# Patient Record
Sex: FEMALE | Race: WHITE | NOT HISPANIC OR LATINO | Employment: FULL TIME | URBAN - METROPOLITAN AREA
[De-identification: names, ages, dates, MRNs, and addresses within clinical notes are randomized per-mention and may not be internally consistent; named-entity substitution may affect disease eponyms.]

---

## 2023-03-29 ENCOUNTER — HOSPITAL ENCOUNTER (OUTPATIENT)
Dept: DATA CONVERSION | Facility: HOSPITAL | Age: 58
End: 2023-03-29
Attending: INTERNAL MEDICINE | Admitting: INTERNAL MEDICINE
Payer: COMMERCIAL

## 2023-03-29 DIAGNOSIS — Z86.010 PERSONAL HISTORY OF COLONIC POLYPS: ICD-10-CM

## 2023-03-29 DIAGNOSIS — F41.9 ANXIETY DISORDER, UNSPECIFIED: ICD-10-CM

## 2023-03-29 DIAGNOSIS — Z87.891 PERSONAL HISTORY OF NICOTINE DEPENDENCE: ICD-10-CM

## 2023-03-29 DIAGNOSIS — F32.A DEPRESSION, UNSPECIFIED: ICD-10-CM

## 2023-03-29 DIAGNOSIS — K63.5 POLYP OF COLON: ICD-10-CM

## 2023-03-29 DIAGNOSIS — I25.10 ATHEROSCLEROTIC HEART DISEASE OF NATIVE CORONARY ARTERY WITHOUT ANGINA PECTORIS: ICD-10-CM

## 2023-03-29 DIAGNOSIS — K21.9 GASTRO-ESOPHAGEAL REFLUX DISEASE WITHOUT ESOPHAGITIS: ICD-10-CM

## 2023-03-29 DIAGNOSIS — Z80.0 FAMILY HISTORY OF MALIGNANT NEOPLASM OF DIGESTIVE ORGANS: ICD-10-CM

## 2023-03-29 DIAGNOSIS — Z12.11 ENCOUNTER FOR SCREENING FOR MALIGNANT NEOPLASM OF COLON: ICD-10-CM

## 2023-03-29 DIAGNOSIS — I10 ESSENTIAL (PRIMARY) HYPERTENSION: ICD-10-CM

## 2023-03-29 DIAGNOSIS — K64.0 FIRST DEGREE HEMORRHOIDS: ICD-10-CM

## 2023-03-29 DIAGNOSIS — J43.9 EMPHYSEMA, UNSPECIFIED (MULTI): ICD-10-CM

## 2023-04-05 LAB
COMPLETE PATHOLOGY REPORT: NORMAL
CONVERTED CLINICAL DIAGNOSIS-HISTORY: NORMAL
CONVERTED FINAL DIAGNOSIS: NORMAL
CONVERTED FINAL REPORT PDF LINK TO COPY AND PASTE: NORMAL
CONVERTED GROSS DESCRIPTION: NORMAL

## 2023-05-24 ENCOUNTER — HOSPITAL ENCOUNTER (OUTPATIENT)
Dept: DATA CONVERSION | Facility: HOSPITAL | Age: 58
End: 2023-05-24
Attending: HOSPITALIST | Admitting: HOSPITALIST
Payer: COMMERCIAL

## 2023-05-24 DIAGNOSIS — I71.20 THORACIC AORTIC ANEURYSM, WITHOUT RUPTURE, UNSPECIFIED (CMS-HCC): ICD-10-CM

## 2023-05-24 DIAGNOSIS — Z87.891 PERSONAL HISTORY OF NICOTINE DEPENDENCE: ICD-10-CM

## 2023-05-24 LAB
ANION GAP IN SER/PLAS: 9 MMOL/L (ref 10–20)
CALCIUM (MG/DL) IN SER/PLAS: 8.8 MG/DL (ref 8.6–10.3)
CARBON DIOXIDE, TOTAL (MMOL/L) IN SER/PLAS: 31 MMOL/L (ref 21–32)
CHLORIDE (MMOL/L) IN SER/PLAS: 104 MMOL/L (ref 98–107)
CREATININE (MG/DL) IN SER/PLAS: 0.54 MG/DL (ref 0.5–1.05)
ERYTHROCYTE DISTRIBUTION WIDTH (RATIO) BY AUTOMATED COUNT: 13.2 % (ref 11.5–14.5)
ERYTHROCYTE MEAN CORPUSCULAR HEMOGLOBIN CONCENTRATION (G/DL) BY AUTOMATED: 32.1 G/DL (ref 32–36)
ERYTHROCYTE MEAN CORPUSCULAR VOLUME (FL) BY AUTOMATED COUNT: 92 FL (ref 80–100)
ERYTHROCYTES (10*6/UL) IN BLOOD BY AUTOMATED COUNT: 4.5 X10E12/L (ref 4–5.2)
GFR FEMALE: >90 ML/MIN/1.73M2
GLUCOSE (MG/DL) IN SER/PLAS: 88 MG/DL (ref 74–99)
HEMATOCRIT (%) IN BLOOD BY AUTOMATED COUNT: 41.4 % (ref 36–46)
HEMOGLOBIN (G/DL) IN BLOOD: 13.3 G/DL (ref 12–16)
LEUKOCYTES (10*3/UL) IN BLOOD BY AUTOMATED COUNT: 6.3 X10E9/L (ref 4.4–11.3)
PLATELETS (10*3/UL) IN BLOOD AUTOMATED COUNT: 247 X10E9/L (ref 150–450)
POTASSIUM (MMOL/L) IN SER/PLAS: 3.9 MMOL/L (ref 3.5–5.3)
SODIUM (MMOL/L) IN SER/PLAS: 140 MMOL/L (ref 136–145)
UREA NITROGEN (MG/DL) IN SER/PLAS: 14 MG/DL (ref 6–23)

## 2023-07-20 LAB
ALANINE AMINOTRANSFERASE (SGPT) (U/L) IN SER/PLAS: 23 U/L (ref 7–45)
ALBUMIN (G/DL) IN SER/PLAS: 3.5 G/DL (ref 3.4–5)
ALKALINE PHOSPHATASE (U/L) IN SER/PLAS: 102 U/L (ref 33–110)
ANION GAP IN SER/PLAS: 13 MMOL/L (ref 10–20)
ASPARTATE AMINOTRANSFERASE (SGOT) (U/L) IN SER/PLAS: 13 U/L (ref 9–39)
BASOPHILS (10*3/UL) IN BLOOD BY AUTOMATED COUNT: 0.05 X10E9/L (ref 0–0.1)
BASOPHILS/100 LEUKOCYTES IN BLOOD BY AUTOMATED COUNT: 0.3 % (ref 0–2)
BILIRUBIN TOTAL (MG/DL) IN SER/PLAS: 0.5 MG/DL (ref 0–1.2)
CALCIUM (MG/DL) IN SER/PLAS: 8.7 MG/DL (ref 8.6–10.3)
CARBON DIOXIDE, TOTAL (MMOL/L) IN SER/PLAS: 27 MMOL/L (ref 21–32)
CHLORIDE (MMOL/L) IN SER/PLAS: 105 MMOL/L (ref 98–107)
CREATININE (MG/DL) IN SER/PLAS: 0.44 MG/DL (ref 0.5–1.05)
EOSINOPHILS (10*3/UL) IN BLOOD BY AUTOMATED COUNT: 0.19 X10E9/L (ref 0–0.7)
EOSINOPHILS/100 LEUKOCYTES IN BLOOD BY AUTOMATED COUNT: 1.3 % (ref 0–6)
ERYTHROCYTE DISTRIBUTION WIDTH (RATIO) BY AUTOMATED COUNT: 13.7 % (ref 11.5–14.5)
ERYTHROCYTE DISTRIBUTION WIDTH (RATIO) BY AUTOMATED COUNT: 13.7 % (ref 11.5–14.5)
ERYTHROCYTE MEAN CORPUSCULAR HEMOGLOBIN CONCENTRATION (G/DL) BY AUTOMATED: 31.2 G/DL (ref 32–36)
ERYTHROCYTE MEAN CORPUSCULAR HEMOGLOBIN CONCENTRATION (G/DL) BY AUTOMATED: 31.2 G/DL (ref 32–36)
ERYTHROCYTE MEAN CORPUSCULAR VOLUME (FL) BY AUTOMATED COUNT: 92 FL (ref 80–100)
ERYTHROCYTE MEAN CORPUSCULAR VOLUME (FL) BY AUTOMATED COUNT: 92 FL (ref 80–100)
ERYTHROCYTES (10*6/UL) IN BLOOD BY AUTOMATED COUNT: 3.85 X10E12/L (ref 4–5.2)
ERYTHROCYTES (10*6/UL) IN BLOOD BY AUTOMATED COUNT: 3.85 X10E12/L (ref 4–5.2)
GFR FEMALE: >90 ML/MIN/1.73M2
GLUCOSE (MG/DL) IN SER/PLAS: 132 MG/DL (ref 74–99)
HEMATOCRIT (%) IN BLOOD BY AUTOMATED COUNT: 35.3 % (ref 36–46)
HEMATOCRIT (%) IN BLOOD BY AUTOMATED COUNT: 35.3 % (ref 36–46)
HEMOGLOBIN (G/DL) IN BLOOD: 11 G/DL (ref 12–16)
HEMOGLOBIN (G/DL) IN BLOOD: 11 G/DL (ref 12–16)
IMMATURE GRANULOCYTES/100 LEUKOCYTES IN BLOOD BY AUTOMATED COUNT: 0.7 % (ref 0–0.9)
LEUKOCYTES (10*3/UL) IN BLOOD BY AUTOMATED COUNT: 14.7 X10E9/L (ref 4.4–11.3)
LEUKOCYTES (10*3/UL) IN BLOOD BY AUTOMATED COUNT: 14.7 X10E9/L (ref 4.4–11.3)
LYMPHOCYTES (10*3/UL) IN BLOOD BY AUTOMATED COUNT: 1.28 X10E9/L (ref 1.2–4.8)
LYMPHOCYTES/100 LEUKOCYTES IN BLOOD BY AUTOMATED COUNT: 8.8 % (ref 13–44)
MAGNESIUM (MG/DL) IN SER/PLAS: 1.95 MG/DL (ref 1.6–2.4)
MONOCYTES (10*3/UL) IN BLOOD BY AUTOMATED COUNT: 1.03 X10E9/L (ref 0.1–1)
MONOCYTES/100 LEUKOCYTES IN BLOOD BY AUTOMATED COUNT: 7 % (ref 2–10)
NEUTROPHILS (10*3/UL) IN BLOOD BY AUTOMATED COUNT: 11.97 X10E9/L (ref 1.2–7.7)
NEUTROPHILS/100 LEUKOCYTES IN BLOOD BY AUTOMATED COUNT: 81.9 % (ref 40–80)
PLATELETS (10*3/UL) IN BLOOD AUTOMATED COUNT: 556 X10E9/L (ref 150–450)
PLATELETS (10*3/UL) IN BLOOD AUTOMATED COUNT: 556 X10E9/L (ref 150–450)
POTASSIUM (MMOL/L) IN SER/PLAS: 4.1 MMOL/L (ref 3.5–5.3)
PROTEIN TOTAL: 6.1 G/DL (ref 6.4–8.2)
SODIUM (MMOL/L) IN SER/PLAS: 141 MMOL/L (ref 136–145)
UREA NITROGEN (MG/DL) IN SER/PLAS: 19 MG/DL (ref 6–23)

## 2023-09-07 VITALS — WEIGHT: 170.86 LBS | HEIGHT: 65 IN | BODY MASS INDEX: 28.47 KG/M2

## 2023-09-07 VITALS — BODY MASS INDEX: 30.01 KG/M2 | WEIGHT: 180.34 LBS

## 2023-09-30 NOTE — H&P
History & Physical Reviewed:   Pregnant/Lactating:  ·  Are You Pregnant no   ·  Are You Currently Breastfeeding no     I have reviewed the History and Physical dated:  03-May-2023   History and Physical reviewed and relevant findings noted. Patient examined to review pertinent physical  findings.: No significant changes   Home Medications Reviewed: no changes noted   Allergies Reviewed: no changes noted       Airway/Sedation Assessment:  ·  Emotional Status calm   ·  Neurologic alert & oriented x 3   ·  Respiratory clear to auscultation   ·  Cardiovascular rhythm & rate regular   ·  GI/ soft, nontender     · Pulses present: Pedal Left, Pedal Right, Radial Left, Radial Right     ·  Mouth Opening OK yes   ·  Neck Flexibility OK yes   ·  Loose Teeth no   ·  Oropharyngeal Classification Class I   ·  Sedation Plan light sedation       ERAS (Enhanced Recovery After Surgery):  ·  ERAS Patient: no     Consent:   COVID-19 Consent:  ·  COVID-19 Risk Consent Surgeon has reviewed key risks related to the risk of micha COVID-19 and if they contract COVID-19 what the risks are.       Electronic Signatures:  Primo Delgado (APRN-CNP)  (Signed 24-May-2023 09:35)   Authored: History & Physical Reviewed, Airway/Sedation,  ERAS, Consent, Note Completion      Last Updated: 24-May-2023 09:35 by Primo Delgado (APRN-CNP)

## 2023-10-24 PROBLEM — E66.811 CLASS 1 OBESITY DUE TO EXCESS CALORIES WITHOUT SERIOUS COMORBIDITY WITH BODY MASS INDEX (BMI) OF 30.0 TO 30.9 IN ADULT: Status: ACTIVE | Noted: 2023-10-24

## 2023-10-24 PROBLEM — E78.5 DYSLIPIDEMIA: Status: ACTIVE | Noted: 2023-10-24

## 2023-10-24 PROBLEM — Z98.890 STATUS POST THORACIC AORTIC ANEURYSM REPAIR: Status: ACTIVE | Noted: 2023-10-24

## 2023-10-24 PROBLEM — I11.9 BENIGN HYPERTENSIVE HEART DISEASE WITHOUT CONGESTIVE HEART FAILURE: Status: ACTIVE | Noted: 2023-10-24

## 2023-10-24 PROBLEM — Z87.891 HISTORY OF TOBACCO USE: Status: ACTIVE | Noted: 2023-10-24

## 2023-10-24 PROBLEM — Z86.79 STATUS POST THORACIC AORTIC ANEURYSM REPAIR: Status: ACTIVE | Noted: 2023-10-24

## 2023-10-24 PROBLEM — E66.09 CLASS 1 OBESITY DUE TO EXCESS CALORIES WITHOUT SERIOUS COMORBIDITY WITH BODY MASS INDEX (BMI) OF 30.0 TO 30.9 IN ADULT: Status: ACTIVE | Noted: 2023-10-24

## 2023-10-30 ENCOUNTER — APPOINTMENT (OUTPATIENT)
Dept: CARDIOLOGY | Facility: CLINIC | Age: 58
End: 2023-10-30
Payer: COMMERCIAL

## 2023-11-20 PROBLEM — R05.3 CHRONIC COUGH: Status: ACTIVE | Noted: 2023-11-20

## 2023-11-20 PROBLEM — R07.9 CHEST PAIN, UNSPECIFIED: Status: ACTIVE | Noted: 2023-11-20

## 2023-11-20 NOTE — PROGRESS NOTES
[facility]    Patient Name: Cassidy Arnold  Patient : 1965      Scribe Attestation  By signing my name below, I EvaJanet Castillo   attest that this documentation has been prepared under the direction and in the presence of Marty Redd DO.       Reason for visit:  This is a 58-year-old female here for follow-up regarding thoracic ascending aortic aneurysm, complaint of chest pain, chronic cough, dyslipidemia, and tobacco use.      HPI:  This is a 58-year-old female here for follow-up regarding thoracic ascending aortic aneurysm, complaint of chest pain, chronic cough, dyslipidemia, and tobacco use.  The patient was last evaluated by me in 2023.  At that visit I had referred her to the aortic clinic, ordered a lipid panel, recommended stress testing which she declined.  Patient subsequently underwent replacement of the ascending aorta with hemiarch using a 30 cm Gelweave vascular graft in 2023.  Patient was seen by the cardiology NP several weeks later at which time lisinopril was reduced to 2.5 mg daily blood work was ordered patient was referred to cardiac rehab, and she was asked to follow-up again in 3 months.  Patient was last seen by CT surgery in 2023 where patient was reported to be doing well and she was asked to be seen again in 1 year with a new CT scan.  CMP done in 2023 showed normal serum sodium and potassium with a serum creatinine of 0.44, normal ALT/AST, serum magnesium was 1.99, BNP was 255, troponin was 3, CBC showed a hemoglobin of 11.0.  Cardiac catheterization done in May 2023 showed angiographically unremarkable coronary arteries.  Intraoperative TANIA done 2023 showed normal left ventricular systolic function, normal right ventricular systolic function, no significant valve abnormalities. ECG done today showed sinus rhythm with a heart rate of 70 bpm. The patient reports occasional dizziness when quickly  turning positions and lasts for a few seconds. She states that she notices dizziness also from when turning her head from side to side. She denies any new chest pain, shortness of breath, and palpitations. She states that she is currently taking metoprolol 25 mg twice daily. She states that she takes all of her medications as prescribed. During my exam, she was resting comfortably on the exam table.        Assessment/Plan:   1. Thoracic ascending aortic aneurysm  The patient has a history of thoracic ascending aortic aneurysm and underwent replacement of the ascending aorta with hemiarch using a 30 cm Gelweave vascular graft in July 2023.   Intraoperative TANIA done 7/7/2023 showed normal left ventricular systolic function, normal right ventricular systolic function, no significant valve abnormalities.   She should continue to follow with CT surgery.    2. Chest pain  The previously reported chest pain had improved since the last visit.  ECG done today showed sinus rhythm with a heart rate of 70 bpm.    Blood pressure appears controlled on exam today.  Cardiac catheterization done in May 2023 showed angiographically unremarkable coronary arteries.    Lipid panel done 2/4/2022 showed an LDL cholesterol of 83 and triglycerides of 58 while not on any lipid-lowering medications.  She is currently on pravastatin 20 mg daily.   Lab works as noted below will be done in 6 months prior to his next visit.  Please see lifestyle recommendations below.   Follow up in 6 months and sooner if necessary.      3. Dyslipidemia  Lipid panel done 2/4/2022 showed an LDL cholesterol of 83 and triglycerides of 58 while not on any lipid-lowering medications.  She is currently on pravastatin 20 mg daily.   I will update lipid panel in 6 months.  Please see lifestyle recommendations below.      4. Tobacco use  Patient reports that she has quit smoking.  I discussed with her the importance of continued smoking cessation.     5. Obesity  Please  see lifestyle recommendations below.     6. Dizziness  The patient reports dizziness when quickly changing positions.  Blood pressure appears controlled on  exam today.  Echocardiogram as noted above.  Recent lab works as noted in the HPI.  Lab works as noted below will be done in 6 months prior to his next visit.  Patient should follow general recommendations including staying well-hydrated, changing the positions slowly, wearing compression stockings as necessary, and routine exercise.     Orders:   CMP/lipid/magnesium/CBC in 6 months,   Follow-up in 6 months.         Lifestyle Recommendations  I recommend a whole-food plant-based diet, an eating pattern that encourages the consumption of unrefined plant foods (such as fruits, vegetables, tubers, whole grains, legumes, nuts and seeds) and discourages meats, dairy products, eggs and processed foods.     The AHA/ACC recommends that the patient consume a dietary pattern that emphasizes intake of vegetables, fruits, and whole grains; includes low-fat dairy products, poultry, fish, legumes, non-tropical vegetable oils, and nuts; and limits intake of sodium, sweets, sugar-sweetened beverages, and red meats.  Adapt this dietary pattern to appropriate calorie requirements (a 500-750 kcal/day deficit to loose weight), personal and cultural food preferences, and nutrition therapy for other medical conditions (including diabetes).  Achieve this pattern by following plans such as the [place] Mediterranean, DASH dietary pattern, or AHA diet.     Engage in 2 hours and 30 minutes per week of moderate-intensity physical activity, or 1 hour and 15 minutes (75 minutes) per week of vigorous-intensity aerobic physical activity, or an equivalent combination of moderate and vigorous-intensity aerobic physical activity. Aerobic activity should be performed in episodes of at least 10 minutes preferably spread throughout the week.     Adhering to a heart healthy diet, regular exercise  habits, avoidance of tobacco products, and maintenance of a healthy weight are crucial components of their heart disease risk reduction.     Any positive review of systems not specifically addressed in the office visit today should be evaluated and treated by the patients primary care physician or in an emergency department if necessary     Patient was notified that results from ordered tests will be called to the patient if it changes current management; it will otherwise be discussed at a future appointment and available on [facility].     Thank you for allowing me to participate in the care of this patient.        This document was generated using the assistance of voice recognition software. If there are any errors of spelling, grammar, syntax, or meaning; please feel free to contact me directly for clarification.    Past Medical History:  She has no past medical history on file.    Past Surgical History:  She has no past surgical history on file.      Social History:  She has no history on file for tobacco use, alcohol use, and drug use.    Family History:  No family history on file.     Allergies:  Patient has no allergy information on record.    Outpatient Medications:  Current Outpatient Medications   Medication Instructions    lidocaine 4 % patch APPLY 1 PATCH TO THE AFFECTED AREA AND LEAVE IN PLACE FOR 12 HOURS, THEN REMOVE AND LEAVE OFF FOR 12 HOURS.    metoprolol tartrate (Lopressor) 50 mg tablet TAKE 1 TABLET BY MOUTH TWO TIMES A DAY    traMADol (Ultram) 50 mg tablet TAKE 1 TABLET BY MOUTH EVERY 8 HOURS FOR 3 DAYS. AS NEEDED FOR SEVERE POST-OPERATIVE PAIN        ROS:  A 14 point review of systems was done and is negative other than as stated in HPI    Vitals:      6/2/2022    10:45 AM 12/6/2022     2:31 PM 3/29/2023     9:35 AM 4/19/2023     2:11 PM 5/3/2023     2:30 PM 5/24/2023     9:30 AM 7/20/2023     9:10 AM   Vitals   Systolic 123 124  120 137  110   Diastolic 77 77  86 75  70   Heart Rate 80 85   "81 83  111   Temp 36.2 °C (97.2 °F) 37.7 °C (99.8 °F)        Resp 16 16        Height (in)   1.651 m (5' 5\") 1.651 m (5' 5\") 1.651 m (5' 5\")  1.651 m (5' 5\")   Weight (lb) 173.1 177.7 170.86 173 180 180.34 183   BMI 28.81 kg/m2 29.57 kg/m2 28.43 kg/m2 28.79 kg/m2 29.95 kg/m2 30.01 kg/m2 30.45 kg/m2   BSA (m2) 1.9 m2 1.92 m2 1.89 m2 1.9 m2 1.93 m2 1.94 m2 1.95 m2        Physical Exam:   Constitutional: Cooperative, in no acute distress, alert, appears stated age.  Skin: Skin color, texture, turgor normal. No rashes or lesions.  Head: Normocephalic. No masses, lesions, tenderness or abnormalities  Eyes: Extraocular movements are grossly intact.  Mouth and throat: Mucous membranes moist  Neck: Neck supple, no carotid bruits, no JVD  Respiratory: Lungs clear to auscultation, no wheezing or rhonchi, no use of accessory muscles  Chest wall: Surgical scar, normal excursion with respiration  Cardiovascular: Regular rhythm without murmur, gallop, or rubs  Gastrointestinal: Abdomen soft, nontender. Bowel sounds normal. Obese.  Musculoskeletal: Strength equal in upper extremities  Extremities: No pitting edema  Neurologic: Sensation grossly intact, alert and oriented x3    Intake/Output:   No intake/output data recorded.    Outpatient Medications  Current Outpatient Medications on File Prior to Visit   Medication Sig Dispense Refill    lidocaine 4 % patch APPLY 1 PATCH TO THE AFFECTED AREA AND LEAVE IN PLACE FOR 12 HOURS, THEN REMOVE AND LEAVE OFF FOR 12 HOURS. 7 patch 0    metoprolol tartrate (Lopressor) 50 mg tablet TAKE 1 TABLET BY MOUTH TWO TIMES A DAY 60 tablet 0    traMADol (Ultram) 50 mg tablet TAKE 1 TABLET BY MOUTH EVERY 8 HOURS FOR 3 DAYS. AS NEEDED FOR SEVERE POST-OPERATIVE PAIN 9 tablet 0     No current facility-administered medications on file prior to visit.       Labs: (past 26 weeks)  Recent Results (from the past 4368 hour(s))   Basic Metabolic Panel    Collection Time: 05/24/23  9:10 AM   Result Value Ref " Range    Glucose 88 74 - 99 mg/dL    Sodium 140 136 - 145 mmol/L    Potassium 3.9 3.5 - 5.3 mmol/L    Chloride 104 98 - 107 mmol/L    Bicarbonate 31 21 - 32 mmol/L    Anion Gap 9 (L) 10 - 20 mmol/L    Urea Nitrogen 14 6 - 23 mg/dL    Creatinine 0.54 0.50 - 1.05 mg/dL    GFR Female >90 >90 mL/min/1.73m2    Calcium 8.8 8.6 - 10.3 mg/dL   CBC    Collection Time: 05/24/23  9:10 AM   Result Value Ref Range    WBC 6.3 4.4 - 11.3 x10E9/L    RBC 4.50 4.00 - 5.20 x10E12/L    Hemoglobin 13.3 12.0 - 16.0 g/dL    Hematocrit 41.4 36.0 - 46.0 %    MCV 92 80 - 100 fL    MCHC 32.1 32.0 - 36.0 g/dL    Platelets 247 150 - 450 x10E9/L    RDW 13.2 11.5 - 14.5 %   Staphylococcus/MRSA Colonization, Culture    Collection Time: 07/03/23  2:40 PM    Specimen: Respiratory    Nasal   Result Value Ref Range    Staph/MRSA Screen Culture Staphylococcus aureus (A)    Urinalysis with Reflex Microscopic    Collection Time: 07/03/23  2:40 PM   Result Value Ref Range    Color, Urine YELLOW STRAW,YELLOW    Appearance, Urine CLEAR CLEAR    Specific Gravity, Urine 1.025 1.005 - 1.035    pH, Urine 6.0 5.0 - 8.0    Protein, Urine NEGATIVE NEGATIVE mg/dL    Glucose, Urine NEGATIVE NEGATIVE mg/dL    Blood, Urine MODERATE (2+) (A) NEGATIVE    Ketones, Urine NEGATIVE NEGATIVE mg/dL    Bilirubin, Urine NEGATIVE NEGATIVE    Urobilinogen, Urine <2.0 0.0 - 1.9 mg/dL    Nitrite, Urine NEGATIVE NEGATIVE    Leukocyte Esterase, Urine NEGATIVE NEGATIVE   Urine Culture    Collection Time: 07/03/23  2:40 PM    Specimen: Urine   Result Value Ref Range    Urine Culture NO SIGNIFICANT GROWTH.    Type And Screen    Collection Time: 07/03/23  2:40 PM   Result Value Ref Range    ABO GROUP (TYPE) IN BLOOD O     Rh POS     ANTIBODY SCREEN NEG    Coagulation Screen    Collection Time: 07/03/23  2:40 PM   Result Value Ref Range    Protime 10.4 9.8 - 12.8 sec    INR 0.9 0.9 - 1.1    aPTT 29 27 - 38 sec   Basic Metabolic Panel    Collection Time: 07/03/23  2:40 PM   Result Value  Ref Range    Glucose 76 74 - 99 mg/dL    Sodium 142 136 - 145 mmol/L    Potassium 4.0 3.5 - 5.3 mmol/L    Chloride 105 98 - 107 mmol/L    Bicarbonate 27 21 - 32 mmol/L    Anion Gap 14 10 - 20 mmol/L    Urea Nitrogen 17 6 - 23 mg/dL    Creatinine 0.53 0.50 - 1.05 mg/dL    GFR Female >90 >90 mL/min/1.73m2    Calcium 9.0 8.6 - 10.6 mg/dL   CBC    Collection Time: 07/03/23  2:40 PM   Result Value Ref Range    WBC 7.4 4.4 - 11.3 x10E9/L    nRBC 0.0 0.0 - 0.0 /100 WBC    RBC 4.59 4.00 - 5.20 x10E12/L    Hemoglobin 13.5 12.0 - 16.0 g/dL    Hematocrit 42.2 36.0 - 46.0 %    MCV 92 80 - 100 fL    MCHC 32.0 32.0 - 36.0 g/dL    Platelets 259 150 - 450 x10E9/L    RDW 13.2 11.5 - 14.5 %   Urinalysis Microscopic Only    Collection Time: 07/03/23  2:40 PM   Result Value Ref Range    WBC, Urine 2 0 - 5 /HPF    RBC, Urine 44 (A) 0 - 5 /HPF    Squamous Epithelial Cells, Urine <1 /HPF   Abo/Rh Group Test    Collection Time: 07/07/23  7:00 AM   Result Value Ref Range    ABO GROUP (TYPE) IN BLOOD O     Rh POS    Blood Gas Arterial Full Panel    Collection Time: 07/07/23  8:09 AM   Result Value Ref Range    pH, Arterial 7.44 (H) 7.38 - 7.42    pCO2, Arterial 40 38 - 42 mmHg    pO2, Arterial 77 (L) 85 - 95 mmHg    Patient Temperature 37.0 degrees C    FiO2 100 %    SO2, Arterial 97 94 - 100 %    OXY Hemoglobin, Arterial 95.0 94.0 - 98.0 %    Base Excess, Arterial 2.8 -2.0 - 3.0 mmol/L    HCO3, Arterial 27.2 (H) 22.0 - 26.0 mmol/L    Hematocrit, Arterial 38.0 36.0 - 46.0 %    Sodium, Arterial 136 136 - 145 mmol/L    Potassium, Arterial 3.9 3.5 - 5.3 mmol/L    Chloride, Arterial 106 98 - 107 mmol/L    Ionized Calcium, Arterial 1.09 (L) 1.10 - 1.33 mmol/L    Glucose, Arterial 97 74 - 99 mg/dL    Lactate, Arterial 1.1 0.4 - 2.0 mmol/L    Hemoglobin, Arterial 12.6 12.0 - 16.0 g/dL    Anion Gap, Arterial 7 (L) 10 - 25 mmol/L   Coox Panel, Arterial    Collection Time: 07/07/23  8:09 AM   Result Value Ref Range    Hemoglobin, Arterial 12.6 12.0 -  16.0 g/dL    OXY Hemoglobin, Arterial 95.0 94.0 - 98.0 %    POCT Carboxyhemoglobin, Arterial 1.8 (A) %    Methemoglobin, Arterial 0.2 0.0 - 1.5 %    POCT Deoxy Hemoglobin, Arterial 2.9 0.0 - 5.0 %   ACTIVATED CLOTTING TIME HIGH Data Conversion    Collection Time: 07/07/23  8:19 AM   Result Value Ref Range    Activated Clotting Time POC High Range 93 (L) 96 - 152 SECONDS   Coox Panel, Arterial    Collection Time: 07/07/23  9:31 AM   Result Value Ref Range    Hemoglobin, Arterial 11.4 (L) 12.0 - 16.0 g/dL    OXY Hemoglobin, Arterial 97.9 94.0 - 98.0 %    POCT Carboxyhemoglobin, Arterial 1.5 %    Methemoglobin, Arterial 0.3 0.0 - 1.5 %    POCT Deoxy Hemoglobin, Arterial 0.3 0.0 - 5.0 %   Blood Gas Arterial Full Panel    Collection Time: 07/07/23  9:31 AM   Result Value Ref Range    pH, Arterial 7.33 (L) 7.38 - 7.42    pCO2, Arterial 51 (H) 38 - 42 mmHg    pO2, Arterial 410 (H) 85 - 95 mmHg    Patient Temperature 37.0 degrees C    FiO2 100 %    SO2, Arterial 100 94 - 100 %    OXY Hemoglobin, Arterial 97.9 94.0 - 98.0 %    Base Excess, Arterial 0.4 -2.0 - 3.0 mmol/L    HCO3, Arterial 26.9 (H) 22.0 - 26.0 mmol/L    Hematocrit, Arterial 34.0 (L) 36.0 - 46.0 %    Sodium, Arterial 135 (L) 136 - 145 mmol/L    Potassium, Arterial 3.8 3.5 - 5.3 mmol/L    Chloride, Arterial 104 98 - 107 mmol/L    Ionized Calcium, Arterial 1.11 1.10 - 1.33 mmol/L    Glucose, Arterial 138 (H) 74 - 99 mg/dL    Lactate, Arterial 1.0 0.4 - 2.0 mmol/L    Hemoglobin, Arterial 11.4 (L) 12.0 - 16.0 g/dL    Anion Gap, Arterial 8 (L) 10 - 25 mmol/L   ACTIVATED CLOTTING TIME HIGH Data Conversion    Collection Time: 07/07/23  9:39 AM   Result Value Ref Range    Activated Clotting Time POC High Range 631 (H) 96 - 152 SECONDS   Blood Gas Arterial Full Panel    Collection Time: 07/07/23 10:04 AM   Result Value Ref Range    pH, Arterial 7.48 (H) 7.38 - 7.42    pCO2, Arterial 35 (L) 38 - 42 mmHg    pO2, Arterial 476 (H) 85 - 95 mmHg    Patient Temperature 37.0  degrees C    SO2, Arterial 99 94 - 100 %    OXY Hemoglobin, Arterial 97.9 94.0 - 98.0 %    Base Excess, Arterial 2.5 -2.0 - 3.0 mmol/L    HCO3, Arterial 26.1 (H) 22.0 - 26.0 mmol/L    Hematocrit, Arterial 24.0 (L) 36.0 - 46.0 %    Sodium, Arterial 135 (L) 136 - 145 mmol/L    Potassium, Arterial 4.6 3.5 - 5.3 mmol/L    Chloride, Arterial 103 98 - 107 mmol/L    Ionized Calcium, Arterial 0.94 (L) 1.10 - 1.33 mmol/L    Glucose, Arterial 144 (H) 74 - 99 mg/dL    Lactate, Arterial 1.5 0.4 - 2.0 mmol/L    Hemoglobin, Arterial 7.9 (L) 12.0 - 16.0 g/dL    Anion Gap, Arterial 11 10 - 25 mmol/L   Coox Panel, Arterial    Collection Time: 07/07/23 10:04 AM   Result Value Ref Range    Hemoglobin, Arterial 7.9 (L) 12.0 - 16.0 g/dL    OXY Hemoglobin, Arterial 97.9 94.0 - 98.0 %    POCT Carboxyhemoglobin, Arterial 1.3 %    Methemoglobin, Arterial 0.1 0.0 - 1.5 %    POCT Deoxy Hemoglobin, Arterial 0.7 0.0 - 5.0 %   ACTIVATED CLOTTING TIME HIGH Data Conversion    Collection Time: 07/07/23 10:13 AM   Result Value Ref Range    Activated Clotting Time POC High Range 606 (H) 96 - 152 SECONDS   Blood Gas Arterial Full Panel    Collection Time: 07/07/23 10:29 AM   Result Value Ref Range    pH, Arterial 7.40 7.38 - 7.42    pCO2, Arterial 43 (H) 38 - 42 mmHg    pO2, Arterial 452 (H) 85 - 95 mmHg    Patient Temperature 37.0 degrees C    FiO2 100 %    SO2, Arterial 100 94 - 100 %    OXY Hemoglobin, Arterial 98.2 (H) 94.0 - 98.0 %    Base Excess, Arterial 1.6 -2.0 - 3.0 mmol/L    HCO3, Arterial 26.6 (H) 22.0 - 26.0 mmol/L    Hematocrit, Arterial 25.0 (L) 36.0 - 46.0 %    Sodium, Arterial 135 (L) 136 - 145 mmol/L    Potassium, Arterial 4.1 3.5 - 5.3 mmol/L    Chloride, Arterial 104 98 - 107 mmol/L    Ionized Calcium, Arterial 1.04 (L) 1.10 - 1.33 mmol/L    Glucose, Arterial 146 (H) 74 - 99 mg/dL    Lactate, Arterial 1.6 0.4 - 2.0 mmol/L    Hemoglobin, Arterial 8.4 (L) 12.0 - 16.0 g/dL    Anion Gap, Arterial 9 (L) 10 - 25 mmol/L   Coox Panel,  Arterial    Collection Time: 07/07/23 10:29 AM   Result Value Ref Range    Hemoglobin, Arterial 8.4 (L) 12.0 - 16.0 g/dL    OXY Hemoglobin, Arterial 98.2 (H) 94.0 - 98.0 %    POCT Carboxyhemoglobin, Arterial 1.3 %    Methemoglobin, Arterial 0.0 0.0 - 1.5 %    POCT Deoxy Hemoglobin, Arterial 0.5 0.0 - 5.0 %   SURGICAL PATHOLOGY RESULTS    Collection Time: 07/07/23 10:32 AM   Result Value Ref Range    Pathology Report       Name BLANCHE MADRIGAL                                                                                                   Accession #: [id number]            Pathologist:                   STANTON SPAULDING MD  Date of Procedure:    7/7/2023  Date Received:          7/7/2023  Date Reported           8/2/2023  Submitting Physician:   EDWARD BAILEY MD  Location:                    [facility]  Other External #                                                                    FINAL DIAGNOSIS  A.  AORTA:    --FRAGMENTS OF AORTA WITH DEGENERATIVE CHANGES.                                                                                                                                                                                                                                                                                                                                                                                                                                                                                      Electronically Signed Out By STANTON SPAULDING MD/MRG1  By the signature on this report, the individual or group listed as making the  Final Interpretation/Diagnosis certifies that they have reviewed this case.  Diagnostic interpretation performed at [facility] [address] [place] [address]           Clinical History:  Physician Contact Number: [phone]  Fixative (A): Saline  Clinical Diagnosis History Blanche Madrigal is a 59 y/o with with a known 5 cm  ascending aortic aneurysm who  "presents to [facility] for surgical repair.  PMH/PSH: Per HPI  Meds In Chart, reviewed  Allergies In Chart, reviewed  SH Denies etoh, smoking, recreational drugs  FH Non-contributory  12 Point review of systems was performed all pertinent positives are in HPI and  all others are negative    Specimens Submitted As:  A: AORTA     Gross Description:  Received in formalin, labeled with the patient's name and hospital number and  \"Aorta\", are multiple fragments of aorta aggregating t o 7.5 x 5.3 x 2.2 cm The  wall measures 0.5 cm in thickness. A dissection plane is not identified. Medial  hemorrhage is not noted. The intimal surface demonstrates slight calcific  atherosclerosis. The adventitial surface tan-red, remarkable for vascular  adhesions.  Representative sections are submitted in one cassette.  SELIN      mjshan/7/27/2023               [facility]  Department of Pathology   [address] [place] [address]       ACTIVATED CLOTTING TIME HIGH Data Conversion    Collection Time: 07/07/23 10:38 AM   Result Value Ref Range    Activated Clotting Time POC High Range 112 96 - 152 SECONDS   Blood Gas Arterial Full Panel    Collection Time: 07/07/23 12:17 PM   Result Value Ref Range    pH, Arterial 7.38 7.38 - 7.42    pCO2, Arterial 46 (H) 38 - 42 mmHg    pO2, Arterial 118 (H) 85 - 95 mmHg    Patient Temperature 37.0 degrees C    SO2, Arterial 99 94 - 100 %    OXY Hemoglobin, Arterial 96.6 94.0 - 98.0 %    Base Excess, Arterial 1.7 -2.0 - 3.0 mmol/L    HCO3, Arterial 27.2 (H) 22.0 - 26.0 mmol/L    Hematocrit, Arterial 32.0 (L) 36.0 - 46.0 %    Sodium, Arterial 137 136 - 145 mmol/L    Potassium, Arterial 3.8 3.5 - 5.3 mmol/L    Chloride, Arterial 108 (H) 98 - 107 mmol/L    Ionized Calcium, Arterial 1.09 (L) 1.10 - 1.33 mmol/L    Glucose, Arterial 123 (H) 74 - 99 mg/dL    Lactate, Arterial 1.2 0.4 - 2.0 mmol/L    Hemoglobin, Arterial 10.6 (L) 12.0 - 16.0 g/dL    Anion Gap, Arterial 6 (L) 10 - " 25 mmol/L   Renal Function Panel    Collection Time: 07/07/23 12:24 PM   Result Value Ref Range    Glucose 120 (H) 74 - 99 mg/dL    Sodium 141 136 - 145 mmol/L    Potassium 3.8 3.5 - 5.3 mmol/L    Chloride 106 98 - 107 mmol/L    Bicarbonate 28 21 - 32 mmol/L    Anion Gap 11 10 - 20 mmol/L    Urea Nitrogen 9 6 - 23 mg/dL    Creatinine 0.46 (L) 0.50 - 1.05 mg/dL    GFR Female >90 >90 mL/min/1.73m2    Calcium 7.9 (L) 8.6 - 10.6 mg/dL    Phosphorus 3.1 2.5 - 4.9 mg/dL    Albumin 3.2 (L) 3.4 - 5.0 g/dL   CBC    Collection Time: 07/07/23 12:24 PM   Result Value Ref Range    WBC 13.9 (H) 4.4 - 11.3 x10E9/L    nRBC 0.0 0.0 - 0.0 /100 WBC    RBC 3.44 (L) 4.00 - 5.20 x10E12/L    Hemoglobin 10.2 (L) 12.0 - 16.0 g/dL    Hematocrit 31.3 (L) 36.0 - 46.0 %    MCV 91 80 - 100 fL    MCHC 32.6 32.0 - 36.0 g/dL    Platelets 156 150 - 450 x10E9/L    RDW 13.1 11.5 - 14.5 %   Coagulation Screen    Collection Time: 07/07/23 12:24 PM   Result Value Ref Range    Protime 12.7 9.8 - 12.8 sec    INR 1.1 0.9 - 1.1    aPTT 26 (L) 27 - 38 sec   Magnesium    Collection Time: 07/07/23 12:24 PM   Result Value Ref Range    Magnesium 2.40 1.60 - 2.40 mg/dL   POCT GLUCOSE    Collection Time: 07/07/23  3:07 PM   Result Value Ref Range    POCT Glucose 104 (H) 74 - 99 mg/dL   Blood Gas Arterial Full Panel    Collection Time: 07/07/23  3:53 PM   Result Value Ref Range    pH, Arterial 7.35 (L) 7.38 - 7.42    pCO2, Arterial 44 (H) 38 - 42 mmHg    pO2, Arterial 83 (L) 85 - 95 mmHg    Patient Temperature 37.0 degrees C    FiO2 30 %    SO2, Arterial 98 94 - 100 %    OXY Hemoglobin, Arterial 95.3 94.0 - 98.0 %    Base Excess, Arterial -1.4 -2.0 - 3.0 mmol/L    HCO3, Arterial 24.3 22.0 - 26.0 mmol/L    Spontaneous Tidal Volume 350 mL    Pressure Support 5 cm H2O    Cpap 5.0 cm H2O    Hematocrit, Arterial 29.0 (L) 36.0 - 46.0 %    Sodium, Arterial 138 136 - 145 mmol/L    Potassium, Arterial 3.8 3.5 - 5.3 mmol/L    Chloride, Arterial 111 (H) 98 - 107 mmol/L     Ionized Calcium, Arterial 1.03 (L) 1.10 - 1.33 mmol/L    Glucose, Arterial 157 (H) 74 - 99 mg/dL    Lactate, Arterial 0.8 0.4 - 2.0 mmol/L    Hemoglobin, Arterial 9.7 (L) 12.0 - 16.0 g/dL    Anion Gap, Arterial 7 (L) 10 - 25 mmol/L   Blood Gas Arterial Full Panel    Collection Time: 07/07/23  4:41 PM   Result Value Ref Range    pH, Arterial 7.31 (L) 7.38 - 7.42    pCO2, Arterial 44 (H) 38 - 42 mmHg    pO2, Arterial 121 (H) 85 - 95 mmHg    Patient Temperature 37.0 degrees C    SO2, Arterial 99 94 - 100 %    OXY Hemoglobin, Arterial 96.8 94.0 - 98.0 %    Base Excess, Arterial -3.9 (L) -2.0 - 3.0 mmol/L    HCO3, Arterial 22.2 22.0 - 26.0 mmol/L    Hematocrit, Arterial 30.0 (L) 36.0 - 46.0 %    Sodium, Arterial 138 136 - 145 mmol/L    Potassium, Arterial 3.5 3.5 - 5.3 mmol/L    Chloride, Arterial 112 (H) 98 - 107 mmol/L    Ionized Calcium, Arterial 1.01 (L) 1.10 - 1.33 mmol/L    Glucose, Arterial 148 (H) 74 - 99 mg/dL    Lactate, Arterial 0.7 0.4 - 2.0 mmol/L    Hemoglobin, Arterial 10.1 (L) 12.0 - 16.0 g/dL    Anion Gap, Arterial 7 (L) 10 - 25 mmol/L   Blood Gas Arterial Full Panel    Collection Time: 07/07/23  5:51 PM   Result Value Ref Range    pH, Arterial 7.30 (L) 7.38 - 7.42    pCO2, Arterial 42 38 - 42 mmHg    pO2, Arterial 110 (H) 85 - 95 mmHg    Patient Temperature 37.0 degrees C    SO2, Arterial 98 94 - 100 %    OXY Hemoglobin, Arterial 96.8 94.0 - 98.0 %    Base Excess, Arterial -5.4 (L) -2.0 - 3.0 mmol/L    HCO3, Arterial 20.7 (L) 22.0 - 26.0 mmol/L    Hematocrit, Arterial 29.0 (L) 36.0 - 46.0 %    Sodium, Arterial 139 136 - 145 mmol/L    Potassium, Arterial 3.1 (L) 3.5 - 5.3 mmol/L    Chloride, Arterial 113 (H) 98 - 107 mmol/L    Ionized Calcium, Arterial 0.96 (L) 1.10 - 1.33 mmol/L    Glucose, Arterial 131 (H) 74 - 99 mg/dL    Lactate, Arterial 0.8 0.4 - 2.0 mmol/L    Hemoglobin, Arterial 9.5 (L) 12.0 - 16.0 g/dL    Anion Gap, Arterial 8 (L) 10 - 25 mmol/L   Coagulation Screen    Collection Time: 07/07/23   6:06 PM   Result Value Ref Range    Protime 11.9 9.8 - 12.8 sec    INR 1.1 0.9 - 1.1    aPTT 26 (L) 27 - 38 sec   Fibrinogen    Collection Time: 07/07/23  6:06 PM   Result Value Ref Range    Fibrinogen 232 200 - 400 mg/dL   POCT GLUCOSE    Collection Time: 07/07/23  9:06 PM   Result Value Ref Range    POCT Glucose 165 (H) 74 - 99 mg/dL   Magnesium    Collection Time: 07/08/23 12:13 AM   Result Value Ref Range    Magnesium 2.11 1.60 - 2.40 mg/dL   CBC    Collection Time: 07/08/23 12:13 AM   Result Value Ref Range    WBC 14.1 (H) 4.4 - 11.3 x10E9/L    nRBC 0.0 0.0 - 0.0 /100 WBC    RBC 3.41 (L) 4.00 - 5.20 x10E12/L    Hemoglobin 9.9 (L) 12.0 - 16.0 g/dL    Hematocrit 31.5 (L) 36.0 - 46.0 %    MCV 92 80 - 100 fL    MCHC 31.4 (L) 32.0 - 36.0 g/dL    Platelets 168 150 - 450 x10E9/L    RDW 13.2 11.5 - 14.5 %   Renal Function Panel    Collection Time: 07/08/23 12:13 AM   Result Value Ref Range    Glucose 142 (H) 74 - 99 mg/dL    Sodium 139 136 - 145 mmol/L    Potassium 4.0 3.5 - 5.3 mmol/L    Chloride 104 98 - 107 mmol/L    Bicarbonate 28 21 - 32 mmol/L    Anion Gap 11 10 - 20 mmol/L    Urea Nitrogen 12 6 - 23 mg/dL    Creatinine 0.51 0.50 - 1.05 mg/dL    GFR Female >90 >90 mL/min/1.73m2    Calcium 8.0 (L) 8.6 - 10.6 mg/dL    Phosphorus 3.9 2.5 - 4.9 mg/dL    Albumin 3.7 3.4 - 5.0 g/dL   Calcium, Ionized    Collection Time: 07/08/23 12:13 AM   Result Value Ref Range    Calcium, Ion 1.11 1.10 - 1.33 mmol/L   POCT GLUCOSE    Collection Time: 07/08/23 12:24 AM   Result Value Ref Range    POCT Glucose 138 (H) 74 - 99 mg/dL   Blood Gas Arterial Full Panel    Collection Time: 07/08/23 12:33 AM   Result Value Ref Range    pH, Arterial 7.33 (L) 7.38 - 7.42    pCO2, Arterial 51 (H) 38 - 42 mmHg    pO2, Arterial 66 (L) 85 - 95 mmHg    Patient Temperature 37.0 degrees C    SO2, Arterial 94 94 - 100 %    OXY Hemoglobin, Arterial 92.2 (L) 94.0 - 98.0 %    Base Excess, Arterial 0.4 -2.0 - 3.0 mmol/L    HCO3, Arterial 26.9 (H) 22.0 -  26.0 mmol/L    Hematocrit, Arterial 31.0 (L) 36.0 - 46.0 %    Sodium, Arterial 135 (L) 136 - 145 mmol/L    Potassium, Arterial 4.1 3.5 - 5.3 mmol/L    Chloride, Arterial 104 98 - 107 mmol/L    Ionized Calcium, Arterial 1.11 1.10 - 1.33 mmol/L    Glucose, Arterial 141 (H) 74 - 99 mg/dL    Lactate, Arterial 2.0 0.4 - 2.0 mmol/L    Hemoglobin, Arterial 10.4 (L) 12.0 - 16.0 g/dL    Anion Gap, Arterial 8 (L) 10 - 25 mmol/L   POCT GLUCOSE    Collection Time: 07/08/23 10:40 AM   Result Value Ref Range    POCT Glucose 103 (H) 74 - 99 mg/dL   Renal Function Panel    Collection Time: 07/08/23 12:21 PM   Result Value Ref Range    Glucose 115 (H) 74 - 99 mg/dL    Sodium 138 136 - 145 mmol/L    Potassium 3.9 3.5 - 5.3 mmol/L    Chloride 102 98 - 107 mmol/L    Bicarbonate 29 21 - 32 mmol/L    Anion Gap 11 10 - 20 mmol/L    Urea Nitrogen 19 6 - 23 mg/dL    Creatinine 0.61 0.50 - 1.05 mg/dL    GFR Female >90 >90 mL/min/1.73m2    Calcium 8.3 (L) 8.6 - 10.6 mg/dL    Phosphorus 3.6 2.5 - 4.9 mg/dL    Albumin 3.8 3.4 - 5.0 g/dL   Magnesium    Collection Time: 07/08/23 12:21 PM   Result Value Ref Range    Magnesium 2.09 1.60 - 2.40 mg/dL   CBC    Collection Time: 07/08/23 12:21 PM   Result Value Ref Range    WBC 15.9 (H) 4.4 - 11.3 x10E9/L    nRBC 0.0 0.0 - 0.0 /100 WBC    RBC 3.30 (L) 4.00 - 5.20 x10E12/L    Hemoglobin 9.8 (L) 12.0 - 16.0 g/dL    Hematocrit 30.9 (L) 36.0 - 46.0 %    MCV 94 80 - 100 fL    MCHC 31.7 (L) 32.0 - 36.0 g/dL    Platelets 151 150 - 450 x10E9/L    RDW 13.4 11.5 - 14.5 %   Calcium, Ionized    Collection Time: 07/08/23 12:21 PM   Result Value Ref Range    Calcium, Ion 1.13 1.10 - 1.33 mmol/L   POCT GLUCOSE    Collection Time: 07/08/23  3:23 PM   Result Value Ref Range    POCT Glucose 112 (H) 74 - 99 mg/dL   POCT GLUCOSE    Collection Time: 07/08/23  8:29 PM   Result Value Ref Range    POCT Glucose 123 (H) 74 - 99 mg/dL   Renal Function Panel    Collection Time: 07/09/23  7:46 AM   Result Value Ref Range     Glucose 110 (H) 74 - 99 mg/dL    Sodium 139 136 - 145 mmol/L    Potassium 3.7 3.5 - 5.3 mmol/L    Chloride 101 98 - 107 mmol/L    Bicarbonate 30 21 - 32 mmol/L    Anion Gap 12 10 - 20 mmol/L    Urea Nitrogen 18 6 - 23 mg/dL    Creatinine 0.39 (L) 0.50 - 1.05 mg/dL    GFR Female >90 >90 mL/min/1.73m2    Calcium 8.7 8.6 - 10.6 mg/dL    Phosphorus 2.3 (L) 2.5 - 4.9 mg/dL    Albumin 3.7 3.4 - 5.0 g/dL   Magnesium    Collection Time: 07/09/23  7:46 AM   Result Value Ref Range    Magnesium 2.21 1.60 - 2.40 mg/dL   CBC    Collection Time: 07/09/23  7:46 AM   Result Value Ref Range    WBC 15.2 (H) 4.4 - 11.3 x10E9/L    nRBC 0.0 0.0 - 0.0 /100 WBC    RBC 3.37 (L) 4.00 - 5.20 x10E12/L    Hemoglobin 9.9 (L) 12.0 - 16.0 g/dL    Hematocrit 31.6 (L) 36.0 - 46.0 %    MCV 94 80 - 100 fL    MCHC 31.3 (L) 32.0 - 36.0 g/dL    Platelets 152 150 - 450 x10E9/L    RDW 13.4 11.5 - 14.5 %   POCT GLUCOSE    Collection Time: 07/09/23 12:26 PM   Result Value Ref Range    POCT Glucose 104 (H) 74 - 99 mg/dL   Renal Function Panel    Collection Time: 07/10/23  6:53 AM   Result Value Ref Range    Glucose 98 74 - 99 mg/dL    Sodium 140 136 - 145 mmol/L    Potassium 3.7 3.5 - 5.3 mmol/L    Chloride 104 98 - 107 mmol/L    Bicarbonate 29 21 - 32 mmol/L    Anion Gap 11 10 - 20 mmol/L    Urea Nitrogen 12 6 - 23 mg/dL    Creatinine 0.29 (L) 0.50 - 1.05 mg/dL    GFR Female >90 >90 mL/min/1.73m2    Calcium 8.8 8.6 - 10.6 mg/dL    Phosphorus 1.8 (L) 2.5 - 4.9 mg/dL    Albumin 3.5 3.4 - 5.0 g/dL   CBC    Collection Time: 07/10/23  6:53 AM   Result Value Ref Range    WBC 12.0 (H) 4.4 - 11.3 x10E9/L    nRBC 0.0 0.0 - 0.0 /100 WBC    RBC 3.18 (L) 4.00 - 5.20 x10E12/L    Hemoglobin 9.5 (L) 12.0 - 16.0 g/dL    Hematocrit 30.2 (L) 36.0 - 46.0 %    MCV 95 80 - 100 fL    MCHC 31.5 (L) 32.0 - 36.0 g/dL    Platelets 180 150 - 450 x10E9/L    RDW 13.1 11.5 - 14.5 %   Magnesium    Collection Time: 07/10/23  6:53 AM   Result Value Ref Range    Magnesium 2.09 1.60 - 2.40  mg/dL   Magnesium    Collection Time: 07/11/23  5:54 AM   Result Value Ref Range    Magnesium 1.93 1.60 - 2.40 mg/dL   CBC    Collection Time: 07/11/23  5:54 AM   Result Value Ref Range    WBC 10.4 4.4 - 11.3 x10E9/L    nRBC 0.0 0.0 - 0.0 /100 WBC    RBC 3.42 (L) 4.00 - 5.20 x10E12/L    Hemoglobin 10.0 (L) 12.0 - 16.0 g/dL    Hematocrit 31.7 (L) 36.0 - 46.0 %    MCV 93 80 - 100 fL    MCHC 31.5 (L) 32.0 - 36.0 g/dL    Platelets 259 150 - 450 x10E9/L    RDW 13.2 11.5 - 14.5 %   Renal Function Panel    Collection Time: 07/11/23  5:54 AM   Result Value Ref Range    Glucose 102 (H) 74 - 99 mg/dL    Sodium 144 136 - 145 mmol/L    Potassium 3.5 3.5 - 5.3 mmol/L    Chloride 104 98 - 107 mmol/L    Bicarbonate 30 21 - 32 mmol/L    Anion Gap 14 10 - 20 mmol/L    Urea Nitrogen 9 6 - 23 mg/dL    Creatinine 0.33 (L) 0.50 - 1.05 mg/dL    GFR Female >90 >90 mL/min/1.73m2    Calcium 8.4 (L) 8.6 - 10.6 mg/dL    Phosphorus 2.7 2.5 - 4.9 mg/dL    Albumin 3.5 3.4 - 5.0 g/dL   CBC    Collection Time: 07/12/23  6:20 AM   Result Value Ref Range    WBC 10.6 4.4 - 11.3 x10E9/L    nRBC 0.5 0.0 - 0.0 /100 WBC    RBC 3.63 (L) 4.00 - 5.20 x10E12/L    Hemoglobin 10.6 (L) 12.0 - 16.0 g/dL    Hematocrit 33.6 (L) 36.0 - 46.0 %    MCV 93 80 - 100 fL    MCHC 31.5 (L) 32.0 - 36.0 g/dL    Platelets 309 150 - 450 x10E9/L    RDW 13.3 11.5 - 14.5 %   Renal Function Panel    Collection Time: 07/12/23  6:20 AM   Result Value Ref Range    Glucose 103 (H) 74 - 99 mg/dL    Sodium 142 136 - 145 mmol/L    Potassium 4.0 3.5 - 5.3 mmol/L    Chloride 104 98 - 107 mmol/L    Bicarbonate 28 21 - 32 mmol/L    Anion Gap 14 10 - 20 mmol/L    Urea Nitrogen 12 6 - 23 mg/dL    Creatinine 0.35 (L) 0.50 - 1.05 mg/dL    GFR Female >90 >90 mL/min/1.73m2    Calcium 8.5 (L) 8.6 - 10.6 mg/dL    Phosphorus 3.2 2.5 - 4.9 mg/dL    Albumin 3.5 3.4 - 5.0 g/dL   Magnesium    Collection Time: 07/12/23  6:20 AM   Result Value Ref Range    Magnesium 1.94 1.60 - 2.40 mg/dL   CBC     Collection Time: 07/13/23  6:40 AM   Result Value Ref Range    WBC 15.0 (H) 4.4 - 11.3 x10E9/L    nRBC 0.4 0.0 - 0.0 /100 WBC    RBC 3.83 (L) 4.00 - 5.20 x10E12/L    Hemoglobin 11.0 (L) 12.0 - 16.0 g/dL    Hematocrit 34.4 (L) 36.0 - 46.0 %    MCV 90 80 - 100 fL    MCHC 32.0 32.0 - 36.0 g/dL    Platelets 356 150 - 450 x10E9/L    RDW 13.3 11.5 - 14.5 %   Magnesium    Collection Time: 07/13/23  6:40 AM   Result Value Ref Range    Magnesium 1.77 1.60 - 2.40 mg/dL   Renal Function Panel    Collection Time: 07/13/23  6:40 AM   Result Value Ref Range    Glucose 105 (H) 74 - 99 mg/dL    Sodium 137 136 - 145 mmol/L    Potassium 3.7 3.5 - 5.3 mmol/L    Chloride 97 (L) 98 - 107 mmol/L    Bicarbonate 29 21 - 32 mmol/L    Anion Gap 15 10 - 20 mmol/L    Urea Nitrogen 11 6 - 23 mg/dL    Creatinine 0.40 (L) 0.50 - 1.05 mg/dL    GFR Female >90 >90 mL/min/1.73m2    Calcium 8.8 8.6 - 10.6 mg/dL    Phosphorus 4.4 2.5 - 4.9 mg/dL    Albumin 3.7 3.4 - 5.0 g/dL   Magnesium    Collection Time: 07/14/23  6:39 AM   Result Value Ref Range    Magnesium 2.07 1.60 - 2.40 mg/dL   Renal Function Panel    Collection Time: 07/14/23  6:39 AM   Result Value Ref Range    Glucose 96 74 - 99 mg/dL    Sodium 140 136 - 145 mmol/L    Potassium 4.0 3.5 - 5.3 mmol/L    Chloride 100 98 - 107 mmol/L    Bicarbonate 28 21 - 32 mmol/L    Anion Gap 16 10 - 20 mmol/L    Urea Nitrogen 10 6 - 23 mg/dL    Creatinine 0.39 (L) 0.50 - 1.05 mg/dL    GFR Female >90 >90 mL/min/1.73m2    Calcium 8.5 (L) 8.6 - 10.6 mg/dL    Phosphorus 4.1 2.5 - 4.9 mg/dL    Albumin 3.4 3.4 - 5.0 g/dL   CBC    Collection Time: 07/14/23  6:39 AM   Result Value Ref Range    WBC 13.6 (H) 4.4 - 11.3 x10E9/L    nRBC 0.3 0.0 - 0.0 /100 WBC    RBC 3.55 (L) 4.00 - 5.20 x10E12/L    Hemoglobin 10.4 (L) 12.0 - 16.0 g/dL    Hematocrit 32.5 (L) 36.0 - 46.0 %    MCV 92 80 - 100 fL    MCHC 32.0 32.0 - 36.0 g/dL    Platelets 374 150 - 450 x10E9/L    RDW 13.4 11.5 - 14.5 %   Magnesium    Collection Time:  07/15/23  2:36 AM   Result Value Ref Range    Magnesium CANCELED    CBC    Collection Time: 07/15/23  2:36 AM   Result Value Ref Range    WBC CANCELED     nRBC CANCELED     RBC CANCELED     Hemoglobin CANCELED     Hematocrit CANCELED     MCV CANCELED     MCHC CANCELED     Platelets CANCELED     RDW CANCELED    Renal Function Panel    Collection Time: 07/15/23  2:36 AM   Result Value Ref Range    Glucose CANCELED     Sodium CANCELED     Potassium CANCELED     Chloride CANCELED     Bicarbonate CANCELED     Anion Gap CANCELED     Urea Nitrogen CANCELED     Creatinine CANCELED     GFR Female CANCELED     GFR MALE CANCELED     Calcium CANCELED     Phosphorus CANCELED     Albumin CANCELED    CBC    Collection Time: 07/20/23 10:10 AM   Result Value Ref Range    WBC 14.7 (H) 4.4 - 11.3 x10E9/L    RBC 3.85 (L) 4.00 - 5.20 x10E12/L    Hemoglobin 11.0 (L) 12.0 - 16.0 g/dL    Hematocrit 35.3 (L) 36.0 - 46.0 %    MCV 92 80 - 100 fL    MCHC 31.2 (L) 32.0 - 36.0 g/dL    Platelets 556 (H) 150 - 450 x10E9/L    RDW 13.7 11.5 - 14.5 %   Magnesium    Collection Time: 07/20/23 10:10 AM   Result Value Ref Range    Magnesium 1.95 1.60 - 2.40 mg/dL   Comprehensive Metabolic Panel    Collection Time: 07/20/23 10:10 AM   Result Value Ref Range    Glucose 132 (H) 74 - 99 mg/dL    Sodium 141 136 - 145 mmol/L    Potassium 4.1 3.5 - 5.3 mmol/L    Chloride 105 98 - 107 mmol/L    Bicarbonate 27 21 - 32 mmol/L    Anion Gap 13 10 - 20 mmol/L    Urea Nitrogen 19 6 - 23 mg/dL    Creatinine 0.44 (L) 0.50 - 1.05 mg/dL    GFR Female >90 >90 mL/min/1.73m2    Calcium 8.7 8.6 - 10.3 mg/dL    Albumin 3.5 3.4 - 5.0 g/dL    Alkaline Phosphatase 102 33 - 110 U/L    Total Protein 6.1 (L) 6.4 - 8.2 g/dL    AST 13 9 - 39 U/L    Total Bilirubin 0.5 0.0 - 1.2 mg/dL    ALT (SGPT) 23 7 - 45 U/L   CBC and Auto Differential    Collection Time: 07/20/23 10:10 AM   Result Value Ref Range    WBC 14.7 (H) 4.4 - 11.3 x10E9/L    RBC 3.85 (L) 4.00 - 5.20 x10E12/L    Hemoglobin  11.0 (L) 12.0 - 16.0 g/dL    Hematocrit 35.3 (L) 36.0 - 46.0 %    MCV 92 80 - 100 fL    MCHC 31.2 (L) 32.0 - 36.0 g/dL    Platelets 556 (H) 150 - 450 x10E9/L    RDW 13.7 11.5 - 14.5 %    Neutrophils % 81.9 40.0 - 80.0 %    Immature Granulocytes %, Automated 0.7 0.0 - 0.9 %    Lymphocytes % 8.8 13.0 - 44.0 %    Monocytes % 7.0 2.0 - 10.0 %    Eosinophils % 1.3 0.0 - 6.0 %    Basophils % 0.3 0.0 - 2.0 %    Neutrophils Absolute 11.97 (H) 1.20 - 7.70 x10E9/L    Lymphocytes Absolute 1.28 1.20 - 4.80 x10E9/L    Monocytes Absolute 1.03 (H) 0.10 - 1.00 x10E9/L    Eosinophils Absolute 0.19 0.00 - 0.70 x10E9/L    Basophils Absolute 0.05 0.00 - 0.10 x10E9/L   B-Type Natriuretic Peptide    Collection Time: 07/20/23  7:42 PM   Result Value Ref Range     (H) 0 - 99 pg/mL   Troponin I, High Sensitivity    Collection Time: 07/20/23  7:42 PM   Result Value Ref Range    Troponin I 3 0 - 13 ng/L   Protime-INR    Collection Time: 07/20/23  7:42 PM   Result Value Ref Range    Protime 13.0 (H) 9.8 - 12.8 sec    INR 1.2 (H) 0.9 - 1.1   Magnesium    Collection Time: 07/20/23  7:42 PM   Result Value Ref Range    Magnesium 1.99 1.60 - 2.40 mg/dL   Urinalysis with Reflex Microscopic    Collection Time: 07/20/23  7:45 PM   Result Value Ref Range    Color, Urine CANCELED     Appearance, Urine CANCELED     Specific Gravity, Urine CANCELED     pH, Urine CANCELED     Protein, Urine CANCELED     Glucose, Urine CANCELED     Blood, Urine CANCELED     Ketones, Urine CANCELED     Bilirubin, Urine CANCELED     Urobilinogen, Urine CANCELED     Nitrite, Urine CANCELED     Leukocyte Esterase, Urine CANCELED     Ascorbic Acid CANCELED mg/dL   Urinalysis with Reflex Microscopic and Culture    Collection Time: 07/20/23  9:22 PM   Result Value Ref Range    Color, Urine Yellow STRAW,YELLOW    Appearance, Urine Clear CLEAR    Specific Gravity, Urine 1.055 (H) 1.005 - 1.035    pH, Urine 6.0 5.0 - 8.0    Protein, Urine Negative NEGATIVE mg/dL    Glucose,  Urine Negative NEGATIVE mg/dL    Blood, Urine SMALL(1+) (A) NEGATIVE    Ketones, Urine Negative NEGATIVE mg/dL    Bilirubin, Urine Negative NEGATIVE    Urobilinogen, Urine <2.0 0.0 - 1.9 mg/dL    Nitrite, Urine Negative NEGATIVE    Leukocyte Esterase, Urine Negative NEGATIVE   Urinalysis Microscopic Only    Collection Time: 07/20/23  9:22 PM   Result Value Ref Range    WBC, Urine 2 0 - 5 /HPF    RBC, Urine 24 (A) 0 - 5 /HPF    Mucus, Urine 1+ /LPF       ECG  No results found for this or any previous visit (from the past 4464 hour(s)).    Echocardiogram  No results found for this or any previous visit from the past 1095 days.      CV Studies:  EKG:No results found for this or any previous visit (from the past 4464 hour(s)).  Echocardiogram:   [place] [address]  Phone [phone] Fax [phone]    TRANSTHORACIC ECHOCARDIOGRAM REPORT      Patient Name:     BLANCHE MADRIGAL Reading Physician:   [id number] Karel Marshall MD  Study Date:       2/4/2022       Referring Physician: [id number] QUINTANILLA  MRN/PID:          [id number]       PCP:  Accession/Order#: [id number]   Department Location: [facility] Echo Lab  YOB: 1965      Fellow:  Gender:           F              Nurse:  Admit Date:                      Sonographer:         Mahi Mcintosh [facility]  Admission Status: Outpatient     Additional Staff:  Height:           165.10 cm      CC Report to:  Weight:           74.84 kg       Study Type:          Echocardiogram  BSA:              1.82 m2  Blood Pressure: 122 /88 mmHg    Diagnosis/ICD: R07.9-Chest pain, unspecified; I71.2-Thoracic aortic aneurysm,  without rupture  Indication:    Chest Pain  Procedure/CPT: Echo Complete w Full [id number]    Patient History:  Pertinent History: THORACIC ANEURYSM WITHOUT RUPTURE.    Study Detail: The following Echo studies were performed: M-Mode, 2D, Doppler and  color flow.      PHYSICIAN  INTERPRETATION:  Left Ventricle: The left ventricular systolic function is normal, with an estimated ejection fraction of 65-70%. There are no regional wall motion abnormalities. The left ventricular cavity size is normal. Spectral Doppler shows a normal pattern of left ventricular diastolic filling.  Left Atrium: The left atrium is normal in size.  Right Ventricle: The right ventricle is normal in size. There is normal right ventricular global systolic function.  Right Atrium: The right atrium is normal in size.  Aortic Valve: The aortic valve appears structurally normal. There is no evidence of aortic valve regurgitation. The peak instantaneous gradient of the aortic valve is 7.1 mmHg. The mean gradient of the aortic valve is 4.0 mmHg.  Mitral Valve: The mitral valve is normal in structure. There is no evidence of mitral valve regurgitation.  Tricuspid Valve: The tricuspid valve is structurally normal. There is trace tricuspid regurgitation.  Pulmonic Valve: The pulmonic valve is structurally normal. There is no indication of pulmonic valve regurgitation.  Pericardium: There is no pericardial effusion noted.  Aorta: The aortic root is normal.      CONCLUSIONS:  1. The left ventricular systolic function is normal with a 65-70% estimated ejection fraction.    QUANTITATIVE DATA SUMMARY:  2D MEASUREMENTS:  Normal Ranges:  Ao Root d:     2.70 cm   (2.0-3.7cm)  LAs:           2.60 cm   (2.7-4.0cm)  IVSd:          1.00 cm   (0.6-1.1cm)  LVPWd:         0.80 cm   (0.6-1.1cm)  LVIDd:         4.50 cm   (3.9-5.9cm)  LVIDs:         2.65 cm  LV Mass Index: 72.9 g/m2  LV % FS        41.1 %    LA VOLUME:  Normal Ranges:  LA Vol A4C:        25.7 ml    (22+/-6mL/m2)  LA Vol A2C:        25.4 ml  LA Vol BP:         25.9 ml  LA Vol Index A4C:  14.1ml/m2  LA Vol Index A2C:  13.9 ml/m2  LA Vol Index BP:   14.2 ml/m2  LA Area A4C:       11.0 cm2  LA Area A2C:       10.8 cm2  LA Major Axis A4C: 4.0 cm  LA Major Axis A2C: 3.9 cm  LA Volume  Index:   13.2 ml/m2  LA Vol A4C:        23.0 ml  LA Vol A2C:        24.0 ml    RA VOLUME BY A/L METHOD:  Normal Ranges:  RA Area A4C: 10.0 cm2    AORTA MEASUREMENTS:  Normal Ranges:  Ao Sinus, d: 2.70 cm (2.1-3.5cm)  Ao STJ, d:   2.60 cm (1.7-3.4cm)  Asc Ao, d:   4.80 cm (2.1-3.4cm)    LV SYSTOLIC FUNCTION BY 2D PLANIMETRY (MOD):  Normal Ranges:  EF-A4C View: 68.8 % (>55%)  EF-A2C View: 62.3 %  EF-Biplane:  65.8 %    LV DIASTOLIC FUNCTION:  Normal Ranges:  MV Peak E:        0.64 m/s    (0.7-1.2 m/s)  MV Peak A:        0.68 m/s    (0.42-0.7 m/s)  E/A Ratio:        0.94        (1.0-2.2)  MV e'             0.11 m/s    (>8.0)  MV lateral e'     0.13 m/s  MV medial e'      0.08 m/s  MV A Dur:         148.00 msec  E/e' Ratio:       6.07        (<8.0)  PulmV A Revs Dur: 169.00 msec    MITRAL VALVE:  Normal Ranges:  MV DT: 232 msec (150-240msec)    AORTIC VALVE:  Normal Ranges:  AoV Vmax:                1.33 m/s (<1.7m/s)  AoV Peak P.1 mmHg (<20mmHg)  AoV Mean P.0 mmHg (1.7-11.5mmHg)  LVOT Max Ruben:            0.92 m/s (<1.1m/s)  AoV VTI:                 28.40 cm (18-25cm)  LVOT VTI:                18.40 cm  LVOT Diameter:           1.70 cm  (1.8-2.4cm)  AoV Area, VTI:           1.47 cm2 (2.5-5.5cm2)  AoV Area,Vmax:           1.56 cm2 (2.5-4.5cm2)  AoV Dimensionless Index: 0.65    RIGHT VENTRICLE:  RV 1   2.8 cm  RV 2   2.0 cm  RV 3   6.0 cm  TAPSE: 23.0 mm  RV s'  0.13 m/s    TRICUSPID VALVE/RVSP:  Normal Ranges:  Peak TR Velocity: 2.31 m/s  RV Syst Pressure: 26.3 mmHg (< 30mmHg)  TV E Vmax:        0.53 m/s  (0.3-0.7m/s)  TV A Vmax:        0.42 m/s  IVC Diam:         1.20 cm    Pulmonary Veins:  PulmV A Revs Dur: 169.00 msec      [id number] Karel Marshall MD  Electronically signed on 2022 at 11:14:19 AM         Final     Stress Testing IMGRESULT([id number]):   NM CARDIAC STRESS REST (MYOCARDIAL PERFUSION MIBI) 2022    Narrative  MRN: [id number]  Patient Name: KAITLIN  BLANCHE    STUDY:  CARDIAC STRESS/REST INJECTION; PART 2 STRESS OR REST (NO CHARGE);  CARDIAC STRESS/REST (MYOCARDIAL PERFUSION/MIBI);  2/4/2022 9:21 am    INDICATION:  Chest Pain  R07.9: Chest pain I71.2: Thoracic aortic aneurysm without  rupture.    COMPARISON:  None.    ACCESSION NUMBER(S):  [id number]; [id number]; [id number]    ORDERING CLINICIAN:  JACKIE QUINTANILLA    TECHNIQUE:  DIVISION OF NUCLEAR MEDICINE  STRESS MYOCARDIAL PERFUSION SCAN, ONE DAY PROTOCOL    The patient received an intravenous dose of  11.6 mCi of Tc-99m  tetrofosmin and resting emission tomographic (SPECT) images of the  myocardium were acquired. The patient then exercised via treadmill  stress to  100 % of MPHR and achieved  6.0 METS. At peak stress  35.9  mCi of Tc-99m tetrofosmin were administered and stress phase SPECT  images of the myocardium were then acquired. These included ECG-gated  images to assess and quantify ventricular function.    FINDINGS:    There is a small fixed perfusion defect in the distal anterior wall  which resolves on prone imaging consistent with breast attenuation.  No reversible perfusion defects seen.    Calculated ejection fraction of 67% without segmental wall motion  abnormality seen.    Impression  1. There is a small fixed perfusion defect in the distal anterior  wall which resolves on prone imaging consistent with breast  attenuation. There is a low probability of ischemia.    2. Calculated ejection fraction of 67% without segmental wall motion  abnormality seen.    Cardiac Catheterization:   Adult Cath     Narrative  [facility], Cath Lab, [address] [place] [address]    Cardiovascular Catheterization Report    Patient Name:     BLANCHE MADRIGAL Performing Physician:  [id number] Delgado MD  Study Date:       5/24/2023      Verifying Physician:   [id number] Delgado MD  MRN/PID:          [id number]       Cardiologist/Co-scrub:  Accession/Order#: [id number]      Fellow:                Nery  Joyce HARDING  YOB: 1965      Fellow:  Gender:           F              Referring Physician:   KYLIE GORE  Admit Date:                      Referring Physician:   [id number] Conrad Dior MD  Surgeon:                         Referring Physician:   [id number] Conrad Dior MD      Study: Left Heart Catheterization      Indications:  BLANCHE MADRIGAL is a 58 year old female who presents with tobacco Use - former. Surgical risk clearance low, with an asymptomatic chest pain assessment. Study performed as an elective cath procedure.    Medical History:  Stress test performed: No. CTA performed: No. [place]at[place] accessed: No. LVEF Assessed: Yes. LVEF = 50%.    Procedure Description:  After infiltration with 2% Lidocaine, the right radial artery was cannulated with a modified Seldinger technique. Subsequently a 6 [place] sheath was placed in the right radial artery. Selective coronary catheterization was performed using a 5 Fr catheter(s) exchanged over a guide wire to cannulate the coronary arteries. A JL 5 tip catheter was used for left coronary injections. A JR 5 tip catheter was used for right coronary injections.  Multiple injections of contrast were made into the left and right coronary arteries with angiograms recorded in multiple projections. After completion of the procedure, the arterial sheath was pulled and a TR Band Radial Compression Device was utilized to obtain patent hemostasis.    Coronary Angiography:  The coronary circulation is co-dominant.    Left Main Coronary Artery:  The left main coronary artery is a normal caliber vessel. The left main arises normally from the left coronary sinus of Valsalva and bifurcates into the LAD and circumflex coronary arteries. The left main coronary artery showed no significant disease or stenosis greater than 30%.    Left Anterior Descending Coronary Artery Distribution:  The left anterior descending coronary artery is a normal caliber vessel. The LAD arises  normally from the left main coronary artery. The LAD demonstrated no significant disease or stenosis greater than 30%. The 1st diagonal branch showed no significant disease or stenosis greater than 30%. The 2nd diagonal branch demonstrated no significant disease or stenosis greater than 30%.    Circumflex Coronary Artery Distribution:  The circumflex coronary artery is a large caliber vessel. The circumflex arises normally from the left main coronary artery and terminates in the AV groove. The circumflex revealed no significant disease or stenosis greater than 30%. The 1st obtuse marginal branch showed no significant disease or stenosis greater than 30%. The left posterolateral branch showed no significant disease or stenosis greater than 30%. The left posterior descending artery showed no significant disease or stenosis greater than 30%.    Ramus Intermedius:  The ramus intermedius arises normally from the left main coronary artery. The ramus intermedius showed no significant disease or stenosis greater than 30%.    Right Coronary Artery Distribution:    The right coronary artery is a medium-sized caliber vessel. The RCA arises normally from the right sinus of Valsalva. The RCA showed no significant disease or stenosis greater than 30%. The acute marginal branch showed no significant disease or stenosis greater than 30%.  The right posterior descending artery showed no significant disease or stenosis greater than 30%.    Coronary Interventions:        Cardiac Cath Transition of Care Summary:  Post Procedure Diagnosis: No Significatn coronary artery disease.  Findings:                 Normal coronary arteries.  Blood Loss:               Estimated blood loss during the procedure was 3cc mls.  Specimens Removed:        Number of specimen(s) removed: none.    ____________________________________________________________________________________  CONCLUSIONS:  1. Angiographically unremarkable coronaries in a co-dominant  system.    ____________________________________________________________________________________  CPT Codes:  Left Heart Cath (visualization of coronaries) and LV-[id number]; Moderate Sedation Services initial 15 minutes patient >5 years-[id number]    ICD 10 Codes:  I71.21-Aneurysm of the ascending aorta, without rupture    [id number] Antonio Gore MD  Performing Physician  Electronically signed by [id number] Antonio Gore MD on 5/24/2023 at 11:46:03 AM      cc Report to: ANTONIO GORE    cc Report to: [id number] Conrad Dior MD    cc Report to: [id number] Conrad Dior MD           Final   No results found for this or any previous visit from the past 3650 days.     Cardiac Scoring: No results found for this or any previous visit from the past 1825 days.    AAA : No results found for this or any previous visit from the past 1825 days.    OTHER: No results found for this or any previous visit from the past 1825 days.    LAST IMAGING RESULTS  CT angio chest for pulmonary embolism  Narrative: Interpreted By:  PARVEEN LARSON MD  MRN: [id number]  Patient Name: BLANCHE MADRIGAL     STUDY:  CT ANGIO CHEST FOR PE;  7/20/2023 8:39 pm     INDICATION:  Weakness, shortness of breath, recent thoracic surgery .     COMPARISON:  07/11/2023 CT angiography chest     ACCESSION NUMBER(S):  [id number]     ORDERING CLINICIAN:  SARAH CONLEY     TECHNIQUE:  Contiguous axial images of the chest were obtained after the  intravenous administration of 75 mL Omnipaque 350 contrast using  angiographic PE protocol. Coronal and sagittal reformatted images  were reconstructed from the axial data. MIP images were created on an  independent workstation and reviewed.     FINDINGS:  MEDIASTINUM AND LYMPH NODES: There as been slight improvement in  edema and stranding in the mediastinum relating to residual  postoperative changes. There is no pneumomediastinum. There are no  loculated collections. Epicardial leads are again noted. Small hiatal  hernia. The esophagus appears within  normal limits. There are  multiple mildly enlarged mediastinal lymph nodes. For example, there  is a 1 cm right lower paratracheal lymph node, 1 cm aortopulmonary  window lymph node 1.2 cm subcarinal lymph node, overall similar to  prior study. Stable, partially calcified nodule in the left lower  thyroid gland resulting in enlargement of the left lobe.     VESSELS: Postsurgical changes of ascending aortic and lily arch  replacement are again noted. The hardware is intact. There is no  evidence for pseudoaneurysm, active hemorrhage, or new dissection.  Stable ectasia of the ascending thoracic aorta. Normal main pulmonary  artery diameter. No pulmonary embolism.     HEART: Mild cardiomegaly.  No coronary artery calcifications. Small  pericardial effusion, slightly decreased in size from 07/11/2023.     LUNG, AIRWAYS, AND PLEURA: Interval resolution of previous bilateral  pleural effusions. There is minimal bibasilar atelectasis. No  pulmonary edema, consolidation, or pneumothorax. There is mild  emphysema. Minimal mucous plugging in the right upper lobe.     OSSEOUS STRUCTURES: No acute osseous abnormality.     CHEST WALL SOFT TISSUES: No discernible abnormality.     UPPER ABDOMEN/OTHER: No acute abnormality.     Impression: 1. No acute pulmonary embolism or other acute cardiopulmonary  process. Interval resolution of bilateral pleural effusions and  marked improvement in now minimal bibasilar atelectasis.     2. Slightly improving postsurgical changes in the chest relating to  ascending aortic and lily arch repair. There is slight decrease in  pericardial effusion and stranding/fluid in the mediastinum. No  hardware complication, dissection, pseudoaneurysm, or active  extravasation.     3. Stable mildly reactive mediastinal lymphadenopathy.     XR chest 1 view  Narrative: Interpreted By:  ELMER RUIZ MD  MRN: [id number]  Patient Name: BLANCHE MADRIGAL     STUDY:  Chest dated  7/20/2023.     INDICATION:  Chest Pain      COMPARISON:  Chest dated 07/10/2020.     ACCESSION NUMBER(S):  [id number]     ORDERING CLINICIAN:  SARAH CONLEY     TECHNIQUE:  AP upright portable radiograph of the chest.     FINDINGS:  The lungs are clear.  No pneumothorax or effusion is evident. The  cardiomediastinal silhouette is  enlarged but similar to the prior  exam. This includes a degree of widening of the superior mediastinum  and at least a degree of tortuosity to the aorta. Surgical changes  are seen of the mediastinum.The soft tissues are grossly unremarkable.     Impression: No acute cardiopulmonary process is evident. If there is persistent  concern for thoracic process, given the ordering indication, CT may  be helpful.  ELECTROCARDIOGRAM RHYTHM STRIP  Ordered by an unspecified provider.    Problem List Items Addressed This Visit       Status post thoracic aortic aneurysm repair - Primary    Relevant Orders    ECG 12 Lead (Completed)    Dyslipidemia    Relevant Orders    ECG 12 Lead (Completed)    History of tobacco use    Relevant Orders    ECG 12 Lead (Completed)    Chest pain, unspecified    Relevant Orders    ECG 12 Lead (Completed)    Chronic cough    Relevant Orders    ECG 12 Lead (Completed)    Orthostasis     Other Visit Diagnoses       Dizziness                   Marty Redd DO, FACC, FACOI

## 2023-11-27 ENCOUNTER — OFFICE VISIT (OUTPATIENT)
Dept: CARDIOLOGY | Facility: CLINIC | Age: 58
End: 2023-11-27
Payer: COMMERCIAL

## 2023-11-27 VITALS
SYSTOLIC BLOOD PRESSURE: 144 MMHG | HEART RATE: 73 BPM | HEIGHT: 65 IN | DIASTOLIC BLOOD PRESSURE: 78 MMHG | BODY MASS INDEX: 32.49 KG/M2 | WEIGHT: 195 LBS

## 2023-11-27 DIAGNOSIS — Z98.890 STATUS POST THORACIC AORTIC ANEURYSM REPAIR: Primary | ICD-10-CM

## 2023-11-27 DIAGNOSIS — I95.1 ORTHOSTASIS: ICD-10-CM

## 2023-11-27 DIAGNOSIS — R07.9 CHEST PAIN, UNSPECIFIED TYPE: ICD-10-CM

## 2023-11-27 DIAGNOSIS — Z86.79 STATUS POST THORACIC AORTIC ANEURYSM REPAIR: Primary | ICD-10-CM

## 2023-11-27 DIAGNOSIS — R42 DIZZINESS: ICD-10-CM

## 2023-11-27 DIAGNOSIS — E78.5 DYSLIPIDEMIA: ICD-10-CM

## 2023-11-27 DIAGNOSIS — Z87.891 HISTORY OF TOBACCO USE: ICD-10-CM

## 2023-11-27 DIAGNOSIS — R05.3 CHRONIC COUGH: ICD-10-CM

## 2023-11-27 PROCEDURE — 93000 ELECTROCARDIOGRAM COMPLETE: CPT | Performed by: INTERNAL MEDICINE

## 2023-11-27 PROCEDURE — 99214 OFFICE O/P EST MOD 30 MIN: CPT | Performed by: INTERNAL MEDICINE

## 2023-11-27 PROCEDURE — 1036F TOBACCO NON-USER: CPT | Performed by: INTERNAL MEDICINE

## 2023-11-27 PROCEDURE — 3008F BODY MASS INDEX DOCD: CPT | Performed by: INTERNAL MEDICINE

## 2023-11-27 RX ORDER — LORATADINE 10 MG/1
1 TABLET ORAL DAILY
COMMUNITY
Start: 2022-03-08 | End: 2024-05-31 | Stop reason: WASHOUT

## 2023-11-27 RX ORDER — UMECLIDINIUM BROMIDE AND VILANTEROL TRIFENATATE 62.5; 25 UG/1; UG/1
1 POWDER RESPIRATORY (INHALATION) DAILY
COMMUNITY
End: 2023-12-28

## 2023-11-27 RX ORDER — FLUTICASONE PROPIONATE 50 MCG
SPRAY, SUSPENSION (ML) NASAL
COMMUNITY
Start: 2021-11-05

## 2023-11-27 RX ORDER — CITALOPRAM 40 MG/1
40 TABLET, FILM COATED ORAL DAILY
COMMUNITY

## 2023-11-27 RX ORDER — ALBUTEROL SULFATE 90 UG/1
AEROSOL, METERED RESPIRATORY (INHALATION) EVERY 4 HOURS
COMMUNITY
Start: 2022-03-08

## 2023-11-27 RX ORDER — FAMOTIDINE 20 MG/1
TABLET, FILM COATED ORAL EVERY 24 HOURS
COMMUNITY
Start: 2022-10-10

## 2023-11-27 RX ORDER — OMEPRAZOLE 40 MG/1
CAPSULE, DELAYED RELEASE ORAL
COMMUNITY
Start: 2021-10-04 | End: 2024-05-31 | Stop reason: WASHOUT

## 2023-11-27 RX ORDER — BUSPIRONE HYDROCHLORIDE 10 MG/1
10 TABLET ORAL 2 TIMES DAILY
COMMUNITY

## 2023-11-27 RX ORDER — LISINOPRIL 5 MG/1
5 TABLET ORAL DAILY
COMMUNITY

## 2023-11-27 RX ORDER — METOPROLOL TARTRATE 50 MG/1
25 TABLET ORAL 2 TIMES DAILY
Qty: 90 TABLET | Refills: 1 | Status: SHIPPED | OUTPATIENT
Start: 2023-11-27 | End: 2024-11-26

## 2023-11-27 RX ORDER — PRAVASTATIN SODIUM 20 MG/1
20 TABLET ORAL EVERY EVENING
COMMUNITY

## 2023-12-06 ENCOUNTER — HOSPITAL ENCOUNTER (EMERGENCY)
Facility: HOSPITAL | Age: 58
Discharge: HOME | End: 2023-12-06
Payer: COMMERCIAL

## 2023-12-06 ENCOUNTER — APPOINTMENT (OUTPATIENT)
Dept: RADIOLOGY | Facility: HOSPITAL | Age: 58
End: 2023-12-06
Payer: COMMERCIAL

## 2023-12-06 VITALS
BODY MASS INDEX: 29.99 KG/M2 | RESPIRATION RATE: 20 BRPM | DIASTOLIC BLOOD PRESSURE: 68 MMHG | SYSTOLIC BLOOD PRESSURE: 138 MMHG | HEIGHT: 65 IN | HEART RATE: 99 BPM | OXYGEN SATURATION: 95 % | WEIGHT: 180 LBS | TEMPERATURE: 96.5 F

## 2023-12-06 DIAGNOSIS — M94.0 COSTOCHONDRITIS, ACUTE: Primary | ICD-10-CM

## 2023-12-06 PROCEDURE — 99283 EMERGENCY DEPT VISIT LOW MDM: CPT

## 2023-12-06 PROCEDURE — 71046 X-RAY EXAM CHEST 2 VIEWS: CPT | Mod: FY

## 2023-12-06 PROCEDURE — 71046 X-RAY EXAM CHEST 2 VIEWS: CPT | Performed by: RADIOLOGY

## 2023-12-06 ASSESSMENT — PAIN DESCRIPTION - PAIN TYPE: TYPE: ACUTE PAIN

## 2023-12-06 ASSESSMENT — LIFESTYLE VARIABLES
EVER FELT BAD OR GUILTY ABOUT YOUR DRINKING: NO
HAVE PEOPLE ANNOYED YOU BY CRITICIZING YOUR DRINKING: NO
REASON UNABLE TO ASSESS: NO
HAVE YOU EVER FELT YOU SHOULD CUT DOWN ON YOUR DRINKING: NO
EVER HAD A DRINK FIRST THING IN THE MORNING TO STEADY YOUR NERVES TO GET RID OF A HANGOVER: NO

## 2023-12-06 ASSESSMENT — PAIN DESCRIPTION - ORIENTATION: ORIENTATION: RIGHT

## 2023-12-06 ASSESSMENT — PAIN DESCRIPTION - LOCATION: LOCATION: CHEST

## 2023-12-06 ASSESSMENT — PAIN SCALES - GENERAL: PAINLEVEL_OUTOF10: 2

## 2023-12-06 ASSESSMENT — PAIN DESCRIPTION - DESCRIPTORS: DESCRIPTORS: ACHING

## 2023-12-06 ASSESSMENT — COLUMBIA-SUICIDE SEVERITY RATING SCALE - C-SSRS
1. IN THE PAST MONTH, HAVE YOU WISHED YOU WERE DEAD OR WISHED YOU COULD GO TO SLEEP AND NOT WAKE UP?: NO
6. HAVE YOU EVER DONE ANYTHING, STARTED TO DO ANYTHING, OR PREPARED TO DO ANYTHING TO END YOUR LIFE?: NO
2. HAVE YOU ACTUALLY HAD ANY THOUGHTS OF KILLING YOURSELF?: NO

## 2023-12-06 ASSESSMENT — PAIN - FUNCTIONAL ASSESSMENT: PAIN_FUNCTIONAL_ASSESSMENT: 0-10

## 2023-12-06 NOTE — PROGRESS NOTES
Emergency Medicine Transition of Care Note.    I received Cassidy Arnold in signout from April CNP.  Please see the previous ED provider note for all HPI, PE and MDM up to the time of signout at 1600. This is in addition to the primary record.    In brief Cassidy Arnold is an 58 y.o. female presenting for   Chief Complaint   Patient presents with    Chest Pain     C/O RIGHT SIDE CHEST PAIN THAT COMES AND GOES, WORSE WITH MOVEMENT     At the time of signout we were awaiting: CXR    Diagnoses as of 12/06/23 1844   Costochondritis, acute       Medical Decision Making    This patient was seen in the emergency department with an attending physician available at all times throughout their ED course    The patient on reassessment has pain to the right of the xiphoid process to the medial and inferior process of her right breast, the pain is at a specific point, hurts worse with palpation, hurts worse with specific movements.  The patient states that at rest and at certain positions it is completely pain-free, that this came after a hard day at work the last couple of days where she states that she has had large amounts of torso rotation and heavy lifting.    The patient's EKG was done at 1544 on 12/6, it was interpreted by the attending physician as no STEMI, the EKG as interpreted by me shows diffuse artifact as well as significant baseline sway, however no new ST abnormality is appreciated.    The patient denies any specific chest pain, shortness of breath, and states that it is a pain just with touching in 1 specific spot.    The x-ray is negative for any acute abnormality, there is no foreign body or migrated wire appreciated.  Through shared decision-making, though a CT of the chest is considered, the patient is at low risk for ACS at this time, and no additional imaging or diagnostic studies will be done.  The patient is requesting discharge at this time and will follow-up with her cardiologist as needed.      Patient's ED course is most consistent with right-sided costochondritis    Patient is amenable to the plan of discharge as outlined above, all patient's questions pertaining to their ED course were answered in their entirety.  Strict return precautions were discussed with the patient and they verbalized understanding.  Further, it was made clear to the patient that from an emergent basis, all effort and testing was done to eliminate any imminent dangerous or potentially dangerous conditions of the patient however if their symptoms get much worse or feel life-threatening, they are to return to the emergency department or call 911 immediately.  Final diagnoses:   [M94.0] Costochondritis, acute           Procedure  Procedures    Jonathan Hall, APRN-CNP

## 2023-12-06 NOTE — ED PROVIDER NOTES
"HPI   Chief Complaint   Patient presents with    Chest Pain     C/O RIGHT SIDE CHEST PAIN THAT COMES AND GOES, WORSE WITH MOVEMENT       Patient is a 58-year-old female with past medical history of open heart surgery in July who presents emergency room today with a complaint of new onset, intermittent, right sided chest pain.  She states the pain started approximately 2 days ago.  She denies any known injury or trauma.  She reports \"it feels like there is a wire that is poking me\" and \" my  thought he felt a wire yesterday\".  She attests to certain movements and twisting making the pain worse.  Rest makes the pain better.  She states she has never had anything like this before.  She denies any other symptoms including chest pain, difficulty breathing, cough, flulike symptoms, nausea, vomiting fevers or chills.  She has no other complaints or concerns at this time.                          [place] Coma Scale Score: 15                  Patient History   No past medical history on file.  No past surgical history on file.  No family history on file.  Social History     Tobacco Use    Smoking status: Never    Smokeless tobacco: Never   Substance Use Topics    Alcohol use: Not on file    Drug use: Not on file       Physical Exam   ED Triage Vitals   Temp Heart Rate Resp BP   12/06/23 1415 12/06/23 1415 12/06/23 1415 12/06/23 1417   35.8 °C (96.5 °F) 99 20 138/68      SpO2 Temp Source Heart Rate Source Patient Position   12/06/23 1415 12/06/23 1415 -- 12/06/23 1415   95 % Tympanic  Sitting      BP Location FiO2 (%)     12/06/23 1415 --     Left arm        Physical Exam  Vitals and nursing note reviewed.   Constitutional:       General: She is not in acute distress.     Appearance: She is well-developed. She is not ill-appearing, toxic-appearing or diaphoretic.   HENT:      Head: Normocephalic and atraumatic.   Eyes:      Extraocular Movements: Extraocular movements intact.      Pupils: Pupils are equal, round, and " "reactive to light.   Cardiovascular:      Rate and Rhythm: Normal rate and regular rhythm.      Heart sounds: Normal heart sounds.   Pulmonary:      Effort: Pulmonary effort is normal. No tachypnea, accessory muscle usage or respiratory distress.      Breath sounds: Normal breath sounds. No stridor.   Chest:      Chest wall: Tenderness present. No mass, deformity, crepitus or edema.   Abdominal:      Palpations: Abdomen is soft.   Musculoskeletal:         General: Normal range of motion.      Cervical back: Normal range of motion.   Skin:     General: Skin is warm and dry.      Capillary Refill: Capillary refill takes less than 2 seconds.      Coloration: Skin is not cyanotic or pale.      Findings: No ecchymosis, erythema or rash.      Nails: There is no clubbing.   Neurological:      General: No focal deficit present.      Mental Status: She is alert and oriented to person, place, and time.   Psychiatric:         Mood and Affect: Mood normal.         Behavior: Behavior normal.         ED Course & MDM        Medical Decision Making  Patient is a 58-year-old female with past medical history of open heart surgery in July who presents emergency room today with a complaint of new onset, intermittent, right sided chest pain.  She states the pain started approximately 2 days ago.  She denies any known injury or trauma.  She reports \"it feels like there is a wire that is poking me\" and \" my  thought he felt a wire yesterday\".  She attests to certain movements and twisting making the pain worse.  Rest makes the pain better.  She states she has never had anything like this before.  She denies any other symptoms including chest pain, difficulty breathing, cough, flulike symptoms, nausea, vomiting fevers or chills.  Upon initial assessment patient is alert and oriented.  She is in no acute distress.  She is answering questions appropriately.  She is afebrile appears well-hydrated.  Vitals are within normal limits.  " Physical exam is remarkable for mild tenderness with palpation to the right chest under the right breast.  No obvious signs of injury or trauma.  Lung sounds are clear bilaterally.     Ddx: Foreign body, costochondritis, pleuritis, other    Chest x-ray ordered to rule out any foreign bodies or other acute process.  I did offer to provide the patient with ibuprofen however she states she has some her purse and she would rather take those.    Signed out to CLINT Christian at 16:00.         Procedure  Procedures     RODRIGUE Villegas  12/06/23 1622       April RODRIGUE Gilliland  12/06/23 1630

## 2023-12-27 DIAGNOSIS — J44.9 CHRONIC OBSTRUCTIVE PULMONARY DISEASE, UNSPECIFIED COPD TYPE (MULTI): Primary | ICD-10-CM

## 2023-12-28 RX ORDER — UMECLIDINIUM BROMIDE AND VILANTEROL TRIFENATATE 62.5; 25 UG/1; UG/1
1 POWDER RESPIRATORY (INHALATION) DAILY
Qty: 60 EACH | Refills: 0 | Status: SHIPPED | OUTPATIENT
Start: 2023-12-28 | End: 2024-01-25

## 2024-01-25 DIAGNOSIS — J44.9 CHRONIC OBSTRUCTIVE PULMONARY DISEASE, UNSPECIFIED COPD TYPE (MULTI): ICD-10-CM

## 2024-01-25 RX ORDER — UMECLIDINIUM BROMIDE AND VILANTEROL TRIFENATATE 62.5; 25 UG/1; UG/1
1 POWDER RESPIRATORY (INHALATION) DAILY
Qty: 60 EACH | Refills: 5 | Status: SHIPPED | OUTPATIENT
Start: 2024-01-25

## 2024-02-23 ENCOUNTER — HOSPITAL ENCOUNTER (OUTPATIENT)
Dept: RADIOLOGY | Facility: HOSPITAL | Age: 59
Discharge: HOME | End: 2024-02-23
Payer: COMMERCIAL

## 2024-02-23 ENCOUNTER — APPOINTMENT (OUTPATIENT)
Dept: RADIOLOGY | Facility: HOSPITAL | Age: 59
End: 2024-02-23
Payer: COMMERCIAL

## 2024-02-23 ENCOUNTER — HOSPITAL ENCOUNTER (EMERGENCY)
Facility: HOSPITAL | Age: 59
Discharge: HOME | End: 2024-02-23
Attending: EMERGENCY MEDICINE
Payer: COMMERCIAL

## 2024-02-23 VITALS
HEART RATE: 68 BPM | TEMPERATURE: 98.3 F | HEIGHT: 65 IN | SYSTOLIC BLOOD PRESSURE: 131 MMHG | RESPIRATION RATE: 16 BRPM | OXYGEN SATURATION: 99 % | DIASTOLIC BLOOD PRESSURE: 73 MMHG | WEIGHT: 196 LBS | BODY MASS INDEX: 32.65 KG/M2

## 2024-02-23 DIAGNOSIS — M25.562 ACUTE PAIN OF LEFT KNEE: Primary | ICD-10-CM

## 2024-02-23 DIAGNOSIS — I80.02 SAPHENOUS VEIN PHLEBITIS, LEFT: ICD-10-CM

## 2024-02-23 PROCEDURE — 93971 EXTREMITY STUDY: CPT

## 2024-02-23 PROCEDURE — 73560 X-RAY EXAM OF KNEE 1 OR 2: CPT | Mod: LEFT SIDE | Performed by: RADIOLOGY

## 2024-02-23 PROCEDURE — 99284 EMERGENCY DEPT VISIT MOD MDM: CPT | Mod: 25

## 2024-02-23 PROCEDURE — 76882 US LMTD JT/FCL EVL NVASC XTR: CPT | Mod: FOREIGN READ | Performed by: RADIOLOGY

## 2024-02-23 PROCEDURE — 73560 X-RAY EXAM OF KNEE 1 OR 2: CPT | Mod: LT

## 2024-02-23 ASSESSMENT — PAIN SCALES - GENERAL
PAINLEVEL_OUTOF10: 10 - WORST POSSIBLE PAIN
PAINLEVEL_OUTOF10: 4
PAINLEVEL_OUTOF10: 10 - WORST POSSIBLE PAIN

## 2024-02-23 ASSESSMENT — PAIN - FUNCTIONAL ASSESSMENT
PAIN_FUNCTIONAL_ASSESSMENT: 0-10

## 2024-02-23 ASSESSMENT — PAIN DESCRIPTION - DESCRIPTORS: DESCRIPTORS: SHARP

## 2024-02-23 ASSESSMENT — PAIN DESCRIPTION - PAIN TYPE: TYPE: ACUTE PAIN

## 2024-02-23 ASSESSMENT — PAIN DESCRIPTION - ONSET: ONSET: GRADUAL

## 2024-02-23 ASSESSMENT — PAIN DESCRIPTION - LOCATION
LOCATION: KNEE
LOCATION: KNEE

## 2024-02-23 ASSESSMENT — PAIN DESCRIPTION - ORIENTATION
ORIENTATION: LEFT
ORIENTATION: LEFT

## 2024-02-23 ASSESSMENT — PAIN DESCRIPTION - FREQUENCY: FREQUENCY: CONSTANT/CONTINUOUS

## 2024-02-23 NOTE — ED PROVIDER NOTES
HPI   Chief Complaint   Patient presents with    pain behind left knee       HPI: []  58-year-old white female history of hypertension, aortic surgery recently comes in with atraumatic left knee pain.  She said for last few days she developed pain behind the left knee atraumatic no trauma no falls no fever no chills no knee swelling hurts with movement.  Pain behind the knee.  Went to see her primary doctor scheduled for outpatient ultrasound but not scheduled till next couple weeks.  Pain getting worse.  She did not have leg swelling.  She denies calf swelling denies history of DVT or PE in the past.  Denies taking blood thinners.    Past history: Hypertension, aortic surgery  Social: Patient denies current tobacco alcohol drug abuse.  REVIEW OF SYSTEMS:    GENERAL.: No weight loss, fatigue, anorexia, insomnia, fever.    EYES: No vision loss, double vision, drainage, eye pain.    ENT: No pharyngitis, dry mouth.    CARDIOPULMONARY: No chest pain, palpitations, syncope, near syncope. No shortness of breath, cough, hemoptysis.    GI: No abdominal pain, change in bowel habits, melena, hematemesis, hematochezia, nausea, vomiting, diarrhea.    : No discharge, dysuria, frequency, urgency, hematuria.    MS: No limb pain, positive left knee pain, no joint swelling.    SKIN: No rashes.    PSYCH: No depression, anxiety, suicidality, homicidality.    Review of systems is otherwise negative unless stated above or in history of present illness.  Social history, family history, allergies reviewed.  PHYSICAL EXAM:    GENERAL: Vitals noted, no distress. Alert and oriented  x 3. Non-toxic.      EENT: TMs clear. Posterior oropharynx unremarkable. No meningismus. No LAD.     NECK: Supple. Nontender. No midline tenderness.     CARDIAC: Regular, rate, rhythm. No murmurs rubs or gallops. No JVD    PULMONARY: Lungs clear bilaterally with good aeration. No wheezes rales or rhonchi. No respiratory distress.     ABDOMEN: Soft, nonsurgical.  Nontender. No peritoneal signs. Normoactive bowel sounds. No pulsatile masses.     EXTREMITIES: No peripheral edema. Negative Homans bilaterally, no cords.  2+ bounding pulses well-perfused.  Left knee has no obvious erythema redness swelling ecchymosis bruising no joint effusion good range of motion.  He is tender palpation over her posterior knee over the hamstring insertion site, she has no pulsatile masses she has good range of motion and soft supple calf with bounding pulses.    SKIN: No rash. Intact.  Surgical scar sternal from aortic surgery well-healed  NEURO: No focal neurologic deficits, NIH score of 0. Cranial nerves normal as tested from II through XII.     MEDICAL DECISION MAKING:  X-ray the knee shows osteopenia DJD no fractures ultrasound of the lower extremity shows no DVT but did show thrombophlebitis in the lesser saphenous vein.    Treatment in ED: None    ED course: Patient remains asymptomatic patient made aware of the sound/X results made aware of the thrombophlebitis    Impression: #1 knee pain, #2 thrombophlebitis lesser saphenous vein.    Plan/MDM: 58-year-old female comes in with atraumatic left knee pain workup concerning for thrombophlebitis less saphenous vein.  No signs of septic joint or joint effusion low concern for DVT or septic joint or joint effusion, patient made aware of the abnormal findings will be discharged home supportive care advised to repeat ultrasound in a week's time to make sure there is no clot progression with strict return precaution.                          [place] Coma Scale Score: 15                     Patient History   No past medical history on file.  No past surgical history on file.  No family history on file.  Social History     Tobacco Use    Smoking status: Never    Smokeless tobacco: Never   Substance Use Topics    Alcohol use: Not on file    Drug use: Not on file       Physical Exam   ED Triage Vitals [02/23/24 0936]   Temperature Heart Rate  Respirations BP   36.8 °C (98.3 °F) 68 16 131/73      Pulse Ox Temp Source Heart Rate Source Patient Position   99 % Temporal Monitor Standing      BP Location FiO2 (%)     Left arm --       Physical Exam    ED Course & MDM   ED Course as of 02/23/24 1306   Fri Feb 23, 2024   1244 Patient's x-ray of the knee is negative for any traumatic injury has some osteopenia and some arthritis.  Ultrasound no DVT did show thrombophlebitis of the left lesser saphenous vein.  Patient be discharged home supportive care close outpatient follow-up repeat ultrasound in a week's time to rule out clot progression with strict return precaution. [MT]      ED Course User Index  [MT] Marianne Linares MD         Diagnoses as of 02/23/24 1306   Acute pain of left knee   Saphenous vein phlebitis, left       Medical Decision Making      Procedure  Procedures     Marianne Linares MD  02/23/24 1309

## 2024-03-06 ENCOUNTER — APPOINTMENT (OUTPATIENT)
Dept: VASCULAR MEDICINE | Facility: HOSPITAL | Age: 59
End: 2024-03-06
Payer: COMMERCIAL

## 2024-03-11 ENCOUNTER — HOSPITAL ENCOUNTER (EMERGENCY)
Facility: HOSPITAL | Age: 59
Discharge: HOME | End: 2024-03-11
Attending: EMERGENCY MEDICINE
Payer: COMMERCIAL

## 2024-03-11 VITALS
DIASTOLIC BLOOD PRESSURE: 80 MMHG | BODY MASS INDEX: 32.65 KG/M2 | TEMPERATURE: 98.6 F | WEIGHT: 196 LBS | RESPIRATION RATE: 20 BRPM | HEIGHT: 65 IN | OXYGEN SATURATION: 97 % | SYSTOLIC BLOOD PRESSURE: 134 MMHG | HEART RATE: 90 BPM

## 2024-03-11 DIAGNOSIS — M25.562 ACUTE PAIN OF LEFT KNEE: Primary | ICD-10-CM

## 2024-03-11 PROCEDURE — 99282 EMERGENCY DEPT VISIT SF MDM: CPT

## 2024-03-11 ASSESSMENT — PAIN - FUNCTIONAL ASSESSMENT: PAIN_FUNCTIONAL_ASSESSMENT: 0-10

## 2024-03-11 ASSESSMENT — PAIN DESCRIPTION - PAIN TYPE: TYPE: ACUTE PAIN

## 2024-03-11 ASSESSMENT — PAIN SCALES - GENERAL: PAINLEVEL_OUTOF10: 8

## 2024-03-11 ASSESSMENT — COLUMBIA-SUICIDE SEVERITY RATING SCALE - C-SSRS
1. IN THE PAST MONTH, HAVE YOU WISHED YOU WERE DEAD OR WISHED YOU COULD GO TO SLEEP AND NOT WAKE UP?: NO
2. HAVE YOU ACTUALLY HAD ANY THOUGHTS OF KILLING YOURSELF?: NO
6. HAVE YOU EVER DONE ANYTHING, STARTED TO DO ANYTHING, OR PREPARED TO DO ANYTHING TO END YOUR LIFE?: NO

## 2024-03-11 ASSESSMENT — PAIN DESCRIPTION - ORIENTATION: ORIENTATION: LEFT

## 2024-03-11 ASSESSMENT — PAIN DESCRIPTION - LOCATION: LOCATION: KNEE

## 2024-03-11 ASSESSMENT — PAIN DESCRIPTION - DESCRIPTORS: DESCRIPTORS: THROBBING

## 2024-03-11 ASSESSMENT — PAIN DESCRIPTION - FREQUENCY: FREQUENCY: CONSTANT/CONTINUOUS

## 2024-03-12 NOTE — ED PROVIDER NOTES
HPI   Chief Complaint   Patient presents with    Knee Pain     C/o lt knee pain x 3 weeks was seen here for pain back of knee now has pain front of lt knee x 4 days  Ibuprofen 0900        Patient presents with left knee pain has been going on for approximately a month.  She was seen by her primary care provider.  She had an x-ray of her knee.  She is taking ibuprofen which does seem to help.  Pain is worse with ambulation.  There is no trauma or injury.  X-rays were unremarkable she was told.  She denies any swelling.                          [place] Coma Scale Score: 15                     Patient History   No past medical history on file.  No past surgical history on file.  No family history on file.  Social History     Tobacco Use    Smoking status: Never    Smokeless tobacco: Never   Substance Use Topics    Alcohol use: Not on file    Drug use: Not on file       Physical Exam   ED Triage Vitals [03/11/24 2031]   Temperature Heart Rate Respirations BP   37 °C (98.6 °F) 90 20 134/80      Pulse Ox Temp Source Heart Rate Source Patient Position   97 % Tympanic Monitor Sitting      BP Location FiO2 (%)     Left arm --       Physical Exam  Vitals and nursing note reviewed.   Constitutional:       General: She is not in acute distress.     Appearance: She is well-developed.   HENT:      Head: Normocephalic and atraumatic.   Eyes:      Conjunctiva/sclera: Conjunctivae normal.   Cardiovascular:      Rate and Rhythm: Normal rate and regular rhythm.      Heart sounds: No murmur heard.  Pulmonary:      Effort: Pulmonary effort is normal. No respiratory distress.      Breath sounds: Normal breath sounds.   Abdominal:      Palpations: Abdomen is soft.      Tenderness: There is no abdominal tenderness.   Musculoskeletal:         General: No swelling or deformity. Normal range of motion.      Cervical back: Neck supple.      Comments: .  Full range of motion.  She has some medial joint line tenderness.  Negative Jessi's.  No  instability of the joint.  Normal skin.   Skin:     General: Skin is warm and dry.      Capillary Refill: Capillary refill takes less than 2 seconds.   Neurological:      Mental Status: She is alert.   Psychiatric:         Mood and Affect: Mood normal.         ED Course & MDM   Diagnoses as of 03/11/24 6231   Acute pain of left knee       Medical Decision Making  Differential diagnosis meniscus tear, osteoarthritis, sprain, etc.    Patient has medial joint line tenderness pain and may be osteoarthritis may be meniscal.  She has already had an x-ray.  She can follow-up with orthopedics.  Her pain is persisting.  She can use acetaminophen and patient and the ibuprofen medical records were reviewed.  Patient will be discharged.        Procedure  Procedures     Mich Arboleda MD  03/11/24 4654

## 2024-03-13 PROCEDURE — 99283 EMERGENCY DEPT VISIT LOW MDM: CPT

## 2024-03-14 ENCOUNTER — HOSPITAL ENCOUNTER (EMERGENCY)
Facility: HOSPITAL | Age: 59
Discharge: HOME | End: 2024-03-14
Attending: STUDENT IN AN ORGANIZED HEALTH CARE EDUCATION/TRAINING PROGRAM
Payer: COMMERCIAL

## 2024-03-14 VITALS
HEIGHT: 65 IN | DIASTOLIC BLOOD PRESSURE: 85 MMHG | BODY MASS INDEX: 32.65 KG/M2 | OXYGEN SATURATION: 98 % | TEMPERATURE: 96.9 F | WEIGHT: 196 LBS | HEART RATE: 87 BPM | RESPIRATION RATE: 16 BRPM | SYSTOLIC BLOOD PRESSURE: 149 MMHG

## 2024-03-14 DIAGNOSIS — N60.11 MASTITIS CHRONIC, RIGHT: Primary | ICD-10-CM

## 2024-03-14 PROCEDURE — 2500000001 HC RX 250 WO HCPCS SELF ADMINISTERED DRUGS (ALT 637 FOR MEDICARE OP): Performed by: STUDENT IN AN ORGANIZED HEALTH CARE EDUCATION/TRAINING PROGRAM

## 2024-03-14 RX ORDER — ACETAMINOPHEN 325 MG/1
650 TABLET ORAL ONCE
Status: COMPLETED | OUTPATIENT
Start: 2024-03-14 | End: 2024-03-14

## 2024-03-14 RX ORDER — CLINDAMYCIN HYDROCHLORIDE 150 MG/1
450 CAPSULE ORAL ONCE
Status: COMPLETED | OUTPATIENT
Start: 2024-03-14 | End: 2024-03-14

## 2024-03-14 RX ORDER — CLINDAMYCIN HYDROCHLORIDE 150 MG/1
450 CAPSULE ORAL 4 TIMES DAILY
Qty: 168 CAPSULE | Refills: 0 | Status: SHIPPED | OUTPATIENT
Start: 2024-03-14 | End: 2024-03-28

## 2024-03-14 RX ADMIN — CLINDAMYCIN HYDROCHLORIDE 450 MG: 150 CAPSULE ORAL at 01:09

## 2024-03-14 RX ADMIN — ACETAMINOPHEN 650 MG: 325 TABLET ORAL at 01:10

## 2024-03-14 RX ADMIN — IBUPROFEN 600 MG: 200 TABLET, FILM COATED ORAL at 01:10

## 2024-03-14 ASSESSMENT — PAIN SCALES - GENERAL: PAINLEVEL_OUTOF10: 8

## 2024-03-14 NOTE — ED PROVIDER NOTES
HPI   Chief Complaint   Patient presents with    Breast Pain     Right breast redness started a couple days ago       This is a 58-year-old female who presents to the emergency department for right breast pain.  Patient is about 2 days ago she noticed some redness and pain in her right breast.  She has redness overlying it.  Is never happened to her before.  She denies any fevers, chills, no nausea, no vomiting no other issues or concerns.                          [place] Coma Scale Score: 15                  Patient History   No past medical history on file.  No past surgical history on file.  No family history on file.  Social History     Tobacco Use    Smoking status: Never    Smokeless tobacco: Never   Substance Use Topics    Alcohol use: Not on file    Drug use: Not on file       Physical Exam   ED Triage Vitals [03/14/24 0006]   Temperature Heart Rate Respirations BP   36.1 °C (96.9 °F) 87 16 149/85      Pulse Ox Temp src Heart Rate Source Patient Position   98 % -- -- --      BP Location FiO2 (%)     -- --       Physical Exam  Constitutional:       General: She is not in acute distress.  HENT:      Head: Normocephalic and atraumatic.      Right Ear: Tympanic membrane normal.      Left Ear: Tympanic membrane normal.      Mouth/Throat:      Mouth: Mucous membranes are moist.   Eyes:      Extraocular Movements: Extraocular movements intact.      Conjunctiva/sclera: Conjunctivae normal.      Pupils: Pupils are equal, round, and reactive to light.   Cardiovascular:      Rate and Rhythm: Normal rate and regular rhythm.      Heart sounds: No murmur heard.  Pulmonary:      Effort: Pulmonary effort is normal. No respiratory distress.      Breath sounds: Normal breath sounds. No stridor. No wheezing or rales.   Abdominal:      General: Bowel sounds are normal. There is no distension.      Tenderness: There is no abdominal tenderness. There is no guarding or rebound.   Musculoskeletal:         General: No swelling,  tenderness or deformity. Normal range of motion.   Skin:     General: Skin is warm and dry.      Coloration: Skin is not jaundiced.      Findings: No bruising or lesion.      Comments: Erythema and warmth around her right breast, no fluctuance palpated.  Tenderness to palpation of right breast   Neurological:      General: No focal deficit present.      Mental Status: She is alert and oriented to person, place, and time. Mental status is at baseline.      Cranial Nerves: No cranial nerve deficit.      Motor: No weakness.   Psychiatric:         Mood and Affect: Mood normal.       Labs Reviewed - No data to display  No orders to display       ED Course & MDM   Diagnoses as of 03/14/24 0057   Mastitis chronic, right       Medical Decision Making  This is a 58-year-old female presents emergency department for right breast pain.  She denies any systemic symptoms.  Physical exam does reveal what appears to be mastitis but there is no signs of a fluctuance that needs to be drained.  Due to patient's prolonged allergy last clindamycin was selected for her first-line medication.  Patient will follow-up with her primary care provider and she verbalized obtaining return precautions.  This time she is safe for discharge home.        Procedure  Procedures     Vianey Oconnor MD  03/14/24 0058

## 2024-03-21 ENCOUNTER — OFFICE VISIT (OUTPATIENT)
Dept: ORTHOPEDIC SURGERY | Facility: CLINIC | Age: 59
End: 2024-03-21
Payer: COMMERCIAL

## 2024-03-21 VITALS — HEIGHT: 66 IN | BODY MASS INDEX: 31.5 KG/M2 | WEIGHT: 196 LBS

## 2024-03-21 DIAGNOSIS — M70.52 PES ANSERINUS BURSITIS OF LEFT KNEE: ICD-10-CM

## 2024-03-21 PROCEDURE — 99204 OFFICE O/P NEW MOD 45 MIN: CPT | Performed by: SPECIALIST

## 2024-03-21 PROCEDURE — 3008F BODY MASS INDEX DOCD: CPT | Performed by: SPECIALIST

## 2024-03-21 PROCEDURE — 1036F TOBACCO NON-USER: CPT | Performed by: SPECIALIST

## 2024-03-21 RX ORDER — METHYLPREDNISOLONE 4 MG/1
4 TABLET ORAL ONCE
Qty: 1 TABLET | Refills: 0 | Status: SHIPPED | OUTPATIENT
Start: 2024-03-21 | End: 2024-03-21

## 2024-03-21 NOTE — PROGRESS NOTES
New patient referred by [facility] ED for left knee pain  Pain started 5 weeks started in the back of knee and then moved to the front of the knee 4 weeks ago. 3 weeks ago she was going down the stairs and she got a random shooting pain.   Tender to touch. Denies any injury   Icy hot, knee brace doesn't help  Takes 600 mg ibuprofen to help take the edge off.  Denies other constitutional symptoms.  Xr 2/23/24    Exam: No knee effusion no signs of erythema or or warmth of the knee today.  Exquisite pain to palpation in the region of the proximal medial tibia, region of the pes anserine bursa.  Less pain over the medial joint line and posterior regions of the knee.  Full extension flexion past 120 but with anterior pain.  Dermis intact neurovascular intact no other acute findings.    Radiographs: Reviewed from ED show no acute abnormalities.    Assessment and plan: Left knee pes anserine bursitis.  An attempt in the office to inject the bursa today was not tolerated.  Therefore we placed her on a Medrol Dosepak, told her to stop taking NSAIDs during the Dosepak, and suggested she get right into physical therapy for range of motion strengthening and modalities to the bursa, probably benefiting from ionopheresis.

## 2024-03-26 ENCOUNTER — APPOINTMENT (OUTPATIENT)
Dept: ORTHOPEDIC SURGERY | Facility: CLINIC | Age: 59
End: 2024-03-26
Payer: COMMERCIAL

## 2024-03-29 ENCOUNTER — HOSPITAL ENCOUNTER (OUTPATIENT)
Dept: RADIOLOGY | Facility: HOSPITAL | Age: 59
Discharge: HOME | End: 2024-03-29
Payer: COMMERCIAL

## 2024-03-29 VITALS — HEIGHT: 65 IN | WEIGHT: 190 LBS | BODY MASS INDEX: 31.65 KG/M2

## 2024-03-29 DIAGNOSIS — Z12.31 ENCOUNTER FOR SCREENING MAMMOGRAM FOR MALIGNANT NEOPLASM OF BREAST: ICD-10-CM

## 2024-03-29 PROCEDURE — 77067 SCR MAMMO BI INCL CAD: CPT

## 2024-03-29 PROCEDURE — 77063 BREAST TOMOSYNTHESIS BI: CPT

## 2024-04-15 PROBLEM — E66.9 OBESITY (BMI 30-39.9): Status: ACTIVE | Noted: 2024-04-15

## 2024-04-15 PROBLEM — R42 DIZZINESS: Status: ACTIVE | Noted: 2024-04-15

## 2024-05-09 ENCOUNTER — HOSPITAL ENCOUNTER (OUTPATIENT)
Dept: RADIOLOGY | Facility: EXTERNAL LOCATION | Age: 59
Discharge: HOME | End: 2024-05-09
Payer: COMMERCIAL

## 2024-05-09 DIAGNOSIS — R05.9 COUGH, UNSPECIFIED TYPE: ICD-10-CM

## 2024-05-14 ENCOUNTER — OFFICE VISIT (OUTPATIENT)
Dept: PULMONOLOGY | Facility: HOSPITAL | Age: 59
End: 2024-05-14
Payer: COMMERCIAL

## 2024-05-14 VITALS
OXYGEN SATURATION: 94 % | HEART RATE: 69 BPM | BODY MASS INDEX: 32.12 KG/M2 | HEIGHT: 65 IN | WEIGHT: 192.8 LBS | TEMPERATURE: 95.9 F | RESPIRATION RATE: 16 BRPM | DIASTOLIC BLOOD PRESSURE: 78 MMHG | SYSTOLIC BLOOD PRESSURE: 129 MMHG

## 2024-05-14 DIAGNOSIS — J44.9 COPD, MILD (MULTI): Primary | ICD-10-CM

## 2024-05-14 DIAGNOSIS — J30.9 ALLERGIC RHINITIS, UNSPECIFIED SEASONALITY, UNSPECIFIED TRIGGER: ICD-10-CM

## 2024-05-14 DIAGNOSIS — Z87.891 FORMER SMOKER: ICD-10-CM

## 2024-05-14 PROCEDURE — 99213 OFFICE O/P EST LOW 20 MIN: CPT | Performed by: NURSE PRACTITIONER

## 2024-05-14 RX ORDER — MONTELUKAST SODIUM 10 MG/1
10 TABLET ORAL NIGHTLY
Qty: 30 TABLET | Refills: 11 | Status: SHIPPED | OUTPATIENT
Start: 2024-05-14

## 2024-05-14 RX ORDER — PREDNISONE 10 MG/1
TABLET ORAL DAILY
Qty: 40 TABLET | Refills: 0 | Status: SHIPPED | OUTPATIENT
Start: 2024-05-14 | End: 2024-05-30

## 2024-05-14 ASSESSMENT — ENCOUNTER SYMPTOMS
FEVER: 0
FATIGUE: 0
CHILLS: 0
UNEXPECTED WEIGHT CHANGE: 0
COUGH: 1
SHORTNESS OF BREATH: 1
WHEEZING: 1
RHINORRHEA: 0

## 2024-05-14 NOTE — PATIENT INSTRUCTIONS
Please take prednisone taper until gone.   Continue on Anoro one puff daily.   Continue albuterol as needed.   Continue Loratadine and Flonase, and start back on Singulair at bedtime.  Please get CT scan of your chest in the end of July.  Call with any questions or concerns.  Follow up with me in 6 months.

## 2024-05-14 NOTE — PROGRESS NOTES
Subjective   Patient ID: Cassidy Arnold is a 58 y.o. female who presents for follow up    HPI: Patient has PMH of nicotine dependence, GERD, CAD, aortic aneurysm, anxiety, and depression. She was referred for chronic cough. She states that she has been having a daily cough, which is mostly dry but will bring up some phlegm. She hears herself wheezing as well. She does have sinus drainage and congestion, she was recently started on Montelukast and Cetirizine, which has helped her symptoms. She has not been started on any inhalers yet. She denies any fever, chills, chest pain, leg swelling, or hemoptysis. She is a current smoker at 1 ppd and has smoked for the past 25 years. She currently works in the [employer] at [employer], she does work with cleaning supplies. She denies growing up on a farm or having barn animals.      She states that she was able to successfully quit smoking on October 31st, 2022. She states breathing is stable on Anoro. She is not needing to use albuterol often. She had her aortic aneurysm surgery in July and doing well.    Today she is here for follow up. She states that her breathing has been worse lately and she has had chest tightness. She was seen in urgent care and had CXR 5/9 that was normal. She was given a few days of steroids and antibiotics. She states albuterol helps some. She has been wheezing. Allergic rhinitis symptoms have been worse, she states she used to take Singulair and that helped. She has no other concerns.     Review of Systems   Constitutional:  Negative for chills, fatigue, fever and unexpected weight change.   HENT:  Positive for congestion. Negative for postnasal drip and rhinorrhea.    Respiratory:  Positive for cough (denies hemoptysis.), shortness of breath and wheezing.    Cardiovascular:  Negative for chest pain and leg swelling.   All other systems reviewed and are negative.      Objective   Physical Exam  Vitals reviewed.   Constitutional:       Appearance: Normal  appearance.   HENT:      Head: Normocephalic.   Cardiovascular:      Rate and Rhythm: Normal rate and regular rhythm.   Pulmonary:      Effort: Pulmonary effort is normal.      Breath sounds: Wheezing (faint) present.   Skin:     General: Skin is warm and dry.   Neurological:      Mental Status: She is alert.         Assessment/Plan   1. COPD  2. Nicotine dependence  3. Allergic rhinitis  4. GERD     -PFTs with FEV1/FVC 80, FEV1 76-80 discussed results and consider mild COPD, AAT normal. Continue Anoro one puff once a day, she has noticed significant benefit from this, and albuterol prn. I reviewed CT from January and she does have centrilobular emphysema. She had most recent CT post surgery done July 20th, no suspicious nodules, she will qualify for LDCT July 2024, order placed today. Will give her an extended prednisone taper she is still wheezing slightly on exam.      -She was able to successfully quit smoking October 31st with Chantix and doing well with cravings, prior she smoked 1 ppd and has smoked for the past 25 years.      -IGE 49, RAST negative. Continue Loratadine and Flonase, start back on Singulair.      -GERD symptoms are currently controlled with omeprazole.      Overall we will continue current regimen and start her back on Montelukast. I will give her a prednisone taper today suspect exacerbation was triggered by allergies. She is seeing cardiology at the end of the month also. I will bring her back with me in 6 months. I instructed her to call sooner if no improvement.

## 2024-05-27 NOTE — PROGRESS NOTES
[facility]    Patient Name: Cassidy Arnold  Patient : 1965      Scribe Attestation  By signing my name below, IEva Scribdonald   attest that this documentation has been prepared under the direction and in the presence of Marty Redd DO.      Reason for visit:  This is a 58-year-old female here for follow-up regarding thoracic ascending aortic aneurysm status post replacement of the ascending aorta and hemiarch using a 30 cm Gelweave vascular graft completed in 2023, dyslipidemia, lightheadedness when changing positions quickly, tobacco use, and obesity.     HPI:  This is a 58-year-old female here for follow-up regarding thoracic ascending aortic aneurysm status post replacement of the ascending aorta and hemiarch using a 30 cm Gelweave vascular graft completed in 2023, dyslipidemia, lightheadedness when changing positions quickly, tobacco use, and obesity.  The patient was last evaluated by me in 2023.  At that visit I ordered blood work including CMP/lipid/magnesium/CBC to be drawn in 6 months and asked the patient to follow-up in 6 months and sooner if necessary. ECG done today showed sinus rhythm with a heart rate of 87 bpm.  The patient reports random, brief, intermittent episodes of palpitations. She denies any associated chest pain, shortness of breath or lightheadedness. She states that she takes all of her medications as prescribed. During my exam, she was resting comfortably on the exam table.              Assessment/Plan:   1. Thoracic ascending aortic aneurysm  The patient has a history of thoracic ascending aortic aneurysm and underwent replacement of the ascending aorta with hemiarch using a 30 cm Gelweave vascular graft in 2023.   Intraoperative TANIA done 2023 showed normal left ventricular systolic function, normal right ventricular systolic function, no significant valve abnormalities.   She should continue to  follow with CT surgery.    2. Palpitations  The patient reports random, brief, intermittent palpitations as noted in the HPI.  ECG done today showed sinus rhythm with a heart rate of 87 bpm.    Intraoperative TANIA done 7/7/2023 showed normal left ventricular systolic function, normal right ventricular systolic function, no significant valve abnormalities.   She should continue metoprolol for heart rate control.   Holter monitor and lab works were ordered as noted below.   Patient should follow general recommendations including avoiding excessive alcohol intake, avoiding excessive caffeine intake, staying well-hydrated, getting an appropriate amount of sleep.  Follow up in 3 months and sooner if necessary.     3. Dyslipidemia  Lipid panel done 2/4/2022 showed an LDL cholesterol of 83 and triglycerides of 58 while not on any lipid-lowering medications.   She is currently on pravastatin 20 mg daily.   Lipid panel was ordered as noted below.  Please see lifestyle recommendations below.     4. Dizziness  The previously reported dizziness when quickly changing positions had improved since the last visit.  ECG done today showed sinus rhythm with a heart rate of 87 bpm.     Blood pressure appears controlled on  exam today.  Intraoperative TANIA done 7/7/2023 showed normal left ventricular systolic function, normal right ventricular systolic function, no significant valve abnormalities.   Lab works were ordered as noted below.   Patient should follow general recommendations including staying well-hydrated, changing the positions slowly, wearing compression stockings as necessary, and routine exercise.   Follow up in 3 months and sooner if necessary.      5 History of tobacco use   The patient is a former smoker.  I discussed with her the importance of continued smoking cessation.     6. Obesity  Please see lifestyle recommendations below.       Orders:   CMP/lipid/magnesium/CBC/TSH   Holter monitor  Follow-up in 3  months    Lifestyle Recommendations  I recommend a whole-food plant-based diet, an eating pattern that encourages the consumption of unrefined plant foods (such as fruits, vegetables, tubers, whole grains, legumes, nuts and seeds) and discourages meats, dairy products, eggs and processed foods.     The AHA/ACC recommends that the patient consume a dietary pattern that emphasizes intake of vegetables, fruits, and whole grains; includes low-fat dairy products, poultry, fish, legumes, non-tropical vegetable oils, and nuts; and limits intake of sodium, sweets, sugar-sweetened beverages, and red meats.  Adapt this dietary pattern to appropriate calorie requirements (a 500-750 kcal/day deficit to loose weight), personal and cultural food preferences, and nutrition therapy for other medical conditions (including diabetes).  Achieve this pattern by following plans such as the [place] Mediterranean, DASH dietary pattern, or AHA diet.     Engage in 2 hours and 30 minutes per week of moderate-intensity physical activity, or 1 hour and 15 minutes (75 minutes) per week of vigorous-intensity aerobic physical activity, or an equivalent combination of moderate and vigorous-intensity aerobic physical activity. Aerobic activity should be performed in episodes of at least 10 minutes preferably spread throughout the week.     Adhering to a heart healthy diet, regular exercise habits, avoidance of tobacco products, and maintenance of a healthy weight are crucial components of their heart disease risk reduction.     Any positive review of systems not specifically addressed in the office visit today should be evaluated and treated by the patients primary care physician or in an emergency department if necessary     Patient was notified that results from ordered tests will be called to the patient if it changes current management; it will otherwise be discussed at a future appointment and available on  [employer].     Thank you for allowing  me to participate in the care of this patient.        This document was generated using the assistance of voice recognition software. If there are any errors of spelling, grammar, syntax, or meaning; please feel free to contact me directly for clarification.    Past Medical History:  She has no past medical history on file.    Past Surgical History:  She has no past surgical history on file.      Social History:  She reports that she has never smoked. She has never used smokeless tobacco. No history on file for alcohol use and drug use.    Family History:  No family history on file.     Allergies:  Amoxicillin-pot clavulanate, Tetracyclines, Metronidazole, and Sulfa (sulfonamide antibiotics)    Outpatient Medications:  Current Outpatient Medications   Medication Instructions    albuterol 90 mcg/actuation inhaler Every 4 hours    Anoro Ellipta 62.5-25 mcg/actuation blister with device 1 puff, inhalation, Daily    busPIRone (BUSPAR) 10 mg, oral, 2 times daily    citalopram (CELEXA) 40 mg, oral, Daily    famotidine (Pepcid) 20 mg tablet Every 24 hours    fluticasone (Flonase) 50 mcg/actuation nasal spray nasal    lidocaine 4 % patch APPLY 1 PATCH TO THE AFFECTED AREA AND LEAVE IN PLACE FOR 12 HOURS, THEN REMOVE AND LEAVE OFF FOR 12 HOURS.    lisinopril 5 mg, oral, Daily    loratadine (Claritin) 10 mg tablet 1 tablet, oral, Daily    metoprolol tartrate (LOPRESSOR) 25 mg, oral, 2 times daily    montelukast (SINGULAIR) 10 mg, oral, Nightly    omeprazole (PriLOSEC) 40 mg DR capsule oral    pravastatin (PRAVACHOL) 20 mg, oral, Every evening    predniSONE (Deltasone) 10 mg tablet Take 4 tablets (40 mg) by mouth once daily for 4 days, THEN 3 tablets (30 mg) once daily for 4 days, THEN 2 tablets (20 mg) once daily for 4 days, THEN 1 tablet (10 mg) once daily for 4 days.        ROS:  A 14 point review of systems was done and is negative other than as stated in HPI    Vitals:      12/6/2023     2:17 PM 2/23/2024     9:36 AM  "3/11/2024     8:31 PM 3/14/2024    12:06 AM 3/21/2024    11:08 AM 3/29/2024     6:56 AM 5/14/2024     1:08 PM   Vitals   Systolic 138 131 134 149   129   Diastolic 68 73 80 85   78   Heart Rate  68 90 87   69   Temp  36.8 °C (98.3 °F) 37 °C (98.6 °F) 36.1 °C (96.9 °F)   35.5 °C (95.9 °F)   Resp  16 20 16   16   Height (in)  1.651 m (5' 5\") 1.651 m (5' 5\") 1.651 m (5' 5\") 1.676 m (5' 6\") 1.651 m (5' 5\") 1.651 m (5' 5\")   Weight (lb)  196 196 196 196 190 192.8   BMI  32.62 kg/m2 32.62 kg/m2 32.62 kg/m2 31.64 kg/m2 31.62 kg/m2 32.08 kg/m2   BSA (m2)  2.02 m2 2.02 m2 2.02 m2 2.03 m2 1.99 m2 2 m2        Physical Exam:   Constitutional: Cooperative, in no acute distress, alert, appears stated age.  Skin: Skin color, texture, turgor normal. No rashes or lesions.  Head: Normocephalic. No masses, lesions, tenderness or abnormalities  Eyes: Extraocular movements are grossly intact.  Mouth and throat: Mucous membranes moist  Neck: Neck supple, no carotid bruits, no JVD  Respiratory: Lungs clear to auscultation, no wheezing or rhonchi, no use of accessory muscles  Chest wall: Surgical scar, normal excursion with respiration  Cardiovascular: Regular rhythm without murmur, gallop, or rubs  Gastrointestinal: Abdomen soft, nontender. Bowel sounds normal. Obese.  Musculoskeletal: Strength equal in upper extremities  Extremities: No pitting edema  Neurologic: Sensation grossly intact, alert and oriented x3        Intake/Output:   No intake/output data recorded.    Outpatient Medications  Current Outpatient Medications on File Prior to Visit   Medication Sig Dispense Refill    albuterol 90 mcg/actuation inhaler every 4 hours.      Anoro Ellipta 62.5-25 mcg/actuation blister with device Inhale 1 puff by mouth once daily 60 each 5    busPIRone (Buspar) 10 mg tablet Take 1 tablet (10 mg) by mouth 2 times a day.      citalopram (CeleXA) 40 mg tablet Take 1 tablet (40 mg) by mouth once daily.      famotidine (Pepcid) 20 mg tablet once " every 24 hours.      fluticasone (Flonase) 50 mcg/actuation nasal spray Administer into affected nostril(s).      lidocaine 4 % patch APPLY 1 PATCH TO THE AFFECTED AREA AND LEAVE IN PLACE FOR 12 HOURS, THEN REMOVE AND LEAVE OFF FOR 12 HOURS. (Patient not taking: Reported on 11/27/2023) 7 patch 0    lisinopril 5 mg tablet Take 1 tablet (5 mg) by mouth once daily.      loratadine (Claritin) 10 mg tablet Take 1 tablet (10 mg) by mouth once daily.      metoprolol tartrate (Lopressor) 50 mg tablet Take 0.5 tablets by mouth 2 times a day. 90 tablet 1    montelukast (Singulair) 10 mg tablet Take 1 tablet (10 mg) by mouth once daily at bedtime. 30 tablet 11    omeprazole (PriLOSEC) 40 mg DR capsule Take by mouth.      pravastatin (Pravachol) 20 mg tablet Take 1 tablet (20 mg) by mouth once daily in the evening.      predniSONE (Deltasone) 10 mg tablet Take 4 tablets (40 mg) by mouth once daily for 4 days, THEN 3 tablets (30 mg) once daily for 4 days, THEN 2 tablets (20 mg) once daily for 4 days, THEN 1 tablet (10 mg) once daily for 4 days. 40 tablet 0     No current facility-administered medications on file prior to visit.       Labs: (past 26 weeks)  No results found for this or any previous visit (from the past 4368 hour(s)).    ECG  Encounter Date: 11/27/23   ECG 12 Lead    Narrative    Sinus rhythm, nonspecific T abnormality, heart rate 70 bpm.       Echocardiogram  No results found for this or any previous visit from the past 1095 days.      CV Studies:  EKG:  Encounter Date: 11/27/23   ECG 12 Lead    Narrative    Sinus rhythm, nonspecific T abnormality, heart rate 70 bpm.     Echocardiogram:   Echocardiogram     [place] [address]  Phone [phone] Fax [phone]    TRANSTHORACIC ECHOCARDIOGRAM REPORT      Patient Name:     BLANCHE Cruz Physician:   [id number] Karel Marshall MD  Study Date:       2/4/2022       Referring Physician: [id number] MEJIA  SOCORRO  MRN/PID:          [id number]       PCP:  Accession/Order#: [id number]   Department Location: [facility]  YOB: 1965      Fellow:  Gender:           F              Nurse:  Admit Date:                      Sonographer:         Mahi Mcintosh CHRISTY  Admission Status: Outpatient     Additional Staff:  Height:           165.10 cm      CC Report to:  Weight:           74.84 kg       Study Type:          Echocardiogram  BSA:              1.82 m2  Blood Pressure: 122 /88 mmHg    Diagnosis/ICD: R07.9-Chest pain, unspecified; I71.2-Thoracic aortic aneurysm,  without rupture  Indication:    Chest Pain  Procedure/CPT: Echo Complete w Full Doppler-[id number]    Patient History:  Pertinent History: THORACIC ANEURYSM WITHOUT RUPTURE.    Study Detail: The following Echo studies were performed: M-Mode, 2D, Doppler and  color flow.      PHYSICIAN INTERPRETATION:  Left Ventricle: The left ventricular systolic function is normal, with an estimated ejection fraction of 65-70%. There are no regional wall motion abnormalities. The left ventricular cavity size is normal. Spectral Doppler shows a normal pattern of left ventricular diastolic filling.  Left Atrium: The left atrium is normal in size.  Right Ventricle: The right ventricle is normal in size. There is normal right ventricular global systolic function.  Right Atrium: The right atrium is normal in size.  Aortic Valve: The aortic valve appears structurally normal. There is no evidence of aortic valve regurgitation. The peak instantaneous gradient of the aortic valve is 7.1 mmHg. The mean gradient of the aortic valve is 4.0 mmHg.  Mitral Valve: The mitral valve is normal in structure. There is no evidence of mitral valve regurgitation.  Tricuspid Valve: The tricuspid valve is structurally normal. There is trace tricuspid regurgitation.  Pulmonic Valve: The pulmonic valve is structurally normal. There is no indication of pulmonic valve  regurgitation.  Pericardium: There is no pericardial effusion noted.  Aorta: The aortic root is normal.      CONCLUSIONS:  1. The left ventricular systolic function is normal with a 65-70% estimated ejection fraction.    QUANTITATIVE DATA SUMMARY:  2D MEASUREMENTS:  Normal Ranges:  Ao Root d:     2.70 cm   (2.0-3.7cm)  LAs:           2.60 cm   (2.7-4.0cm)  IVSd:          1.00 cm   (0.6-1.1cm)  LVPWd:         0.80 cm   (0.6-1.1cm)  LVIDd:         4.50 cm   (3.9-5.9cm)  LVIDs:         2.65 cm  LV Mass Index: 72.9 g/m2  LV % FS        41.1 %    LA VOLUME:  Normal Ranges:  LA Vol A4C:        25.7 ml    (22+/-6mL/m2)  LA Vol A2C:        25.4 ml  LA Vol BP:         25.9 ml  LA Vol Index A4C:  14.1ml/m2  LA Vol Index A2C:  13.9 ml/m2  LA Vol Index BP:   14.2 ml/m2  LA Area A4C:       11.0 cm2  LA Area A2C:       10.8 cm2  LA Major Axis A4C: 4.0 cm  LA Major Axis A2C: 3.9 cm  LA Volume Index:   13.2 ml/m2  LA Vol A4C:        23.0 ml  LA Vol A2C:        24.0 ml    RA VOLUME BY A/L METHOD:  Normal Ranges:  RA Area A4C: 10.0 cm2    AORTA MEASUREMENTS:  Normal Ranges:  Ao Sinus, d: 2.70 cm (2.1-3.5cm)  Ao STJ, d:   2.60 cm (1.7-3.4cm)  Asc Ao, d:   4.80 cm (2.1-3.4cm)    LV SYSTOLIC FUNCTION BY 2D PLANIMETRY (MOD):  Normal Ranges:  EF-A4C View: 68.8 % (>55%)  EF-A2C View: 62.3 %  EF-Biplane:  65.8 %    LV DIASTOLIC FUNCTION:  Normal Ranges:  MV Peak E:        0.64 m/s    (0.7-1.2 m/s)  MV Peak A:        0.68 m/s    (0.42-0.7 m/s)  E/A Ratio:        0.94        (1.0-2.2)  MV e'             0.11 m/s    (>8.0)  MV lateral e'     0.13 m/s  MV medial e'      0.08 m/s  MV A Dur:         148.00 msec  E/e' Ratio:       6.07        (<8.0)  PulmV A Revs Dur: 169.00 msec    MITRAL VALVE:  Normal Ranges:  MV DT: 232 msec (150-240msec)    AORTIC VALVE:  Normal Ranges:  AoV Vmax:                1.33 m/s (<1.7m/s)  AoV Peak P.1 mmHg (<20mmHg)  AoV Mean P.0 mmHg (1.7-11.5mmHg)  LVOT Max Ruben:            0.92 m/s  (<1.1m/s)  AoV VTI:                 28.40 cm (18-25cm)  LVOT VTI:                18.40 cm  LVOT Diameter:           1.70 cm  (1.8-2.4cm)  AoV Area, VTI:           1.47 cm2 (2.5-5.5cm2)  AoV Area,Vmax:           1.56 cm2 (2.5-4.5cm2)  AoV Dimensionless Index: 0.65    RIGHT VENTRICLE:  RV 1   2.8 cm  RV 2   2.0 cm  RV 3   6.0 cm  TAPSE: 23.0 mm  RV s'  0.13 m/s    TRICUSPID VALVE/RVSP:  Normal Ranges:  Peak TR Velocity: 2.31 m/s  RV Syst Pressure: 26.3 mmHg (< 30mmHg)  TV E Vmax:        0.53 m/s  (0.3-0.7m/s)  TV A Vmax:        0.42 m/s  IVC Diam:         1.20 cm    Pulmonary Veins:  PulmV A Revs Dur: 169.00 msec      [id number] Karel Marshall MD  Electronically signed on 2/4/2022 at 11:14:19 AM         Final     Stress Testing IMGRESULT(BCD0600:1:1825):   NM CARDIAC STRESS REST (MYOCARDIAL PERFUSION MIBI) 02/04/2022    Narrative  MRN: [id number]  Patient Name: BLANCHE MADRIGAL    STUDY:  CARDIAC STRESS/REST INJECTION; PART 2 STRESS OR REST (NO CHARGE);  CARDIAC STRESS/REST (MYOCARDIAL PERFUSION/MIBI);  2/4/2022 9:21 am    INDICATION:  Chest Pain  R07.9: Chest pain I71.2: Thoracic aortic aneurysm without  rupture.    COMPARISON:  None.    ACCESSION NUMBER(S):  [id number]; [id number]; [id number]    ORDERING CLINICIAN:  JACKIE QUINTANILLA    TECHNIQUE:  DIVISION OF NUCLEAR MEDICINE  STRESS MYOCARDIAL PERFUSION SCAN, ONE DAY PROTOCOL    The patient received an intravenous dose of  11.6 mCi of Tc-99m  tetrofosmin and resting emission tomographic (SPECT) images of the  myocardium were acquired. The patient then exercised via treadmill  stress to  100 % of MPHR and achieved  6.0 METS. At peak stress  35.9  mCi of Tc-99m tetrofosmin were administered and stress phase SPECT  images of the myocardium were then acquired. These included ECG-gated  images to assess and quantify ventricular function.    FINDINGS:    There is a small fixed perfusion defect in the distal anterior wall  which resolves on prone imaging consistent with breast attenuation.  No  reversible perfusion defects seen.    Calculated ejection fraction of 67% without segmental wall motion  abnormality seen.    Impression  1. There is a small fixed perfusion defect in the distal anterior  wall which resolves on prone imaging consistent with breast  attenuation. There is a low probability of ischemia.    2. Calculated ejection fraction of 67% without segmental wall motion  abnormality seen.    Cardiac Catheterization:   Adult Cath     Narrative  [facility], Cath Lab, [address] [place] [address]    Cardiovascular Catheterization Report    Patient Name:     BLANCHE MADRIGAL Performing Physician:  [id number] Delgado MD  Study Date:       5/24/2023      Verifying Physician:   [id number] Delgado MD  MRN/PID:          [id number]       Cardiologist/Co-scrub:  Accession/Order#: [id number]      Fellow:                Nery Cook MD  YOB: 1965      Fellow:  Gender:           F              Referring Physician:   KYLIE DELGADO  Admit Date:                      Referring Physician:   [id number] Conrad Dior MD  Surgeon:                         Referring Physician:   [id number] Conrad Dior MD      Study: Left Heart Catheterization      Indications:  BLANCHE MADRIGAL is a 58 year old female who presents with tobacco Use - former. Surgical risk clearance low, with an asymptomatic chest pain assessment. Study performed as an elective cath procedure.    Medical History:  Stress test performed: No. CTA performed: No. Agatston accessed: No. LVEF Assessed: Yes. LVEF = 50%.    Procedure Description:  After infiltration with 2% Lidocaine, the right radial artery was cannulated with a modified Seldinger technique. Subsequently a 6 Salvadorean sheath was placed in the right radial artery. Selective coronary catheterization was performed using a 5 Fr catheter(s) exchanged over a guide wire to cannulate the coronary arteries. A JL 5 tip catheter was used for left coronary injections. A JR 5 tip  catheter was used for right coronary injections.  Multiple injections of contrast were made into the left and right coronary arteries with angiograms recorded in multiple projections. After completion of the procedure, the arterial sheath was pulled and a TR Band Radial Compression Device was utilized to obtain patent hemostasis.    Coronary Angiography:  The coronary circulation is co-dominant.    Left Main Coronary Artery:  The left main coronary artery is a normal caliber vessel. The left main arises normally from the left coronary sinus of Valsalva and bifurcates into the LAD and circumflex coronary arteries. The left main coronary artery showed no significant disease or stenosis greater than 30%.    Left Anterior Descending Coronary Artery Distribution:  The left anterior descending coronary artery is a normal caliber vessel. The LAD arises normally from the left main coronary artery. The LAD demonstrated no significant disease or stenosis greater than 30%. The 1st diagonal branch showed no significant disease or stenosis greater than 30%. The 2nd diagonal branch demonstrated no significant disease or stenosis greater than 30%.    Circumflex Coronary Artery Distribution:  The circumflex coronary artery is a large caliber vessel. The circumflex arises normally from the left main coronary artery and terminates in the AV groove. The circumflex revealed no significant disease or stenosis greater than 30%. The 1st obtuse marginal branch showed no significant disease or stenosis greater than 30%. The left posterolateral branch showed no significant disease or stenosis greater than 30%. The left posterior descending artery showed no significant disease or stenosis greater than 30%.    Ramus Intermedius:  The ramus intermedius arises normally from the left main coronary artery. The ramus intermedius showed no significant disease or stenosis greater than 30%.    Right Coronary Artery Distribution:    The right coronary  artery is a medium-sized caliber vessel. The RCA arises normally from the right sinus of Valsalva. The RCA showed no significant disease or stenosis greater than 30%. The acute marginal branch showed no significant disease or stenosis greater than 30%.  The right posterior descending artery showed no significant disease or stenosis greater than 30%.    Coronary Interventions:    Cardiac Cath Transition of Care Summary:  Post Procedure Diagnosis: No Significatn coronary artery disease.  Findings:                 Normal coronary arteries.  Blood Loss:               Estimated blood loss during the procedure was 3cc mls.  Specimens Removed:        Number of specimen(s) removed: none.    ____________________________________________________________________________________  CONCLUSIONS:  1. Angiographically unremarkable coronaries in a co-dominant system.    ____________________________________________________________________________________  CPT Codes:  Left Heart Cath (visualization of coronaries) and LV-14475; Moderate Sedation Services initial 15 minutes patient >5 years-85328    ICD 10 Codes:  I71.21-Aneurysm of the ascending aorta, without rupture    [id number] Antonio Delgado MD  Performing Physician  Electronically signed by [id number] Antonio Delgado MD on 5/24/2023 at 11:46:03 AM      cc Report to: ANTONIO DELGADO    cc Report to: [id number] Conrad Dior MD    cc Report to: [id number] Conrad Dior MD           Final   No results found for this or any previous visit from the past 3650 days.     Cardiac Scoring: No results found for this or any previous visit from the past 1825 days.    AAA : No results found for this or any previous visit from the past 1825 days.    OTHER: No results found for this or any previous visit from the past 1825 days.    LAST IMAGING RESULTS  Urgent Care Xray  Narrative: Interpreted By:  Obie Costello,   STUDY:  XR URGENT CARE XRAY      INDICATION:  Signs/Symptoms:cough.      COMPARISON:  None      ACCESSION  NUMBER(S):  [id number]      ORDERING CLINICIAN:  CHRISTINA DONG      FINDINGS:  Previous median sternotomy with tortuous and ectatic thoracic aorta.      No consolidation, effusion, edema, or pneumothorax.      Impression: No evidence of acute intrathoracic abnormality.      Signed by: Obie Costello 5/9/2024 11:36 AM  Dictation workstation:   [id number]      Problem List Items Addressed This Visit       Status post thoracic aortic aneurysm repair - Primary    Dyslipidemia    History of tobacco use    Obesity (BMI 30-39.9)    Dizziness         Marty Redd DO, FACC, FACOI

## 2024-05-31 ENCOUNTER — ANCILLARY PROCEDURE (OUTPATIENT)
Dept: CARDIOLOGY | Facility: CLINIC | Age: 59
End: 2024-05-31
Payer: COMMERCIAL

## 2024-05-31 ENCOUNTER — OFFICE VISIT (OUTPATIENT)
Dept: CARDIOLOGY | Facility: CLINIC | Age: 59
End: 2024-05-31
Payer: COMMERCIAL

## 2024-05-31 ENCOUNTER — DOCUMENTATION (OUTPATIENT)
Dept: CARDIOLOGY | Facility: CLINIC | Age: 59
End: 2024-05-31

## 2024-05-31 VITALS
DIASTOLIC BLOOD PRESSURE: 88 MMHG | HEIGHT: 65 IN | WEIGHT: 189 LBS | SYSTOLIC BLOOD PRESSURE: 120 MMHG | BODY MASS INDEX: 31.49 KG/M2 | HEART RATE: 87 BPM

## 2024-05-31 DIAGNOSIS — R42 DIZZINESS: ICD-10-CM

## 2024-05-31 DIAGNOSIS — R07.9 CHEST PAIN, UNSPECIFIED TYPE: ICD-10-CM

## 2024-05-31 DIAGNOSIS — Z98.890 STATUS POST THORACIC AORTIC ANEURYSM REPAIR: ICD-10-CM

## 2024-05-31 DIAGNOSIS — I95.1 ORTHOSTASIS: ICD-10-CM

## 2024-05-31 DIAGNOSIS — Z98.890 STATUS POST THORACIC AORTIC ANEURYSM REPAIR: Primary | ICD-10-CM

## 2024-05-31 DIAGNOSIS — Z86.79 STATUS POST THORACIC AORTIC ANEURYSM REPAIR: Primary | ICD-10-CM

## 2024-05-31 DIAGNOSIS — E66.9 OBESITY (BMI 30-39.9): ICD-10-CM

## 2024-05-31 DIAGNOSIS — E78.5 DYSLIPIDEMIA: ICD-10-CM

## 2024-05-31 DIAGNOSIS — R05.3 CHRONIC COUGH: ICD-10-CM

## 2024-05-31 DIAGNOSIS — Z86.79 STATUS POST THORACIC AORTIC ANEURYSM REPAIR: ICD-10-CM

## 2024-05-31 DIAGNOSIS — Z87.891 HISTORY OF TOBACCO USE: ICD-10-CM

## 2024-05-31 DIAGNOSIS — R00.2 PALPITATIONS: ICD-10-CM

## 2024-05-31 LAB — BODY SURFACE AREA: 1.98 M2

## 2024-05-31 PROCEDURE — 99214 OFFICE O/P EST MOD 30 MIN: CPT | Performed by: INTERNAL MEDICINE

## 2024-05-31 PROCEDURE — 3008F BODY MASS INDEX DOCD: CPT | Performed by: INTERNAL MEDICINE

## 2024-05-31 PROCEDURE — 93000 ELECTROCARDIOGRAM COMPLETE: CPT | Performed by: INTERNAL MEDICINE

## 2024-05-31 RX ORDER — ACETAMINOPHEN 325 MG/1
TABLET ORAL EVERY 6 HOURS PRN
COMMUNITY

## 2024-05-31 NOTE — PROGRESS NOTES
Patient here for follow up with Dr. Redd. 14 day monitor ordered. Monitor applied to left upper chest area using application techniques per [facility]. Patient was then thoroughly educated on how to use device and [facility]. Patient shows understanding. Patient was added to holter monitor schedule per protocol and documented in [facility] log book. Registration done on [facility] website.         MONITOR ID # : [id number]

## 2024-06-04 ENCOUNTER — LAB (OUTPATIENT)
Dept: LAB | Facility: LAB | Age: 59
End: 2024-06-04
Payer: COMMERCIAL

## 2024-06-04 DIAGNOSIS — E66.9 OBESITY (BMI 30-39.9): ICD-10-CM

## 2024-06-04 DIAGNOSIS — Z86.79 STATUS POST THORACIC AORTIC ANEURYSM REPAIR: ICD-10-CM

## 2024-06-04 DIAGNOSIS — Z98.890 STATUS POST THORACIC AORTIC ANEURYSM REPAIR: ICD-10-CM

## 2024-06-04 DIAGNOSIS — Z87.891 HISTORY OF TOBACCO USE: ICD-10-CM

## 2024-06-04 DIAGNOSIS — R05.3 CHRONIC COUGH: ICD-10-CM

## 2024-06-04 DIAGNOSIS — R07.9 CHEST PAIN, UNSPECIFIED TYPE: ICD-10-CM

## 2024-06-04 DIAGNOSIS — E78.5 DYSLIPIDEMIA: ICD-10-CM

## 2024-06-04 DIAGNOSIS — R42 DIZZINESS: ICD-10-CM

## 2024-06-04 DIAGNOSIS — I95.1 ORTHOSTASIS: ICD-10-CM

## 2024-06-04 DIAGNOSIS — R00.2 PALPITATIONS: ICD-10-CM

## 2024-06-04 LAB
ALBUMIN SERPL BCP-MCNC: 4.1 G/DL (ref 3.4–5)
ALP SERPL-CCNC: 75 U/L (ref 33–110)
ALT SERPL W P-5'-P-CCNC: 29 U/L (ref 7–45)
ANION GAP SERPL CALC-SCNC: 11 MMOL/L (ref 10–20)
AST SERPL W P-5'-P-CCNC: 16 U/L (ref 9–39)
BILIRUB SERPL-MCNC: 1.5 MG/DL (ref 0–1.2)
BUN SERPL-MCNC: 15 MG/DL (ref 6–23)
CALCIUM SERPL-MCNC: 8.6 MG/DL (ref 8.6–10.3)
CHLORIDE SERPL-SCNC: 108 MMOL/L (ref 98–107)
CHOLEST SERPL-MCNC: 152 MG/DL (ref 0–199)
CHOLESTEROL/HDL RATIO: 2.7
CO2 SERPL-SCNC: 26 MMOL/L (ref 21–32)
CREAT SERPL-MCNC: 0.49 MG/DL (ref 0.5–1.05)
EGFRCR SERPLBLD CKD-EPI 2021: >90 ML/MIN/1.73M*2
ERYTHROCYTE [DISTWIDTH] IN BLOOD BY AUTOMATED COUNT: 14.9 % (ref 11.5–14.5)
GLUCOSE SERPL-MCNC: 84 MG/DL (ref 74–99)
HCT VFR BLD AUTO: 43.4 % (ref 36–46)
HDLC SERPL-MCNC: 56.9 MG/DL
HGB BLD-MCNC: 14.3 G/DL (ref 12–16)
LDLC SERPL CALC-MCNC: 82 MG/DL
MAGNESIUM SERPL-MCNC: 1.91 MG/DL (ref 1.6–2.4)
MCH RBC QN AUTO: 29.1 PG (ref 26–34)
MCHC RBC AUTO-ENTMCNC: 32.9 G/DL (ref 32–36)
MCV RBC AUTO: 88 FL (ref 80–100)
NON HDL CHOLESTEROL: 95 MG/DL (ref 0–149)
NRBC BLD-RTO: 0 /100 WBCS (ref 0–0)
PLATELET # BLD AUTO: 240 X10*3/UL (ref 150–450)
POTASSIUM SERPL-SCNC: 4.1 MMOL/L (ref 3.5–5.3)
PROT SERPL-MCNC: 6.3 G/DL (ref 6.4–8.2)
RBC # BLD AUTO: 4.91 X10*6/UL (ref 4–5.2)
SODIUM SERPL-SCNC: 141 MMOL/L (ref 136–145)
TRIGL SERPL-MCNC: 65 MG/DL (ref 0–149)
TSH SERPL-ACNC: 1.62 MIU/L (ref 0.44–3.98)
VLDL: 13 MG/DL (ref 0–40)
WBC # BLD AUTO: 7.3 X10*3/UL (ref 4.4–11.3)

## 2024-06-04 PROCEDURE — 36415 COLL VENOUS BLD VENIPUNCTURE: CPT

## 2024-06-04 PROCEDURE — 83735 ASSAY OF MAGNESIUM: CPT

## 2024-06-04 PROCEDURE — 80053 COMPREHEN METABOLIC PANEL: CPT

## 2024-06-04 PROCEDURE — 84443 ASSAY THYROID STIM HORMONE: CPT

## 2024-06-04 PROCEDURE — 80061 LIPID PANEL: CPT

## 2024-06-04 PROCEDURE — 85027 COMPLETE CBC AUTOMATED: CPT

## 2024-06-07 ENCOUNTER — TELEPHONE (OUTPATIENT)
Dept: CARDIOLOGY | Facility: CLINIC | Age: 59
End: 2024-06-07
Payer: COMMERCIAL

## 2024-06-07 NOTE — TELEPHONE ENCOUNTER
6/7/24  1510  Patient called in for lab results.      Lab results given with patient verbalizing understanding.

## 2024-06-23 LAB — BODY SURFACE AREA: 1.98 M2

## 2024-07-23 ENCOUNTER — HOSPITAL ENCOUNTER (OUTPATIENT)
Dept: RADIOLOGY | Facility: CLINIC | Age: 59
Discharge: HOME | End: 2024-07-23
Payer: COMMERCIAL

## 2024-07-23 ENCOUNTER — TELEPHONE (OUTPATIENT)
Dept: PULMONOLOGY | Facility: HOSPITAL | Age: 59
End: 2024-07-23
Payer: COMMERCIAL

## 2024-07-23 DIAGNOSIS — Z87.891 FORMER SMOKER: ICD-10-CM

## 2024-07-23 PROCEDURE — 71271 CT THORAX LUNG CANCER SCR C-: CPT

## 2024-07-23 NOTE — TELEPHONE ENCOUNTER
Patient acknowledged understanding. All questions answered at this time.     ----- Message from Zita Che sent at 7/23/2024  1:41 PM EDT -----  Please call the patient and let her know that her CT chest shows a few small nodules and mild to moderate emphysema. We will continue with yearly screening for her.

## 2024-08-04 DIAGNOSIS — J44.9 CHRONIC OBSTRUCTIVE PULMONARY DISEASE, UNSPECIFIED COPD TYPE (MULTI): ICD-10-CM

## 2024-08-05 RX ORDER — UMECLIDINIUM BROMIDE AND VILANTEROL TRIFENATATE 62.5; 25 UG/1; UG/1
1 POWDER RESPIRATORY (INHALATION) DAILY
Qty: 60 EACH | Refills: 3 | Status: SHIPPED | OUTPATIENT
Start: 2024-08-05

## 2024-09-04 PROBLEM — R00.2 PALPITATIONS: Status: ACTIVE | Noted: 2024-09-04

## 2024-09-04 NOTE — PROGRESS NOTES
[facility]    Patient Name: Cassidy Arnold  Patient : 1965      Scribe Attestation  By signing my name below, I, Eva Dunaway Janet Messina   attest that this documentation has been prepared under the direction and in the presence of Marty Redd DO.    Scribe Attestation  By signing my name below, I, Janet Moses,   attest that this documentation has been prepared under the direction and in the presence of Marty Redd DO.       Reason for visit:  This is a 59-year-old female here for follow-up regarding thoracic ascending aortic aneurysm status post replacement of the ascending aorta with hemiarch using a 38 cm Gelweave vascular graft done in 2023, palpitations, dizziness/orthostasis, dyslipidemia, history of tobacco use, and obesity.     HPI:  This is a 59-year-old female here for follow-up regarding thoracic ascending aortic aneurysm status post replacement of the ascending aorta with hemiarch using a 38 cm Gelweave vascular graft done in 2023, palpitations, dizziness/orthostasis, dyslipidemia, history of tobacco use, and obesity.  The patient was last evaluated by me in May 2024.  At that visit I ordered blood work including CMP/lipid/magnesium/CBC/TSH, ordered a Holter monitor, and asked the patient to follow-up in 3 months and sooner if necessary.  CMP done in 2024 showed normal serum sodium and potassium with a serum creatinine of 0.49, normal ALT/AST, serum magnesium was 1.91, TSH was 1.62, CBC showed a hemoglobin of 14.3.  Lipid panel done in 2024 showed an LDL cholesterol of 82 and triglycerides of 65 while on pravastatin 20 mg daily.  CT scan of the lungs done in 2024 showed no coronary artery calcification, couple of nodules in the lungs measuring up to 4 mm, and mild to moderate upper lung predominant emphysema.  Holter monitor done in 2024 showed underlying sinus rhythm with an average heart rate of 90 bpm, rare  SVE/VE, 4 patient triggered events correlating with sinus rhythm. ECG done today showed sinus rhythm with heart rate of 78 bpm. The patient reports that he has been feeling generally well from the cardiac standpoint. She denies any new chest pain, shortness of breath, palpitations and lightheadedness. She ran out of lisinopril and has not taken it in several months. She also ran out of pravastatin. She smokes and is thinking about quitting. During my exam, she was resting comfortably on the exam table.     Assessment/Plan:   1. Thoracic ascending aortic aneurysm  The patient has a history of thoracic ascending aortic aneurysm and underwent replacement of the ascending aorta with hemiarch using a 30 cm Gelweave vascular graft in July 2023.   Intraoperative TANIA done 7/7/2023 showed normal left ventricular systolic function, normal right ventricular systolic function, no significant valve abnormalities.   CT scan of the lungs done in July 2024 showed no coronary artery calcification, couple of nodules in the lungs measuring up to 4 mm, and mild to moderate upper lung predominant emphysema.      2. Palpitations  The previously reported random, brief, intermittent palpitations had improved.  Holter monitor done in June 2024 showed underlying sinus rhythm with an average heart rate of 90 bpm, rare SVE/VE, 4 patient triggered events correlating with sinus rhythm.  ECG done today showed sinus rhythm with heart rate of 78 bpm.  Intraoperative TANIA done 7/7/2023 showed normal left ventricular systolic function, normal right ventricular systolic function, no significant valve abnormalities.   She should continue metoprolol for heart rate control.   Recent lab works as noted in the HPI.   Patient should follow general recommendations including avoiding excessive alcohol intake, avoiding excessive caffeine intake, staying well-hydrated, getting an appropriate amount of sleep.  Follow up in 6 months and sooner if necessary with  PORTER.      3. Dizziness/orthostasis  The previously reported orthostasis had improved since the last visit.  ECG done today showed sinus rhythm with heart rate of 78 bpm.  Blood pressure appears controlled on exam today at 126/78.  Will stay off of lisinopril.  Intraoperative TANIA done 7/7/2023 showed normal left ventricular systolic function, normal right ventricular systolic function, no significant valve abnormalities.   Recent lab works as noted in the HPI.   Patient should follow general recommendations including staying well-hydrated, changing the positions slowly, wearing compression stockings as necessary, and routine exercise.   Follow up in 6 months and sooner if necessary with PORTER.     4. Dyslipidemia  Lipid panel done in June 2024 showed an LDL cholesterol of 82 and triglycerides of 65 while on pravastatin 20 mg daily.  Refilled pravastatin.    Please see lifestyle recommendations below.     5 History of tobacco use   The patient is a former smoker.  I discussed with her the importance of continued smoking cessation.     6. Obesity  Please see lifestyle recommendations below.      Order:   Refilled pravastatin.  Will stay off of lisinopril.  Follow-up in 6 months with PORTER    Lifestyle Recommendations  I recommend a whole-food plant-based diet, an eating pattern that encourages the consumption of unrefined plant foods (such as fruits, vegetables, tubers, whole grains, legumes, nuts and seeds) and discourages meats, dairy products, eggs and processed foods.     The AHA/ACC recommends that the patient consume a dietary pattern that emphasizes intake of vegetables, fruits, and whole grains; includes low-fat dairy products, poultry, fish, legumes, non-tropical vegetable oils, and nuts; and limits intake of sodium, sweets, sugar-sweetened beverages, and red meats.  Adapt this dietary pattern to appropriate calorie requirements (a 500-750 kcal/day deficit to loose weight), personal and cultural food preferences,  and nutrition therapy for other medical conditions (including diabetes).  Achieve this pattern by following plans such as the [place] Mediterranean, DASH dietary pattern, or AHA diet.     Engage in 2 hours and 30 minutes per week of moderate-intensity physical activity, or 1 hour and 15 minutes (75 minutes) per week of vigorous-intensity aerobic physical activity, or an equivalent combination of moderate and vigorous-intensity aerobic physical activity. Aerobic activity should be performed in episodes of at least 10 minutes preferably spread throughout the week.     Adhering to a heart healthy diet, regular exercise habits, avoidance of tobacco products, and maintenance of a healthy weight are crucial components of their heart disease risk reduction.     Any positive review of systems not specifically addressed in the office visit today should be evaluated and treated by the patients primary care physician or in an emergency department if necessary     Patient was notified that results from ordered tests will be called to the patient if it changes current management; it will otherwise be discussed at a future appointment and available on  [employer].     Thank you for allowing me to participate in the care of this patient.        This document was generated using the assistance of voice recognition software. If there are any errors of spelling, grammar, syntax, or meaning; please feel free to contact me directly for clarification.    Past Medical History:  She has no past medical history on file.    Past Surgical History:  She has no past surgical history on file.      Social History:  She reports that she has been smoking cigarettes. She has never used smokeless tobacco. No history on file for alcohol use and drug use.    Family History:  Allergies:  Amoxicillin-pot clavulanate, Tetracyclines, Metronidazole, and Sulfa (sulfonamide antibiotics)    Outpatient Medications:  Current Outpatient Medications   Medication  "Instructions    acetaminophen (Tylenol) 325 mg tablet oral, Every 6 hours PRN    albuterol 90 mcg/actuation inhaler Every 4 hours    Anoro Ellipta 62.5-25 mcg/actuation blister with device 1 puff, inhalation, Daily    busPIRone (BUSPAR) 10 mg, oral, 2 times daily    citalopram (CELEXA) 40 mg, oral, Daily    famotidine (Pepcid) 20 mg tablet Every 24 hours    fluticasone (Flonase) 50 mcg/actuation nasal spray nasal    lisinopril 5 mg, oral, Daily    metoprolol tartrate (LOPRESSOR) 25 mg, oral, 2 times daily    montelukast (SINGULAIR) 10 mg, oral, Nightly    pravastatin (PRAVACHOL) 20 mg, oral, Every evening        ROS:  A 14 point review of systems was done and is negative other than as stated in HPI    Vitals:      3/11/2024     8:31 PM 3/14/2024    12:06 AM 3/21/2024    11:08 AM 3/29/2024     6:56 AM 5/9/2024    11:14 AM 5/14/2024     1:08 PM 5/31/2024    11:40 AM   Vitals   Systolic 134 149   137 129 120   Diastolic 80 85   72 78 88   Heart Rate 90 87   75 69 87   Temp 37 °C (98.6 °F) 36.1 °C (96.9 °F)   36.8 °C (98.2 °F) 35.5 °C (95.9 °F)    Resp 20 16   16 16    Height (in) 1.651 m (5' 5\") 1.651 m (5' 5\") 1.676 m (5' 6\") 1.651 m (5' 5\")  1.651 m (5' 5\") 1.651 m (5' 5\")   Weight (lb) 196 196 196 190  192.8 189   BMI 32.62 kg/m2 32.62 kg/m2 31.64 kg/m2 31.62 kg/m2  32.08 kg/m2 31.45 kg/m2   BSA (m2) 2.02 m2 2.02 m2 2.03 m2 1.99 m2  2 m2 1.98 m2        Physical Exam:   Constitutional: Cooperative, in no acute distress, alert, appears stated age.  Skin: Skin color, texture, turgor normal. No rashes or lesions.  Head: Normocephalic. No masses, lesions, tenderness or abnormalities  Eyes: Extraocular movements are grossly intact.  Mouth and throat: Mucous membranes moist  Neck: Neck supple, no carotid bruits, no JVD  Respiratory: Lungs clear to auscultation, no wheezing or rhonchi, no use of accessory muscles  Chest wall: Surgical scar, normal excursion with respiration  Cardiovascular: Regular rhythm without murmur, " gallop, or rubs  Gastrointestinal: Abdomen soft, nontender. Bowel sounds normal. Obese.  Musculoskeletal: Strength equal in upper extremities  Extremities: No pitting edema. Trivial scarring.  Neurologic: Sensation grossly intact, alert and oriented x3    Intake/Output:   No intake/output data recorded.    Outpatient Medications  Current Outpatient Medications on File Prior to Visit   Medication Sig Dispense Refill    acetaminophen (Tylenol) 325 mg tablet Take by mouth every 6 hours if needed.      albuterol 90 mcg/actuation inhaler every 4 hours.      Anoro Ellipta 62.5-25 mcg/actuation blister with device Inhale 1 puff by mouth once daily 60 each 3    busPIRone (Buspar) 10 mg tablet Take 1 tablet (10 mg) by mouth 2 times a day.      citalopram (CeleXA) 40 mg tablet Take 1 tablet (40 mg) by mouth once daily.      famotidine (Pepcid) 20 mg tablet once every 24 hours.      fluticasone (Flonase) 50 mcg/actuation nasal spray Administer into affected nostril(s).      lisinopril 5 mg tablet Take 1 tablet (5 mg) by mouth once daily.      metoprolol tartrate (Lopressor) 50 mg tablet Take 0.5 tablets by mouth 2 times a day. 90 tablet 1    montelukast (Singulair) 10 mg tablet Take 1 tablet (10 mg) by mouth once daily at bedtime. 30 tablet 11    pravastatin (Pravachol) 20 mg tablet Take 1 tablet (20 mg) by mouth once daily in the evening.       No current facility-administered medications on file prior to visit.       Labs: (past 26 weeks)  Recent Results (from the past 4368 hour(s))   Holter Or Event Cardiac Monitor    Collection Time: 05/31/24 12:03 PM   Result Value Ref Range    BSA 1.98 m2   Comprehensive Metabolic Panel    Collection Time: 06/04/24 11:25 AM   Result Value Ref Range    Glucose 84 74 - 99 mg/dL    Sodium 141 136 - 145 mmol/L    Potassium 4.1 3.5 - 5.3 mmol/L    Chloride 108 (H) 98 - 107 mmol/L    Bicarbonate 26 21 - 32 mmol/L    Anion Gap 11 10 - 20 mmol/L    Urea Nitrogen 15 6 - 23 mg/dL    Creatinine  0.49 (L) 0.50 - 1.05 mg/dL    eGFR >90 >60 mL/min/1.73m*2    Calcium 8.6 8.6 - 10.3 mg/dL    Albumin 4.1 3.4 - 5.0 g/dL    Alkaline Phosphatase 75 33 - 110 U/L    Total Protein 6.3 (L) 6.4 - 8.2 g/dL    AST 16 9 - 39 U/L    Bilirubin, Total 1.5 (H) 0.0 - 1.2 mg/dL    ALT 29 7 - 45 U/L   Lipid Panel    Collection Time: 06/04/24 11:25 AM   Result Value Ref Range    Cholesterol 152 0 - 199 mg/dL    HDL-Cholesterol 56.9 mg/dL    Cholesterol/HDL Ratio 2.7     LDL Calculated 82 <=99 mg/dL    VLDL 13 0 - 40 mg/dL    Triglycerides 65 0 - 149 mg/dL    Non HDL Cholesterol 95 0 - 149 mg/dL   Magnesium    Collection Time: 06/04/24 11:25 AM   Result Value Ref Range    Magnesium 1.91 1.60 - 2.40 mg/dL   CBC    Collection Time: 06/04/24 11:25 AM   Result Value Ref Range    WBC 7.3 4.4 - 11.3 x10*3/uL    nRBC 0.0 0.0 - 0.0 /100 WBCs    RBC 4.91 4.00 - 5.20 x10*6/uL    Hemoglobin 14.3 12.0 - 16.0 g/dL    Hematocrit 43.4 36.0 - 46.0 %    MCV 88 80 - 100 fL    MCH 29.1 26.0 - 34.0 pg    MCHC 32.9 32.0 - 36.0 g/dL    RDW 14.9 (H) 11.5 - 14.5 %    Platelets 240 150 - 450 x10*3/uL   TSH with reflex to Free T4 if abnormal    Collection Time: 06/04/24 11:25 AM   Result Value Ref Range    Thyroid Stimulating Hormone 1.62 0.44 - 3.98 mIU/L       ECG  Encounter Date: 05/31/24   ECG 12 Lead    Narrative    Sinus rhythm nonspecific ST and T abnormality, heart rate 87 bpm.       Echocardiogram  No results found for this or any previous visit from the past 1095 days.      CV Studies:  EKG:  Encounter Date: 05/31/24   ECG 12 Lead    Narrative    Sinus rhythm nonspecific ST and T abnormality, heart rate 87 bpm.     Echocardiogram:   Echocardiogram     Narrative  [facility]  [address] [place] [address] Street, [place] [address]  Phone [phone] Fax [phone]    TRANSTHORACIC ECHOCARDIOGRAM REPORT      Patient Name:     BLANCHE Cruz Physician:   [id number] Karel Marshall MD  Study Date:       2/4/2022       Referring Physician: [id number]  JACKIE SOCORRO  MRN/PID:          [id number]       PCP:  Accession/Order#: [id number]   Department Location: [facility]  YOB: 1965      Fellow:  Gender:           F              Nurse:  Admit Date:                      Sonographer:         Mahi Mcintosh [facility]  Admission Status: Outpatient     Additional Staff:  Height:           165.10 cm      CC Report to:  Weight:           74.84 kg       Study Type:          Echocardiogram  BSA:              1.82 m2  Blood Pressure: 122 /88 mmHg    Diagnosis/ICD: R07.9-Chest pain, unspecified; I71.2-Thoracic aortic aneurysm,  without rupture  Indication:    Chest Pain  Procedure/CPT: Echo Complete w Full [id number]    Patient History:  Pertinent History: THORACIC ANEURYSM WITHOUT RUPTURE.    Study Detail: The following Echo studies were performed: M-Mode, 2D, Doppler and  color flow.      PHYSICIAN INTERPRETATION:  Left Ventricle: The left ventricular systolic function is normal, with an estimated ejection fraction of 65-70%. There are no regional wall motion abnormalities. The left ventricular cavity size is normal. Spectral Doppler shows a normal pattern of left ventricular diastolic filling.  Left Atrium: The left atrium is normal in size.  Right Ventricle: The right ventricle is normal in size. There is normal right ventricular global systolic function.  Right Atrium: The right atrium is normal in size.  Aortic Valve: The aortic valve appears structurally normal. There is no evidence of aortic valve regurgitation. The peak instantaneous gradient of the aortic valve is 7.1 mmHg. The mean gradient of the aortic valve is 4.0 mmHg.  Mitral Valve: The mitral valve is normal in structure. There is no evidence of mitral valve regurgitation.  Tricuspid Valve: The tricuspid valve is structurally normal. There is trace tricuspid regurgitation.  Pulmonic Valve: The pulmonic valve is structurally normal. There is no indication of pulmonic valve  regurgitation.  Pericardium: There is no pericardial effusion noted.  Aorta: The aortic root is normal.      CONCLUSIONS:  1. The left ventricular systolic function is normal with a 65-70% estimated ejection fraction.    QUANTITATIVE DATA SUMMARY:  2D MEASUREMENTS:  Normal Ranges:  Ao Root d:     2.70 cm   (2.0-3.7cm)  LAs:           2.60 cm   (2.7-4.0cm)  IVSd:          1.00 cm   (0.6-1.1cm)  LVPWd:         0.80 cm   (0.6-1.1cm)  LVIDd:         4.50 cm   (3.9-5.9cm)  LVIDs:         2.65 cm  LV Mass Index: 72.9 g/m2  LV % FS        41.1 %    LA VOLUME:  Normal Ranges:  LA Vol A4C:        25.7 ml    (22+/-6mL/m2)  LA Vol A2C:        25.4 ml  LA Vol BP:         25.9 ml  LA Vol Index A4C:  14.1ml/m2  LA Vol Index A2C:  13.9 ml/m2  LA Vol Index BP:   14.2 ml/m2  LA Area A4C:       11.0 cm2  LA Area A2C:       10.8 cm2  LA Major Axis A4C: 4.0 cm  LA Major Axis A2C: 3.9 cm  LA Volume Index:   13.2 ml/m2  LA Vol A4C:        23.0 ml  LA Vol A2C:        24.0 ml    RA VOLUME BY A/L METHOD:  Normal Ranges:  RA Area A4C: 10.0 cm2    AORTA MEASUREMENTS:  Normal Ranges:  Ao Sinus, d: 2.70 cm (2.1-3.5cm)  Ao STJ, d:   2.60 cm (1.7-3.4cm)  Asc Ao, d:   4.80 cm (2.1-3.4cm)    LV SYSTOLIC FUNCTION BY 2D PLANIMETRY (MOD):  Normal Ranges:  EF-A4C View: 68.8 % (>55%)  EF-A2C View: 62.3 %  EF-Biplane:  65.8 %    LV DIASTOLIC FUNCTION:  Normal Ranges:  MV Peak E:        0.64 m/s    (0.7-1.2 m/s)  MV Peak A:        0.68 m/s    (0.42-0.7 m/s)  E/A Ratio:        0.94        (1.0-2.2)  MV e'             0.11 m/s    (>8.0)  MV lateral e'     0.13 m/s  MV medial e'      0.08 m/s  MV A Dur:         148.00 msec  E/e' Ratio:       6.07        (<8.0)  PulmV A Revs Dur: 169.00 msec    MITRAL VALVE:  Normal Ranges:  MV DT: 232 msec (150-240msec)    AORTIC VALVE:  Normal Ranges:  AoV Vmax:                1.33 m/s (<1.7m/s)  AoV Peak P.1 mmHg (<20mmHg)  AoV Mean P.0 mmHg (1.7-11.5mmHg)  LVOT Max Ruben:            0.92 m/s  (<1.1m/s)  AoV VTI:                 28.40 cm (18-25cm)  LVOT VTI:                18.40 cm  LVOT Diameter:           1.70 cm  (1.8-2.4cm)  AoV Area, VTI:           1.47 cm2 (2.5-5.5cm2)  AoV Area,Vmax:           1.56 cm2 (2.5-4.5cm2)  AoV Dimensionless Index: 0.65    RIGHT VENTRICLE:  RV 1   2.8 cm  RV 2   2.0 cm  RV 3   6.0 cm  TAPSE: 23.0 mm  RV s'  0.13 m/s    TRICUSPID VALVE/RVSP:  Normal Ranges:  Peak TR Velocity: 2.31 m/s  RV Syst Pressure: 26.3 mmHg (< 30mmHg)  TV E Vmax:        0.53 m/s  (0.3-0.7m/s)  TV A Vmax:        0.42 m/s  IVC Diam:         1.20 cm    Pulmonary Veins:  PulmV A Revs Dur: 169.00 msec      [id number] Karel Marshall MD  Electronically signed on 2/4/2022 at 11:14:19 AM        Stress Testing IMGRESULT([id number]):   NM CARDIAC STRESS REST (MYOCARDIAL PERFUSION MIBI) 02/04/2022    Narrative  MRN: [id number]  Patient Name: BLANCHE MADRIGAL    STUDY:  CARDIAC STRESS/REST INJECTION; PART 2 STRESS OR REST (NO CHARGE);  CARDIAC STRESS/REST (MYOCARDIAL PERFUSION/MIBI);  2/4/2022 9:21 am    INDICATION:  Chest Pain  R07.9: Chest pain I71.2: Thoracic aortic aneurysm without  rupture.    COMPARISON:  None.    ACCESSION NUMBER(S):  [id number]; [id number]; [id number]    ORDERING CLINICIAN:  JACKIE QUINTANILLA    TECHNIQUE:  DIVISION OF NUCLEAR MEDICINE  STRESS MYOCARDIAL PERFUSION SCAN, ONE DAY PROTOCOL    The patient received an intravenous dose of  11.6 mCi of Tc-99m  tetrofosmin and resting emission tomographic (SPECT) images of the  myocardium were acquired. The patient then exercised via treadmill  stress to  100 % of MPHR and achieved  6.0 METS. At peak stress  35.9  mCi of Tc-99m tetrofosmin were administered and stress phase SPECT  images of the myocardium were then acquired. These included ECG-gated  images to assess and quantify ventricular function.    FINDINGS:    There is a small fixed perfusion defect in the distal anterior wall  which resolves on prone imaging consistent with breast attenuation.  No reversible  perfusion defects seen.    Calculated ejection fraction of 67% without segmental wall motion  abnormality seen.    Impression  1. There is a small fixed perfusion defect in the distal anterior  wall which resolves on prone imaging consistent with breast  attenuation. There is a low probability of ischemia.    2. Calculated ejection fraction of 67% without segmental wall motion  abnormality seen.    Cardiac Catheterization:   Adult Cath     Narrative  [facility], Cath Lab, [address] [place] [address]    Cardiovascular Catheterization Report    Patient Name:     BLANCHE MADRIGAL Performing Physician:  [id number] Delgado MD  Study Date:       5/24/2023      Verifying Physician:   [id number] Delgado MD  MRN/PID:          [id number]       Cardiologist/Co-scrub:  Accession/Order#: [id number]      Fellow:                Nery Cook MD  YOB: 1965      Fellow:  Gender:           F              Referring Physician:   KYLIE DELGADO  Admit Date:                      Referring Physician:   [id number] Conrad Dior MD  Surgeon:                         Referring Physician:   [id number] Conrad Dior MD      Study: Left Heart Catheterization      Indications:  BLANCHE MADRIGAL is a 58 year old female who presents with tobacco Use - former. Surgical risk clearance low, with an asymptomatic chest pain assessment. Study performed as an elective cath procedure.    Medical History:  Stress test performed: No. CTA performed: No. Agaton accessed: No. LVEF Assessed: Yes. LVEF = 50%.    Procedure Description:  After infiltration with 2% Lidocaine, the right radial artery was cannulated with a modified Seldinger technique. Subsequently a 6 Martiniquais sheath was placed in the right radial artery. Selective coronary catheterization was performed using a 5 Fr catheter(s) exchanged over a guide wire to cannulate the coronary arteries. A JL 5 tip catheter was used for left coronary injections. A JR 5 tip catheter  was used for right coronary injections.  Multiple injections of contrast were made into the left and right coronary arteries with angiograms recorded in multiple projections. After completion of the procedure, the arterial sheath was pulled and a TR Band Radial Compression Device was utilized to obtain patent hemostasis.    Coronary Angiography:  The coronary circulation is co-dominant.    Left Main Coronary Artery:  The left main coronary artery is a normal caliber vessel. The left main arises normally from the left coronary sinus of Valsalva and bifurcates into the LAD and circumflex coronary arteries. The left main coronary artery showed no significant disease or stenosis greater than 30%.    Left Anterior Descending Coronary Artery Distribution:  The left anterior descending coronary artery is a normal caliber vessel. The LAD arises normally from the left main coronary artery. The LAD demonstrated no significant disease or stenosis greater than 30%. The 1st diagonal branch showed no significant disease or stenosis greater than 30%. The 2nd diagonal branch demonstrated no significant disease or stenosis greater than 30%.    Circumflex Coronary Artery Distribution:  The circumflex coronary artery is a large caliber vessel. The circumflex arises normally from the left main coronary artery and terminates in the AV groove. The circumflex revealed no significant disease or stenosis greater than 30%. The 1st obtuse marginal branch showed no significant disease or stenosis greater than 30%. The left posterolateral branch showed no significant disease or stenosis greater than 30%. The left posterior descending artery showed no significant disease or stenosis greater than 30%.    Ramus Intermedius:  The ramus intermedius arises normally from the left main coronary artery. The ramus intermedius showed no significant disease or stenosis greater than 30%.    Right Coronary Artery Distribution:    The right coronary artery is  a medium-sized caliber vessel. The RCA arises normally from the right sinus of Valsalva. The RCA showed no significant disease or stenosis greater than 30%. The acute marginal branch showed no significant disease or stenosis greater than 30%.  The right posterior descending artery showed no significant disease or stenosis greater than 30%.    Coronary Interventions:      Cardiac Cath Transition of Care Summary:  Post Procedure Diagnosis: No Significatn coronary artery disease.  Findings:                 Normal coronary arteries.  Blood Loss:               Estimated blood loss during the procedure was 3cc mls.  Specimens Removed:        Number of specimen(s) removed: none.    ____________________________________________________________________________________  CONCLUSIONS:  1. Angiographically unremarkable coronaries in a co-dominant system.    ____________________________________________________________________________________  CPT Codes:  Left Heart Cath (visualization of coronaries) and LV-02763; Moderate Sedation Services initial 15 minutes patient >5 years-46007    ICD 10 Codes:  I71.21-Aneurysm of the ascending aorta, without rupture    [id number] Antonio Delgado MD  Performing Physician  Electronically signed by [id number] Antonio Delgado MD on 5/24/2023 at 11:46:03 AM      cc Report to: ANTONIO DELGADO    cc Report to: [id number] Conrad Dior MD    cc Report to: [id number] Conrad Dior MD           Final   No results found for this or any previous visit from the past 3650 days.     Cardiac Scoring: No results found for this or any previous visit from the past 1825 days.    AAA : No results found for this or any previous visit from the past 1825 days.    OTHER: No results found for this or any previous visit from the past 1825 days.    LAST IMAGING RESULTS  CT lung screening low dose  Narrative: Interpreted By:  Nuno See,   STUDY:  CT LUNG SCREENING LOW DOSE; 7/23/2024 10:17 am      INDICATION:  Signs/Symptoms:former  smoker.      COMPARISON:  CT dated 07/20/2023      ACCESSION NUMBER(S):  [id number]      ORDERING CLINICIAN:  WILLIAN RUIZ      TECHNIQUE:  Helical data acquisition of the chest was obtained without IV  contrast material.  Images were reformatted in axial, coronal, and  sagittal planes.      FINDINGS:  LUNGS AND AIRWAYS:  The trachea and central airways are patent. No endobronchial lesion  is seen.      There is mild-to-moderate bilateral upper lung predominant  centrilobular and paraseptal emphysema.There is no focal  consolidation, pleural effusion, or pneumothorax.      4 mm right lower lobe nodule, image 250/339  3 mm left upper lobe nodule, image 53/339      MEDIASTINUM AND NED, LOWER NECK AND AXILLA:  Heterogeneous enlargement of the left thyroid lobe with thyroid  nodule and areas of calcification. No evidence of thoracic  lymphadenopathy by CT criteria. Esophagus appears within normal  limits as seen.      HEART AND VESSELS:  Ascending thoracic aorta is mildly dilated (3.9 - 4.2 cm). Proximal  descending thoracic aorta measuring 3.5 cm unchanged Main pulmonary  artery and its branches are normal in caliber. No coronary artery  calcification. The cardiac chambers are not enlarged.  Small hiatal hernia.      UPPER ABDOMEN:  The visualized subdiaphragmatic structures demonstrate no remarkable  findings.              CHEST WALL AND OSSEOUS STRUCTURES:  Chest wall is within normal limits.  No acute osseous pathology.There are no suspicious osseous lesions.      Impression: 1. Couple of nodules in the lungs measuring up to 4 mm as described  above. Continued screening with low-dose noncontrast chest CT in 12  months (from current date) is recommended.  2. Mild to moderate upper lung predominant emphysema.              LUNG RADS CATEGORY:  Lung Rad: Lung-RADS 2 (Benign Appearance or Indolent Behavior)      Recommendation: Continue annual screening with Low Dose Chest CT in  12 months, recommended as per American  College of Radiology  Guidelines Lung-RADS Version 2022.                  MACRO:  None      Signed by: Nuno Stoner 7/23/2024 12:48 PM  Dictation workstation:   [id number]    Problem List Items Addressed This Visit       Status post thoracic aortic aneurysm repair - Primary    Dyslipidemia    History of tobacco use    Orthostasis    Obesity (BMI 30-39.9)    Dizziness    Palpitations         Marty Redd DO, FACC, FACOI

## 2024-09-09 ENCOUNTER — APPOINTMENT (OUTPATIENT)
Dept: CARDIOLOGY | Facility: CLINIC | Age: 59
End: 2024-09-09
Payer: COMMERCIAL

## 2024-09-09 VITALS
HEART RATE: 78 BPM | WEIGHT: 189 LBS | HEIGHT: 65 IN | DIASTOLIC BLOOD PRESSURE: 78 MMHG | SYSTOLIC BLOOD PRESSURE: 126 MMHG | BODY MASS INDEX: 31.49 KG/M2

## 2024-09-09 DIAGNOSIS — E78.5 DYSLIPIDEMIA: ICD-10-CM

## 2024-09-09 DIAGNOSIS — Z86.79 STATUS POST THORACIC AORTIC ANEURYSM REPAIR: Primary | ICD-10-CM

## 2024-09-09 DIAGNOSIS — R05.3 CHRONIC COUGH: ICD-10-CM

## 2024-09-09 DIAGNOSIS — R07.9 CHEST PAIN, UNSPECIFIED TYPE: ICD-10-CM

## 2024-09-09 DIAGNOSIS — R00.2 PALPITATIONS: ICD-10-CM

## 2024-09-09 DIAGNOSIS — Z87.891 HISTORY OF TOBACCO USE: ICD-10-CM

## 2024-09-09 DIAGNOSIS — E66.9 OBESITY (BMI 30-39.9): ICD-10-CM

## 2024-09-09 DIAGNOSIS — Z98.890 STATUS POST THORACIC AORTIC ANEURYSM REPAIR: Primary | ICD-10-CM

## 2024-09-09 DIAGNOSIS — R42 DIZZINESS: ICD-10-CM

## 2024-09-09 DIAGNOSIS — I95.1 ORTHOSTASIS: ICD-10-CM

## 2024-09-09 PROCEDURE — 99214 OFFICE O/P EST MOD 30 MIN: CPT | Performed by: INTERNAL MEDICINE

## 2024-09-09 PROCEDURE — 93010 ELECTROCARDIOGRAM REPORT: CPT | Performed by: INTERNAL MEDICINE

## 2024-09-09 PROCEDURE — 93005 ELECTROCARDIOGRAM TRACING: CPT | Performed by: INTERNAL MEDICINE

## 2024-09-09 PROCEDURE — 3008F BODY MASS INDEX DOCD: CPT | Performed by: INTERNAL MEDICINE

## 2024-09-09 RX ORDER — PRAVASTATIN SODIUM 20 MG/1
20 TABLET ORAL EVERY EVENING
Qty: 90 TABLET | Refills: 3 | Status: SHIPPED | OUTPATIENT
Start: 2024-09-09

## 2024-09-17 ENCOUNTER — APPOINTMENT (OUTPATIENT)
Dept: CARDIAC SURGERY | Facility: CLINIC | Age: 59
End: 2024-09-17
Payer: COMMERCIAL

## 2024-09-18 DIAGNOSIS — Z86.79 STATUS POST THORACIC AORTIC ANEURYSM REPAIR: Primary | ICD-10-CM

## 2024-09-18 DIAGNOSIS — Z98.890 STATUS POST THORACIC AORTIC ANEURYSM REPAIR: Primary | ICD-10-CM

## 2024-09-19 DIAGNOSIS — Z98.890 STATUS POST THORACIC AORTIC ANEURYSM REPAIR: ICD-10-CM

## 2024-09-19 DIAGNOSIS — Z87.891 HISTORY OF TOBACCO USE: ICD-10-CM

## 2024-09-19 DIAGNOSIS — Z86.79 STATUS POST THORACIC AORTIC ANEURYSM REPAIR: ICD-10-CM

## 2024-09-19 DIAGNOSIS — R05.3 CHRONIC COUGH: ICD-10-CM

## 2024-09-19 DIAGNOSIS — R42 DIZZINESS: ICD-10-CM

## 2024-09-19 DIAGNOSIS — I95.1 ORTHOSTASIS: ICD-10-CM

## 2024-09-19 DIAGNOSIS — R07.9 CHEST PAIN, UNSPECIFIED TYPE: ICD-10-CM

## 2024-09-19 DIAGNOSIS — E78.5 DYSLIPIDEMIA: ICD-10-CM

## 2024-09-20 RX ORDER — METOPROLOL TARTRATE 50 MG/1
TABLET ORAL 2 TIMES DAILY
Qty: 90 TABLET | Refills: 1 | Status: SHIPPED | OUTPATIENT
Start: 2024-09-20

## 2024-09-26 DIAGNOSIS — F17.210 CIGARETTE SMOKER: Primary | ICD-10-CM

## 2024-09-27 ENCOUNTER — TELEPHONE (OUTPATIENT)
Dept: PULMONOLOGY | Facility: HOSPITAL | Age: 59
End: 2024-09-27
Payer: COMMERCIAL

## 2024-09-27 RX ORDER — VARENICLINE TARTRATE 0.5 (11)-1
KIT ORAL
Qty: 66 MG | Refills: 0 | Status: SHIPPED | OUTPATIENT
Start: 2024-09-27 | End: 2024-11-02

## 2024-09-27 NOTE — TELEPHONE ENCOUNTER
Patient acknowledged understanding. All questions answered at this time.     ----- Message from Zita Che sent at 9/27/2024 10:06 AM EDT -----  I sent this for her.  ----- Message -----  From: Kallie Beth RN  Sent: 9/26/2024   2:48 PM EDT  To: HIEN Allan-CNP    Patient is struggling with her smoking again and would like to try varenicline again.

## 2024-10-01 ENCOUNTER — LAB (OUTPATIENT)
Dept: LAB | Facility: LAB | Age: 59
End: 2024-10-01
Payer: COMMERCIAL

## 2024-10-01 DIAGNOSIS — Z98.890 STATUS POST THORACIC AORTIC ANEURYSM REPAIR: ICD-10-CM

## 2024-10-01 DIAGNOSIS — Z86.79 STATUS POST THORACIC AORTIC ANEURYSM REPAIR: ICD-10-CM

## 2024-10-01 LAB
CREAT SERPL-MCNC: 0.52 MG/DL (ref 0.5–1.05)
EGFRCR SERPLBLD CKD-EPI 2021: >90 ML/MIN/1.73M*2

## 2024-10-01 PROCEDURE — 36415 COLL VENOUS BLD VENIPUNCTURE: CPT

## 2024-10-01 PROCEDURE — 82565 ASSAY OF CREATININE: CPT

## 2024-10-03 ENCOUNTER — HOSPITAL ENCOUNTER (OUTPATIENT)
Dept: RADIOLOGY | Facility: CLINIC | Age: 59
End: 2024-10-03
Payer: COMMERCIAL

## 2024-10-17 ENCOUNTER — APPOINTMENT (OUTPATIENT)
Dept: RADIOLOGY | Facility: HOSPITAL | Age: 59
End: 2024-10-17
Payer: COMMERCIAL

## 2024-10-31 ENCOUNTER — HOSPITAL ENCOUNTER (OUTPATIENT)
Dept: RADIOLOGY | Facility: HOSPITAL | Age: 59
Discharge: HOME | End: 2024-10-31
Payer: COMMERCIAL

## 2024-10-31 DIAGNOSIS — Z98.890 STATUS POST THORACIC AORTIC ANEURYSM REPAIR: ICD-10-CM

## 2024-10-31 DIAGNOSIS — Z86.79 STATUS POST THORACIC AORTIC ANEURYSM REPAIR: ICD-10-CM

## 2024-10-31 PROCEDURE — 71275 CT ANGIOGRAPHY CHEST: CPT

## 2024-10-31 PROCEDURE — 2550000001 HC RX 255 CONTRASTS

## 2024-11-12 ENCOUNTER — OFFICE VISIT (OUTPATIENT)
Dept: PULMONOLOGY | Facility: HOSPITAL | Age: 59
End: 2024-11-12
Payer: COMMERCIAL

## 2024-11-12 VITALS
RESPIRATION RATE: 16 BRPM | OXYGEN SATURATION: 96 % | DIASTOLIC BLOOD PRESSURE: 71 MMHG | BODY MASS INDEX: 31.74 KG/M2 | HEART RATE: 77 BPM | WEIGHT: 185.9 LBS | HEIGHT: 64 IN | SYSTOLIC BLOOD PRESSURE: 128 MMHG

## 2024-11-12 DIAGNOSIS — F17.210 CIGARETTE SMOKER: ICD-10-CM

## 2024-11-12 DIAGNOSIS — J30.9 ALLERGIC RHINITIS, UNSPECIFIED SEASONALITY, UNSPECIFIED TRIGGER: ICD-10-CM

## 2024-11-12 DIAGNOSIS — J44.9 COPD, MILD (MULTI): Primary | ICD-10-CM

## 2024-11-12 DIAGNOSIS — Z87.891 FORMER SMOKER: ICD-10-CM

## 2024-11-12 PROCEDURE — 99213 OFFICE O/P EST LOW 20 MIN: CPT | Performed by: NURSE PRACTITIONER

## 2024-11-12 PROCEDURE — 3008F BODY MASS INDEX DOCD: CPT | Performed by: NURSE PRACTITIONER

## 2024-11-12 RX ORDER — PREDNISONE 10 MG/1
TABLET ORAL
Qty: 40 TABLET | Refills: 0 | Status: SHIPPED | OUTPATIENT
Start: 2024-11-12 | End: 2024-11-28

## 2024-11-12 RX ORDER — IBUPROFEN 200 MG
1 TABLET ORAL EVERY 24 HOURS
Qty: 30 PATCH | Refills: 0 | Status: SHIPPED | OUTPATIENT
Start: 2024-11-12

## 2024-11-12 RX ORDER — FLUTICASONE PROPIONATE 50 MCG
1 SPRAY, SUSPENSION (ML) NASAL DAILY
Qty: 16 G | Refills: 11 | Status: SHIPPED | OUTPATIENT
Start: 2024-11-12

## 2024-11-12 ASSESSMENT — ENCOUNTER SYMPTOMS
WHEEZING: 1
FATIGUE: 0
RHINORRHEA: 0
SHORTNESS OF BREATH: 1
COUGH: 1
UNEXPECTED WEIGHT CHANGE: 0
CHILLS: 0
FEVER: 0

## 2024-11-12 NOTE — PATIENT INSTRUCTIONS
Please take prednisone taper until gone.   Continue on Anoro one puff daily.   Continue albuterol as needed.   Continue Zyrtec, Flonase, and Singulair.  I sent nicotine patches for you.  Call with any questions or concerns.  Follow up with me in 6 months.

## 2024-11-12 NOTE — PROGRESS NOTES
Subjective   Patient ID: Cassidy Arnold is a 59 y.o. female who presents for follow up    HPI: Patient has PMH of nicotine dependence, GERD, CAD, aortic aneurysm, anxiety, and depression. She was referred for chronic cough. She states that she has been having a daily cough, which is mostly dry but will bring up some phlegm. She hears herself wheezing as well. She does have sinus drainage and congestion, she was recently started on Montelukast and Cetirizine, which has helped her symptoms. She has not been started on any inhalers yet. She denies any fever, chills, chest pain, leg swelling, or hemoptysis. She is a current smoker at 1 ppd and has smoked for the past 25 years. She currently works in the [employer] at [employer], she does work with cleaning supplies. She denies growing up on a farm or having barn animals.      She states that she was able to successfully quit smoking on October 31st, 2022. She states breathing is stable on Anoro. She is not needing to use albuterol often. She had her aortic aneurysm surgery in July and doing well.    Today she is here for follow up. She states that she started with a flare of her COPD a few weeks ago that has been getting better but she is still having increased shortness of breath and wheezing. She feels tightness also. She is smoking anywhere from 1/2-1 ppd currently and wants to try nicotine patches. She is motivated to quit. She has no other concerns.     Review of Systems   Constitutional:  Negative for chills, fatigue, fever and unexpected weight change.   HENT:  Positive for congestion. Negative for postnasal drip and rhinorrhea.    Respiratory:  Positive for cough (denies hemoptysis.), shortness of breath and wheezing.    Cardiovascular:  Negative for chest pain and leg swelling.   All other systems reviewed and are negative.      Objective   Physical Exam  Vitals reviewed.   Constitutional:       Appearance: Normal appearance.   HENT:      Head: Normocephalic.    Cardiovascular:      Rate and Rhythm: Normal rate and regular rhythm.   Pulmonary:      Effort: Pulmonary effort is normal.      Breath sounds: Wheezing (faint) present.   Skin:     General: Skin is warm and dry.   Neurological:      Mental Status: She is alert.       Assessment/Plan   1. COPD  2. Nicotine dependence  3. Allergic rhinitis  4. GERD     -PFTs with FEV1/FVC 80, FEV1 76-80 discussed results and consider mild COPD, AAT normal. Continue Anoro one puff once a day, she has noticed significant benefit from this, and albuterol prn. Prednisone taper today she is still having increased shortness of breath and wheezing.     -She is back to smoking at 1/2-1 ppd, prior she smoked 1 ppd and has smoked for the past 25 years. I reviewed CT from January and she does have centrilobular emphysema. She had most recent CT post surgery done July 20th, no suspicious nodules, she will qualify for LDCT July 2024. This was done and was stable, she had a CT chest for AAA surveillance in October which as also stable. Will continue annual screening.      -IGE 49, RAST negative. Continue Loratadine, Flonase, and Singulair.      -GERD symptoms are currently controlled with omeprazole.      Overall we will continue current regimen and get a prednisone taper. I sent her nicotine patches today. I will see her back in 6 months. I instructed patient to call sooner if needed.      Total time:  25 min.

## 2024-11-20 ENCOUNTER — APPOINTMENT (OUTPATIENT)
Dept: CARDIAC SURGERY | Facility: HOSPITAL | Age: 59
End: 2024-11-20
Payer: COMMERCIAL

## 2024-11-20 DIAGNOSIS — Z98.890 STATUS POST THORACIC AORTIC ANEURYSM REPAIR: Primary | ICD-10-CM

## 2024-11-20 DIAGNOSIS — Z86.79 STATUS POST THORACIC AORTIC ANEURYSM REPAIR: Primary | ICD-10-CM

## 2024-11-20 PROCEDURE — 99215 OFFICE O/P EST HI 40 MIN: CPT

## 2024-11-20 ASSESSMENT — ENCOUNTER SYMPTOMS
ORTHOPNEA: 0
SLEEP DISTURBANCES DUE TO BREATHING: 0
NEAR-SYNCOPE: 0
CHILLS: 0
FEVER: 0
DECREASED APPETITE: 0
WHEEZING: 0
IRREGULAR HEARTBEAT: 0

## 2024-11-20 NOTE — PROGRESS NOTES
Subjective   Cassidy Arnold is a 59 y.o. female.    Chief Complaint:  Follow-up (Aortic clinic.)    A telemedicine visit (audio and video) between the patient (at the originating site) and the provider (at the distant site) was utilized to provide this telehealth service. ~ Verbal consent was requested and obtained from Cassidy Arnold on this date  11/20/2024 08:300 AM , for a telehealth visit.      HPI  Cassidy Arnold is a 59-year-old female who underwent a median sternotomy replacement of a ascending aorta with hemiarch using 30 mm Gelweave vascular graft with Dr. Dior on July 7, 2023.  She presents today for annual surveillance visit  She states that she is doing well from a cardiac standpoint but is currently on a tapered steroid Dosepak for shortness of breath and allergies.  Denies chest pain, dizziness, back pain, lower extremity edema, and syncopal episodes. A CT scan was performed on October 31, 2024 that will be reviewed and discussed.      Review of Systems   Constitutional: Negative for chills, decreased appetite and fever.   Cardiovascular:  Negative for irregular heartbeat, near-syncope and orthopnea.   Respiratory:  Negative for sleep disturbances due to breathing and wheezing.        Objective   Constitutional:       Appearance: Healthy appearance.   Psychiatric:         Attention and Perception: Attention normal.         Mood and Affect: Mood normal.         Speech: Speech normal.         Behavior: Behavior normal.         Thought Content: Thought content normal.         Cognition and Memory: Cognition normal.         Judgment: Judgment normal.       Assessment/Plan   In Summary, Cassidy is doing well with regards to her AAA repair.  I personally reviewed and we discussed her recent CT scan and the fact that  everything looks stable right now.  I have reiterated the importance of quitting smoking and making sure that her blood pressure stays in a normal range.  We will plan a CT scan to be done in 1 year  and a follow-up visit, once CT is complete.    Plan:  Order entered for CT to be done in 1 year  Office visit will be scheduled for 1 year.  Continue regular follow-up with cardiology and PCP.  Call with concerns, issues, and/or questions.    Time: 40 minutes

## 2024-12-07 DIAGNOSIS — J44.9 CHRONIC OBSTRUCTIVE PULMONARY DISEASE, UNSPECIFIED COPD TYPE (MULTI): ICD-10-CM

## 2024-12-10 RX ORDER — UMECLIDINIUM BROMIDE AND VILANTEROL TRIFENATATE 62.5; 25 UG/1; UG/1
1 POWDER RESPIRATORY (INHALATION) DAILY
Qty: 60 EACH | Refills: 3 | Status: SHIPPED | OUTPATIENT
Start: 2024-12-10

## 2024-12-17 ENCOUNTER — HOSPITAL ENCOUNTER (OUTPATIENT)
Dept: RADIOLOGY | Facility: HOSPITAL | Age: 59
Discharge: HOME | End: 2024-12-17
Payer: COMMERCIAL

## 2024-12-17 DIAGNOSIS — M54.9 DORSALGIA, UNSPECIFIED: ICD-10-CM

## 2024-12-17 PROCEDURE — 74176 CT ABD & PELVIS W/O CONTRAST: CPT | Performed by: RADIOLOGY

## 2024-12-17 PROCEDURE — 74176 CT ABD & PELVIS W/O CONTRAST: CPT

## 2025-01-03 ENCOUNTER — OFFICE VISIT (OUTPATIENT)
Dept: UROLOGY | Facility: CLINIC | Age: 60
End: 2025-01-03
Payer: COMMERCIAL

## 2025-01-03 VITALS
SYSTOLIC BLOOD PRESSURE: 118 MMHG | DIASTOLIC BLOOD PRESSURE: 75 MMHG | BODY MASS INDEX: 30.82 KG/M2 | WEIGHT: 185 LBS | HEIGHT: 65 IN | HEART RATE: 78 BPM

## 2025-01-03 DIAGNOSIS — R39.9 URINARY SYMPTOM OR SIGN: ICD-10-CM

## 2025-01-03 DIAGNOSIS — N20.1 URETERAL STONE: Primary | ICD-10-CM

## 2025-01-03 LAB
POC APPEARANCE, URINE: CLEAR
POC BILIRUBIN, URINE: NEGATIVE
POC BLOOD, URINE: ABNORMAL
POC COLOR, URINE: YELLOW
POC GLUCOSE, URINE: NEGATIVE MG/DL
POC KETONES, URINE: NEGATIVE MG/DL
POC LEUKOCYTES, URINE: NEGATIVE
POC NITRITE,URINE: NEGATIVE
POC PH, URINE: 7 PH
POC PROTEIN, URINE: NEGATIVE MG/DL
POC SPECIFIC GRAVITY, URINE: 1.02
POC UROBILINOGEN, URINE: 0.2 EU/DL

## 2025-01-03 PROCEDURE — 81003 URINALYSIS AUTO W/O SCOPE: CPT | Performed by: UROLOGY

## 2025-01-03 PROCEDURE — 99204 OFFICE O/P NEW MOD 45 MIN: CPT | Performed by: UROLOGY

## 2025-01-03 PROCEDURE — 3008F BODY MASS INDEX DOCD: CPT | Performed by: UROLOGY

## 2025-01-03 NOTE — PROGRESS NOTES
01/03/2025  59-year-old female presents intermittent left flank pain over 2 months, still 4/10 pain    CT scan demonstrated a 6 mm left distal ureteral stone, right kidney stone 4 mm    I talked to patient about options for the kidney stones, #1 watchful waiting, #2 surgical intervention, cystoscopy ureteroscopy holmium laser lithotripsy and stent insertion. All questions were answered, patient expressed understanding and agreed to the plan.    Impression  Left ureteral stone  Left flank pain  Right kidney stone    Plan:  Cystoscopy ureteroscopy holmium laser lithotripsy and left stent insertion;      Chief Complaint   Patient presents with    OBSTRUCTED STONE     Pt here for obstructed stone ref by access point   Pt states she has lower back pain         Physical Exam     TODAYS LAB RESULTS:    Contains abnormal data POCT UA Automated manually resulted  Order: [id number]   Status: Final result       Visible to patient: Yes (not seen)       Next appt: 03/12/2025 at 02:30 PM in Cardiology (Marty Redd, DO)       Dx: Urinary symptom or sign    0 Result Notes      Component  Ref Range & Units 10:37   POC Color, Urine  Straw, Yellow, Light-Yellow Yellow   POC Appearance, Urine  Clear Clear   POC Glucose, Urine  NEGATIVE mg/dl NEGATIVE   POC Bilirubin, Urine  NEGATIVE NEGATIVE   POC Ketones, Urine  NEGATIVE mg/dl NEGATIVE   POC Specific Gravity, Urine  1.005 - 1.035 1.020   POC Blood, Urine  NEGATIVE LARGE (3+) Abnormal    POC PH, Urine  No Reference Range Established PH 7.0   POC Protein, Urine  NEGATIVE mg/dl NEGATIVE   POC Urobilinogen, Urine  0.2, 1.0 EU/DL 0.2   Poc Nitrite, Urine  NEGATIVE NEGATIVE   POC Leukocytes, Urine  NEGATIVE NEGATIVE        ASSESSMENT&PLAN:      IMPRESSIONS:      IMPRESSION:  1.  0.6 cm obstructing renal calculus within the distal left ureter  without significant left-sided hydroureteronephrosis.  2. 0.4 cm nonobstructing renal calculus within the right inferior  renal pole. No right-sided  hydroureteronephrosis.  3. Unchanged small hiatal hernia.  4. Remaining chronic and incidental findings as described above.      MACRO:  Critical Finding:  See findings. Notification was initiated on  12/18/2024 at 5:18 pm by Dr. Israel Murrell.  (**-YCF-**)  Instructions:      Signed by: Israel Murrell 12/18/2024 5:18 PM  Dictation workstation:   [id number]

## 2025-01-09 ENCOUNTER — ANESTHESIA EVENT (OUTPATIENT)
Dept: OPERATING ROOM | Facility: HOSPITAL | Age: 60
End: 2025-01-09
Payer: COMMERCIAL

## 2025-01-09 ENCOUNTER — PRE-ADMISSION TESTING (OUTPATIENT)
Dept: PREADMISSION TESTING | Facility: HOSPITAL | Age: 60
End: 2025-01-09
Payer: COMMERCIAL

## 2025-01-09 VITALS
TEMPERATURE: 96.6 F | OXYGEN SATURATION: 98 % | WEIGHT: 183 LBS | SYSTOLIC BLOOD PRESSURE: 116 MMHG | HEIGHT: 65 IN | RESPIRATION RATE: 20 BRPM | HEART RATE: 77 BPM | BODY MASS INDEX: 30.49 KG/M2 | DIASTOLIC BLOOD PRESSURE: 74 MMHG

## 2025-01-09 DIAGNOSIS — Z01.818 PRE-OP EVALUATION: Primary | ICD-10-CM

## 2025-01-09 DIAGNOSIS — N20.1 URETERAL STONE: ICD-10-CM

## 2025-01-09 DIAGNOSIS — I10 HTN (HYPERTENSION), BENIGN: ICD-10-CM

## 2025-01-09 DIAGNOSIS — Z87.891 HISTORY OF TOBACCO USE: ICD-10-CM

## 2025-01-09 LAB
ANION GAP SERPL CALC-SCNC: 8 MMOL/L (ref 10–20)
BUN SERPL-MCNC: 13 MG/DL (ref 6–23)
CALCIUM SERPL-MCNC: 8.7 MG/DL (ref 8.6–10.3)
CHLORIDE SERPL-SCNC: 106 MMOL/L (ref 98–107)
CO2 SERPL-SCNC: 27 MMOL/L (ref 21–32)
CREAT SERPL-MCNC: 0.53 MG/DL (ref 0.5–1.05)
EGFRCR SERPLBLD CKD-EPI 2021: >90 ML/MIN/1.73M*2
ERYTHROCYTE [DISTWIDTH] IN BLOOD BY AUTOMATED COUNT: 13.6 % (ref 11.5–14.5)
GLUCOSE SERPL-MCNC: 99 MG/DL (ref 74–99)
HCT VFR BLD AUTO: 46.3 % (ref 36–46)
HGB BLD-MCNC: 14.9 G/DL (ref 12–16)
MCH RBC QN AUTO: 29.3 PG (ref 26–34)
MCHC RBC AUTO-ENTMCNC: 32.2 G/DL (ref 32–36)
MCV RBC AUTO: 91 FL (ref 80–100)
NRBC BLD-RTO: 0 /100 WBCS (ref 0–0)
PLATELET # BLD AUTO: 240 X10*3/UL (ref 150–450)
POTASSIUM SERPL-SCNC: 4 MMOL/L (ref 3.5–5.3)
RBC # BLD AUTO: 5.08 X10*6/UL (ref 4–5.2)
SODIUM SERPL-SCNC: 137 MMOL/L (ref 136–145)
WBC # BLD AUTO: 7.7 X10*3/UL (ref 4.4–11.3)

## 2025-01-09 PROCEDURE — 82374 ASSAY BLOOD CARBON DIOXIDE: CPT

## 2025-01-09 PROCEDURE — 85027 COMPLETE CBC AUTOMATED: CPT

## 2025-01-09 PROCEDURE — 36415 COLL VENOUS BLD VENIPUNCTURE: CPT

## 2025-01-09 ASSESSMENT — ENCOUNTER SYMPTOMS
PSYCHIATRIC NEGATIVE: 1
ENDOCRINE NEGATIVE: 1
CONSTITUTIONAL NEGATIVE: 1
RESPIRATORY NEGATIVE: 1
CARDIOVASCULAR NEGATIVE: 1
EYES NEGATIVE: 1
ABDOMINAL PAIN: 1
HEMATOLOGIC/LYMPHATIC NEGATIVE: 1
ALLERGIC/IMMUNOLOGIC NEGATIVE: 1
MUSCULOSKELETAL NEGATIVE: 1
NEUROLOGICAL NEGATIVE: 1

## 2025-01-09 ASSESSMENT — LIFESTYLE VARIABLES: SMOKING_STATUS: SMOKER

## 2025-01-09 NOTE — H&P (VIEW-ONLY)
History Of Present Illness  Cassidy Arnold is a very pleasant 59 y.o. female presenting with left flank/abdominopelvic pain and left ureter stone. Scheduled for cystoscopy, pyelogram, with left ureteroscopy and holmium laser lithotripsy with left stent insertion under general anesthesia per Dr. Zhang on 25.      Past Medical History  Past Medical History:   Diagnosis Date    Anxiety     Arthritis     HL (hearing loss)     Kidney stones     PONV (postoperative nausea and vomiting)     Shortness of breath        Surgical History  Past Surgical History:   Procedure Laterality Date    APPENDECTOMY       SECTION, CLASSIC      THORACIC AORTIC ANEURYSM REPAIR          Social History  She reports that she has been smoking cigarettes. She has never used smokeless tobacco. She reports that she does not currently use alcohol. She reports that she does not use drugs.    Family History  Family History   Problem Relation Name Age of Onset    Heart failure Mother      Other (pulmonary htn) Mother      Pancreatic cancer Father      Nephrolithiasis Sister      Diabetes Brother          Allergies  Amoxicillin-pot clavulanate, Tetracyclines, Metronidazole, and Sulfa (sulfonamide antibiotics)    Review of Systems   Constitutional: Negative.    HENT: Negative.     Eyes: Negative.    Respiratory: Negative.     Cardiovascular: Negative.    Gastrointestinal:  Positive for abdominal pain.   Endocrine: Negative.    Genitourinary:         Left flank pain  + ureter stone    Musculoskeletal: Negative.    Skin: Negative.    Allergic/Immunologic: Negative.    Neurological: Negative.    Hematological: Negative.    Psychiatric/Behavioral: Negative.     All other systems reviewed and are negative.       Physical Exam  Vitals and nursing note reviewed.   Constitutional:       Appearance: Normal appearance.   HENT:      Head: Normocephalic.      Nose: Nose normal.      Mouth/Throat:      Comments:   Mallampati: 3  TMD: >3  Finger breadth:  "3  Dentition:  WNL  Neck ROM: full   Eyes:      Pupils: Pupils are equal, round, and reactive to light.   Cardiovascular:      Rate and Rhythm: Normal rate and regular rhythm.      Heart sounds: Normal heart sounds, S1 normal and S2 normal.   Pulmonary:      Effort: Pulmonary effort is normal.      Breath sounds: Normal breath sounds.      Comments: Lungs diminished throughout.   + smoker   1/2 ppd currently.   Abdominal:      General: Bowel sounds are normal.      Palpations: Abdomen is soft.      Comments: Bowel sounds active x4 quads    Musculoskeletal:         General: Normal range of motion.      Cervical back: Normal range of motion.      Right lower leg: No edema.      Left lower leg: No edema.   Skin:     General: Skin is warm and dry.   Neurological:      General: No focal deficit present.      Mental Status: She is alert and oriented to person, place, and time.   Psychiatric:         Mood and Affect: Mood normal.         Behavior: Behavior normal.         Thought Content: Thought content normal.         Judgment: Judgment normal.          Last Recorded Vitals  Blood pressure 116/74, pulse 77, temperature 35.9 °C (96.6 °F), temperature source Oral, resp. rate 20, height 1.651 m (5' 5\"), weight 83 kg (183 lb), SpO2 98%.    Visit Vitals  /74   Pulse 77   Temp 35.9 °C (96.6 °F) (Oral)   Resp 20   Ht 1.651 m (5' 5\")   Wt 83 kg (183 lb)   SpO2 98%   BMI 30.45 kg/m²   OB Status Postmenopausal   Smoking Status Every Day   BSA 1.95 m²       DASI Risk Score      Flowsheet Row Questionnaire Series Submission from 1/4/2025 in [facility] with Generic Provider Molly   Can you take care of yourself (eat, dress, bathe, or use toilet)?  2.75  filed at 01/04/2025 2110   Can you walk indoors, such as around your house? 1.75  filed at 01/04/2025 2110   Can you walk a block or two on level ground?  2.75  filed at 01/04/2025 2110   Can you climb a flight of stairs or walk up a hill? 5.5  filed at 01/04/2025 2110   Can " you run a short distance? 0  filed at 01/04/2025 2110   Can you do light work around the house like dusting or washing dishes? 2.7  filed at 01/04/2025 2110   Can you do moderate work around the house like vacuuming, sweeping floors or carrying groceries? 3.5  filed at 01/04/2025 2110   Can you do heavy work around the house like scrubbing floors or lifting and moving heavy furniture?  8  filed at 01/04/2025 2110   Can you do yard work like raking leaves, weeding or pushing a mower? 4.5  filed at 01/04/2025 2110   Can you have sexual relations? 5.25  filed at 01/04/2025 2110   Can you participate in moderate recreational activities like golf, bowling, dancing, doubles tennis or throwing a baseball or football? 6  filed at 01/04/2025 2110   Can you participate in strenous sports like swimming, singles tennis, football, basketball, or skiing? 7.5  filed at 01/04/2025 2110   DASI SCORE 50.2  filed at 01/04/2025 2110   METS Score (Will be calculated only when all the questions are answered) 8.9  filed at 01/04/2025 2110          Caprini DVT Assessment      Flowsheet Row Pre-Admission Testing from 1/9/2025 in  [facility]   DVT Score (IF A SCORE IS NOT CALCULATING, MUST SELECT A BMI TO COMPLETE) 3 filed at 01/09/2025 1205   Surgical Factors Minor surgery planned filed at 01/09/2025 1205   BMI (BMI MUST BE CHOSEN) 30 or less filed at 01/09/2025 1205          Modified Frailty Index    No data to display       CHADS2 Stroke Risk  Current as of 27 minutes ago        N/A 3 to 100%: High Risk   2 to < 3%: Medium Risk   0 to < 2%: Low Risk     Last Change: N/A          This score determines the patient's risk of having a stroke if the patient has atrial fibrillation.        This score is not applicable to this patient. Components are not calculated.          Revised Cardiac Risk Index      Flowsheet Row Pre-Admission Testing from 1/9/2025 in  [facility]   High-Risk Surgery (Intraperitoneal,  Intrathoracic,Suprainguinal vascular) 0 filed at 01/09/2025 1206   History of ischemic heart disease (History of MI, History of positive exercuse test, Current chest paint considered due to myocardial ischemia, Use of nitrate therapy, ECG with pathological Q Waves) 0 filed at 01/09/2025 1206   History of congestive heart failure (pulmonary edemia, bilateral rales or S3 gallop, Paroxysmal nocturnal dyspnea, CXR showing pulmonary vascular redistribution) 0 filed at 01/09/2025 1206   History of cerebrovascular disease (Prior TIA or stroke) 0 filed at 01/09/2025 1206   Pre-operative insulin treatment 0 filed at 01/09/2025 1206   Pre-operative creatinine>2 mg/dl 0 filed at 01/09/2025 1206   Revised Cardiac Risk Calculator 0 filed at 01/09/2025 1206          Apfel Simplified Score      Flowsheet Row Pre-Admission Testing from 1/9/2025 in  [facility]   Smoking status 0 filed at 01/09/2025 1206   History of motion sickness or PONV  0 filed at 01/09/2025 1206   Use of postoperative opioids 0 filed at 01/09/2025 1206   Gender - Female 1=Yes filed at 01/09/2025 1206   Apfel Simplified Score Calculator 1 filed at 01/09/2025 1206          Risk Analysis Index Results This Encounter    No data found in the last 10 encounters.       Stop Bang Score      Flowsheet Row Pre-Admission Testing from 1/9/2025 in  [facility] Questionnaire Series Submission from 1/4/2025 in [facility] with Generic Provider Molly   Do you snore loudly? 0 filed at 01/09/2025 1040 0  filed at 01/04/2025 2110   Do you often feel tired or fatigued after your sleep? 0 filed at 01/09/2025 1040 0  filed at 01/04/2025 2110   Has anyone ever observed you stop breathing in your sleep? 0 filed at 01/09/2025 1040 0  filed at 01/04/2025 2110   Do you have or are you being treated for high blood pressure? 1 filed at 01/09/2025 1040 0  filed at 01/04/2025 2110   Recent BMI (Calculated) 30.8 filed at 01/09/2025 1040 30.8  filed at  01/04/2025 2110   Is BMI greater than 35 kg/m2? 0=No filed at 01/09/2025 1040 0=No  filed at 01/04/2025 2110   Age older than 50 years old? 1=Yes filed at 01/09/2025 1040 1=Yes  filed at 01/04/2025 2110   Is your neck circumference greater than 17 inches (Male) or 16 inches (Female)? 0 filed at 01/09/2025 1040 --   Gender - Male 0=No filed at 01/09/2025 1040 0=No  filed at 01/04/2025 2110   STOP-BANG Total Score 2 filed at 01/09/2025 1040 --          Prodigy: High Risk  Total Score: 0          ARISCAT Score for Postoperative Pulmonary Complications    No data to display       Marshall Perioperative Risk for Myocardial Infarction or Cardiac Arrest (ZUHAIR)    No data to display       Relevant Results  Results for orders placed or performed in visit on 01/09/25 (from the past 24 hours)   Basic Metabolic Panel   Result Value Ref Range    Glucose 99 74 - 99 mg/dL    Sodium 137 136 - 145 mmol/L    Potassium 4.0 3.5 - 5.3 mmol/L    Chloride 106 98 - 107 mmol/L    Bicarbonate 27 21 - 32 mmol/L    Anion Gap 8 (L) 10 - 20 mmol/L    Urea Nitrogen 13 6 - 23 mg/dL    Creatinine 0.53 0.50 - 1.05 mg/dL    eGFR >90 >60 mL/min/1.73m*2    Calcium 8.7 8.6 - 10.3 mg/dL   CBC   Result Value Ref Range    WBC 7.7 4.4 - 11.3 x10*3/uL    nRBC 0.0 0.0 - 0.0 /100 WBCs    RBC 5.08 4.00 - 5.20 x10*6/uL    Hemoglobin 14.9 12.0 - 16.0 g/dL    Hematocrit 46.3 (H) 36.0 - 46.0 %    MCV 91 80 - 100 fL    MCH 29.3 26.0 - 34.0 pg    MCHC 32.2 32.0 - 36.0 g/dL    RDW 13.6 11.5 - 14.5 %    Platelets 240 150 - 450 x10*3/uL               Assessment/Plan   Problem List Items Addressed This Visit       History of tobacco use    Ureteral stone    Relevant Orders    Basic Metabolic Panel (Completed)    CBC (Completed)     Other Visit Diagnoses       Pre-op evaluation    -  Primary    Relevant Orders    Basic Metabolic Panel (Completed)    CBC (Completed)    HTN (hypertension), benign        Relevant Orders    Basic Metabolic Panel (Completed)    CBC (Completed)           Scheduled for cystoscopy, pyelogram, with left ureteroscopy and holmium laser lithotripsy with left stent insertion under general anesthesia per Dr. Zhang on 1/14/25.   CBC, BMP ordered. Reviewed and these are WNL and acceptable for upcoming surgery.   EKG from 9/2024 shows SR, possible anterior infarct.   H&P and airway assessment completed today.  Please no smoking morning of surgery.   OK to continue Tylenol.   Ok to use your inhalers at any time in the surgery process.   Please take your metoprolol the morning of surgery with a small sip of water.   All surgery instructions reviewed with patient by RN. Verbalized understanding.          Maria Alejandra Khan, HIEN-CNS

## 2025-01-09 NOTE — PREPROCEDURE INSTRUCTIONS
Medication List            Accurate as of January 9, 2025 10:41 AM. Always use your most recent med list.                acetaminophen 325 mg tablet  Commonly known as: Tylenol     albuterol 90 mcg/actuation inhaler     Anoro Ellipta 62.5-25 mcg/actuation blister with device  Generic drug: umeclidinium-vilanteroL  Inhale 1 puff by mouth once daily     busPIRone 10 mg tablet  Commonly known as: Buspar     citalopram 40 mg tablet  Commonly known as: CeleXA     famotidine 20 mg tablet  Commonly known as: Pepcid     fluticasone 50 mcg/actuation nasal spray  Commonly known as: Flonase  Administer 1 spray into each nostril once daily.     metoprolol tartrate 50 mg tablet  Commonly known as: Lopressor  Take 1/2 (one-half) tablet by mouth twice daily     montelukast 10 mg tablet  Commonly known as: Singulair  Take 1 tablet (10 mg) by mouth once daily at bedtime.     nicotine 21 mg/24 hr patch  Commonly known as: Nicoderm CQ  Place 1 patch over 24 hours on the skin once every 24 hours.     pravastatin 20 mg tablet  Commonly known as: Pravachol  Take 1 tablet (20 mg) by mouth once daily in the evening.                              NPO Instructions:    Nothing to eat or drink after midnight  On 1/13 you will receive a call with your arrival time and anesthesia recommendations of 12 ounces water 2 hours before arrival time  No smoking morning of surgery  Morning of surgery take your metoprolol and use your anoro ellipta   Hydrate well the day before surgery        Additional Instructions:     Shower morning of surgery deodorant only  Dress comfortably day of surgery  No jewelry day of surgery  Hydrate well after surgery  Call with any questions [phone]    Deep Breathing Exercises after surgery:   Breathe deeply using your lungs as fully as possible to move   secretions and clear lungs more easily.  Do a cycle of five deep breaths every hour.      Start by placing your hands on your ribs and take a deep breath in through  your nose, expanding your lower chest.  You should feel your ribs push against your hands.  Breathe out slowly through your mouth until all the air is gone.    For every other breath, hold your breath for three seconds.  This will help keep the lungs fully open.     Coughing : Coughing is necessary to clear secretions that may accumulate in your lungs.  This should be done after breathing exercises.    If lying down, bend your knees and support you incision with a pillow or your hands to make it more comfortable.    If sitting, support your incision, lean forward and keep your feet on the floor.  After your five deep breaths, breathe in and cough out sharply.  Repeat this cycle twice or for as long as you have secretions to clear.  ( You will not put your incision at risk by coughing.)    Leg Exercises:  Leg exercises are important to maintain good blood circulation in your legs, maintain muscle strength and prevent joint stiffness.  Do each exercise for 5 repetitions every hour while awake for the first few days or until you are up and walking around.         A. Pump your feet up and down at the ankles        B. With your legs straight, make circles with your feet.        C. Bend and straighten your knees by sliding your heels up and down the bed.         D. With your legs straight tighten the muscle above your knee and push the back of your knee down             Into the bed.  Hold for five seconds and then relax.  Alternate legs.

## 2025-01-09 NOTE — H&P
History Of Present Illness  Cassidy Arnold is a very pleasant 59 y.o. female presenting with left flank/abdominopelvic pain and left ureter stone. Scheduled for cystoscopy, pyelogram, with left ureteroscopy and holmium laser lithotripsy with left stent insertion under general anesthesia per Dr. Zhang on 25.      Past Medical History  Past Medical History:   Diagnosis Date    Anxiety     Arthritis     HL (hearing loss)     Kidney stones     PONV (postoperative nausea and vomiting)     Shortness of breath        Surgical History  Past Surgical History:   Procedure Laterality Date    APPENDECTOMY       SECTION, CLASSIC      THORACIC AORTIC ANEURYSM REPAIR          Social History  She reports that she has been smoking cigarettes. She has never used smokeless tobacco. She reports that she does not currently use alcohol. She reports that she does not use drugs.    Family History  Family History   Problem Relation Name Age of Onset    Heart failure Mother      Other (pulmonary htn) Mother      Pancreatic cancer Father      Nephrolithiasis Sister      Diabetes Brother          Allergies  Amoxicillin-pot clavulanate, Tetracyclines, Metronidazole, and Sulfa (sulfonamide antibiotics)    Review of Systems   Constitutional: Negative.    HENT: Negative.     Eyes: Negative.    Respiratory: Negative.     Cardiovascular: Negative.    Gastrointestinal:  Positive for abdominal pain.   Endocrine: Negative.    Genitourinary:         Left flank pain  + ureter stone    Musculoskeletal: Negative.    Skin: Negative.    Allergic/Immunologic: Negative.    Neurological: Negative.    Hematological: Negative.    Psychiatric/Behavioral: Negative.     All other systems reviewed and are negative.       Physical Exam  Vitals and nursing note reviewed.   Constitutional:       Appearance: Normal appearance.   HENT:      Head: Normocephalic.      Nose: Nose normal.      Mouth/Throat:      Comments:   Mallampati: 3  TMD: >3  Finger breadth:  "3  Dentition:  WNL  Neck ROM: full   Eyes:      Pupils: Pupils are equal, round, and reactive to light.   Cardiovascular:      Rate and Rhythm: Normal rate and regular rhythm.      Heart sounds: Normal heart sounds, S1 normal and S2 normal.   Pulmonary:      Effort: Pulmonary effort is normal.      Breath sounds: Normal breath sounds.      Comments: Lungs diminished throughout.   + smoker   1/2 ppd currently.   Abdominal:      General: Bowel sounds are normal.      Palpations: Abdomen is soft.      Comments: Bowel sounds active x4 quads    Musculoskeletal:         General: Normal range of motion.      Cervical back: Normal range of motion.      Right lower leg: No edema.      Left lower leg: No edema.   Skin:     General: Skin is warm and dry.   Neurological:      General: No focal deficit present.      Mental Status: She is alert and oriented to person, place, and time.   Psychiatric:         Mood and Affect: Mood normal.         Behavior: Behavior normal.         Thought Content: Thought content normal.         Judgment: Judgment normal.          Last Recorded Vitals  Blood pressure 116/74, pulse 77, temperature 35.9 °C (96.6 °F), temperature source Oral, resp. rate 20, height 1.651 m (5' 5\"), weight 83 kg (183 lb), SpO2 98%.    Visit Vitals  /74   Pulse 77   Temp 35.9 °C (96.6 °F) (Oral)   Resp 20   Ht 1.651 m (5' 5\")   Wt 83 kg (183 lb)   SpO2 98%   BMI 30.45 kg/m²   OB Status Postmenopausal   Smoking Status Every Day   BSA 1.95 m²       DASI Risk Score      Flowsheet Row Questionnaire Series Submission from 1/4/2025 in [facility] Care with Generic Provider Molly   Can you take care of yourself (eat, dress, bathe, or use toilet)?  2.75  filed at 01/04/2025 2110   Can you walk indoors, such as around your house? 1.75  filed at 01/04/2025 2110   Can you walk a block or two on level ground?  2.75  filed at 01/04/2025 2110   Can you climb a flight of stairs or walk up a hill? 5.5  filed at 01/04/2025 2110   Can " you run a short distance? 0  filed at 01/04/2025 2110   Can you do light work around the house like dusting or washing dishes? 2.7  filed at 01/04/2025 2110   Can you do moderate work around the house like vacuuming, sweeping floors or carrying groceries? 3.5  filed at 01/04/2025 2110   Can you do heavy work around the house like scrubbing floors or lifting and moving heavy furniture?  8  filed at 01/04/2025 2110   Can you do yard work like raking leaves, weeding or pushing a mower? 4.5  filed at 01/04/2025 2110   Can you have sexual relations? 5.25  filed at 01/04/2025 2110   Can you participate in moderate recreational activities like golf, bowling, dancing, doubles tennis or throwing a baseball or football? 6  filed at 01/04/2025 2110   Can you participate in strenous sports like swimming, singles tennis, football, basketball, or skiing? 7.5  filed at 01/04/2025 2110   DASI SCORE 50.2  filed at 01/04/2025 2110   METS Score (Will be calculated only when all the questions are answered) 8.9  filed at 01/04/2025 2110          Caprini DVT Assessment      Flowsheet Row Pre-Admission Testing from 1/9/2025 in  [facility]   DVT Score (IF A SCORE IS NOT CALCULATING, MUST SELECT A BMI TO COMPLETE) 3 filed at 01/09/2025 1205   Surgical Factors Minor surgery planned filed at 01/09/2025 1205   BMI (BMI MUST BE CHOSEN) 30 or less filed at 01/09/2025 1205          Modified Frailty Index    No data to display       CHADS2 Stroke Risk  Current as of 27 minutes ago        N/A 3 to 100%: High Risk   2 to < 3%: Medium Risk   0 to < 2%: Low Risk     Last Change: N/A          This score determines the patient's risk of having a stroke if the patient has atrial fibrillation.        This score is not applicable to this patient. Components are not calculated.          Revised Cardiac Risk Index      Flowsheet Row Pre-Admission Testing from 1/9/2025 in  [facility]   High-Risk Surgery (Intraperitoneal,  Intrathoracic,Suprainguinal vascular) 0 filed at 01/09/2025 1206   History of ischemic heart disease (History of MI, History of positive exercuse test, Current chest paint considered due to myocardial ischemia, Use of nitrate therapy, ECG with pathological Q Waves) 0 filed at 01/09/2025 1206   History of congestive heart failure (pulmonary edemia, bilateral rales or S3 gallop, Paroxysmal nocturnal dyspnea, CXR showing pulmonary vascular redistribution) 0 filed at 01/09/2025 1206   History of cerebrovascular disease (Prior TIA or stroke) 0 filed at 01/09/2025 1206   Pre-operative insulin treatment 0 filed at 01/09/2025 1206   Pre-operative creatinine>2 mg/dl 0 filed at 01/09/2025 1206   Revised Cardiac Risk Calculator 0 filed at 01/09/2025 1206          Apfel Simplified Score      Flowsheet Row Pre-Admission Testing from 1/9/2025 in  [facility]   Smoking status 0 filed at 01/09/2025 1206   History of motion sickness or PONV  0 filed at 01/09/2025 1206   Use of postoperative opioids 0 filed at 01/09/2025 1206   Gender - Female 1=Yes filed at 01/09/2025 1206   Apfel Simplified Score Calculator 1 filed at 01/09/2025 1206          Risk Analysis Index Results This Encounter    No data found in the last 10 encounters.       Stop Bang Score      Flowsheet Row Pre-Admission Testing from 1/9/2025 in  [facility] Questionnaire Series Submission from 1/4/2025 in [facility] Care with Generic Provider Molly   Do you snore loudly? 0 filed at 01/09/2025 1040 0  filed at 01/04/2025 2110   Do you often feel tired or fatigued after your sleep? 0 filed at 01/09/2025 1040 0  filed at 01/04/2025 2110   Has anyone ever observed you stop breathing in your sleep? 0 filed at 01/09/2025 1040 0  filed at 01/04/2025 2110   Do you have or are you being treated for high blood pressure? 1 filed at 01/09/2025 1040 0  filed at 01/04/2025 2110   Recent BMI (Calculated) 30.8 filed at 01/09/2025 1040 30.8  filed at  01/04/2025 2110   Is BMI greater than 35 kg/m2? 0=No filed at 01/09/2025 1040 0=No  filed at 01/04/2025 2110   Age older than 50 years old? 1=Yes filed at 01/09/2025 1040 1=Yes  filed at 01/04/2025 2110   Is your neck circumference greater than 17 inches (Male) or 16 inches (Female)? 0 filed at 01/09/2025 1040 --   Gender - Male 0=No filed at 01/09/2025 1040 0=No  filed at 01/04/2025 2110   STOP-BANG Total Score 2 filed at 01/09/2025 1040 --          Prodigy: High Risk  Total Score: 0          ARISCAT Score for Postoperative Pulmonary Complications    No data to display       Marshall Perioperative Risk for Myocardial Infarction or Cardiac Arrest (ZUHAIR)    No data to display       Relevant Results  Results for orders placed or performed in visit on 01/09/25 (from the past 24 hours)   Basic Metabolic Panel   Result Value Ref Range    Glucose 99 74 - 99 mg/dL    Sodium 137 136 - 145 mmol/L    Potassium 4.0 3.5 - 5.3 mmol/L    Chloride 106 98 - 107 mmol/L    Bicarbonate 27 21 - 32 mmol/L    Anion Gap 8 (L) 10 - 20 mmol/L    Urea Nitrogen 13 6 - 23 mg/dL    Creatinine 0.53 0.50 - 1.05 mg/dL    eGFR >90 >60 mL/min/1.73m*2    Calcium 8.7 8.6 - 10.3 mg/dL   CBC   Result Value Ref Range    WBC 7.7 4.4 - 11.3 x10*3/uL    nRBC 0.0 0.0 - 0.0 /100 WBCs    RBC 5.08 4.00 - 5.20 x10*6/uL    Hemoglobin 14.9 12.0 - 16.0 g/dL    Hematocrit 46.3 (H) 36.0 - 46.0 %    MCV 91 80 - 100 fL    MCH 29.3 26.0 - 34.0 pg    MCHC 32.2 32.0 - 36.0 g/dL    RDW 13.6 11.5 - 14.5 %    Platelets 240 150 - 450 x10*3/uL               Assessment/Plan   Problem List Items Addressed This Visit       History of tobacco use    Ureteral stone    Relevant Orders    Basic Metabolic Panel (Completed)    CBC (Completed)     Other Visit Diagnoses       Pre-op evaluation    -  Primary    Relevant Orders    Basic Metabolic Panel (Completed)    CBC (Completed)    HTN (hypertension), benign        Relevant Orders    Basic Metabolic Panel (Completed)    CBC (Completed)           Scheduled for cystoscopy, pyelogram, with left ureteroscopy and holmium laser lithotripsy with left stent insertion under general anesthesia per Dr. Zhang on 1/14/25.   CBC, BMP ordered. Reviewed and these are WNL and acceptable for upcoming surgery.   EKG from 9/2024 shows SR, possible anterior infarct.   H&P and airway assessment completed today.  Please no smoking morning of surgery.   OK to continue Tylenol.   Ok to use your inhalers at any time in the surgery process.   Please take your metoprolol the morning of surgery with a small sip of water.   All surgery instructions reviewed with patient by RN. Verbalized understanding.          Maria Alejandra Khan, HIEN-CNS

## 2025-01-14 ENCOUNTER — HOSPITAL ENCOUNTER (OUTPATIENT)
Facility: HOSPITAL | Age: 60
Setting detail: OUTPATIENT SURGERY
Discharge: HOME | End: 2025-01-14
Attending: UROLOGY | Admitting: UROLOGY
Payer: COMMERCIAL

## 2025-01-14 ENCOUNTER — APPOINTMENT (OUTPATIENT)
Dept: RADIOLOGY | Facility: HOSPITAL | Age: 60
End: 2025-01-14
Payer: COMMERCIAL

## 2025-01-14 ENCOUNTER — PHARMACY VISIT (OUTPATIENT)
Dept: PHARMACY | Facility: CLINIC | Age: 60
End: 2025-01-14
Payer: MEDICARE

## 2025-01-14 ENCOUNTER — ANESTHESIA (OUTPATIENT)
Dept: OPERATING ROOM | Facility: HOSPITAL | Age: 60
End: 2025-01-14
Payer: COMMERCIAL

## 2025-01-14 VITALS
TEMPERATURE: 98 F | RESPIRATION RATE: 10 BRPM | BODY MASS INDEX: 30.82 KG/M2 | SYSTOLIC BLOOD PRESSURE: 117 MMHG | WEIGHT: 185 LBS | HEART RATE: 72 BPM | HEIGHT: 65 IN | DIASTOLIC BLOOD PRESSURE: 73 MMHG | OXYGEN SATURATION: 94 %

## 2025-01-14 DIAGNOSIS — N20.1 URETERAL STONE: ICD-10-CM

## 2025-01-14 PROCEDURE — 74420 UROGRAPHY RTRGR +-KUB: CPT | Performed by: UROLOGY

## 2025-01-14 PROCEDURE — 3600000008 HC OR TIME - EACH INCREMENTAL 1 MINUTE - PROCEDURE LEVEL THREE: Performed by: UROLOGY

## 2025-01-14 PROCEDURE — 3600000003 HC OR TIME - INITIAL BASE CHARGE - PROCEDURE LEVEL THREE: Performed by: UROLOGY

## 2025-01-14 PROCEDURE — 2500000004 HC RX 250 GENERAL PHARMACY W/ HCPCS (ALT 636 FOR OP/ED): Performed by: NURSE ANESTHETIST, CERTIFIED REGISTERED

## 2025-01-14 PROCEDURE — 7100000010 HC PHASE TWO TIME - EACH INCREMENTAL 1 MINUTE: Performed by: UROLOGY

## 2025-01-14 PROCEDURE — 2500000004 HC RX 250 GENERAL PHARMACY W/ HCPCS (ALT 636 FOR OP/ED): Mod: JZ | Performed by: UROLOGY

## 2025-01-14 PROCEDURE — 2500000004 HC RX 250 GENERAL PHARMACY W/ HCPCS (ALT 636 FOR OP/ED): Performed by: ANESTHESIOLOGY

## 2025-01-14 PROCEDURE — 7100000002 HC RECOVERY ROOM TIME - EACH INCREMENTAL 1 MINUTE: Performed by: UROLOGY

## 2025-01-14 PROCEDURE — C1769 GUIDE WIRE: HCPCS | Performed by: UROLOGY

## 2025-01-14 PROCEDURE — 76000 FLUOROSCOPY <1 HR PHYS/QHP: CPT

## 2025-01-14 PROCEDURE — 82365 CALCULUS SPECTROSCOPY: CPT | Performed by: UROLOGY

## 2025-01-14 PROCEDURE — 2720000007 HC OR 272 NO HCPCS: Performed by: UROLOGY

## 2025-01-14 PROCEDURE — RXMED WILLOW AMBULATORY MEDICATION CHARGE

## 2025-01-14 PROCEDURE — 2550000001 HC RX 255 CONTRASTS: Performed by: UROLOGY

## 2025-01-14 PROCEDURE — 3700000002 HC GENERAL ANESTHESIA TIME - EACH INCREMENTAL 1 MINUTE: Performed by: UROLOGY

## 2025-01-14 PROCEDURE — 96372 THER/PROPH/DIAG INJ SC/IM: CPT | Performed by: NURSE ANESTHETIST, CERTIFIED REGISTERED

## 2025-01-14 PROCEDURE — 7100000009 HC PHASE TWO TIME - INITIAL BASE CHARGE: Performed by: UROLOGY

## 2025-01-14 PROCEDURE — 2500000005 HC RX 250 GENERAL PHARMACY W/O HCPCS: Performed by: UROLOGY

## 2025-01-14 PROCEDURE — 3700000001 HC GENERAL ANESTHESIA TIME - INITIAL BASE CHARGE: Performed by: UROLOGY

## 2025-01-14 PROCEDURE — 52356 CYSTO/URETERO W/LITHOTRIPSY: CPT | Performed by: UROLOGY

## 2025-01-14 PROCEDURE — 7100000001 HC RECOVERY ROOM TIME - INITIAL BASE CHARGE: Performed by: UROLOGY

## 2025-01-14 RX ORDER — KETOROLAC TROMETHAMINE 30 MG/ML
INJECTION, SOLUTION INTRAMUSCULAR; INTRAVENOUS AS NEEDED
Status: DISCONTINUED | OUTPATIENT
Start: 2025-01-14 | End: 2025-01-14

## 2025-01-14 RX ORDER — MORPHINE SULFATE 2 MG/ML
2 INJECTION, SOLUTION INTRAMUSCULAR; INTRAVENOUS EVERY 5 MIN PRN
Status: DISCONTINUED | OUTPATIENT
Start: 2025-01-14 | End: 2025-01-14 | Stop reason: HOSPADM

## 2025-01-14 RX ORDER — FAMOTIDINE 10 MG/ML
20 INJECTION INTRAVENOUS ONCE
Status: COMPLETED | OUTPATIENT
Start: 2025-01-14 | End: 2025-01-14

## 2025-01-14 RX ORDER — DIPHENHYDRAMINE HYDROCHLORIDE 50 MG/ML
12.5 INJECTION INTRAMUSCULAR; INTRAVENOUS ONCE AS NEEDED
Status: DISCONTINUED | OUTPATIENT
Start: 2025-01-14 | End: 2025-01-14 | Stop reason: HOSPADM

## 2025-01-14 RX ORDER — SODIUM CHLORIDE 0.9 G/100ML
IRRIGANT IRRIGATION AS NEEDED
Status: DISCONTINUED | OUTPATIENT
Start: 2025-01-14 | End: 2025-01-14 | Stop reason: HOSPADM

## 2025-01-14 RX ORDER — ALBUTEROL SULFATE 0.83 MG/ML
2.5 SOLUTION RESPIRATORY (INHALATION) ONCE AS NEEDED
Status: DISCONTINUED | OUTPATIENT
Start: 2025-01-14 | End: 2025-01-14 | Stop reason: HOSPADM

## 2025-01-14 RX ORDER — CLINDAMYCIN PHOSPHATE 900 MG/50ML
900 INJECTION, SOLUTION INTRAVENOUS ONCE
Status: COMPLETED | OUTPATIENT
Start: 2025-01-14 | End: 2025-01-14

## 2025-01-14 RX ORDER — MIDAZOLAM HYDROCHLORIDE 1 MG/ML
INJECTION, SOLUTION INTRAMUSCULAR; INTRAVENOUS AS NEEDED
Status: DISCONTINUED | OUTPATIENT
Start: 2025-01-14 | End: 2025-01-14

## 2025-01-14 RX ORDER — ONDANSETRON HYDROCHLORIDE 2 MG/ML
INJECTION, SOLUTION INTRAVENOUS AS NEEDED
Status: DISCONTINUED | OUTPATIENT
Start: 2025-01-14 | End: 2025-01-14

## 2025-01-14 RX ORDER — DROPERIDOL 2.5 MG/ML
0.62 INJECTION, SOLUTION INTRAMUSCULAR; INTRAVENOUS ONCE AS NEEDED
Status: DISCONTINUED | OUTPATIENT
Start: 2025-01-14 | End: 2025-01-14 | Stop reason: HOSPADM

## 2025-01-14 RX ORDER — LIDOCAINE HCL/PF 100 MG/5ML
SYRINGE (ML) INTRAVENOUS AS NEEDED
Status: DISCONTINUED | OUTPATIENT
Start: 2025-01-14 | End: 2025-01-14

## 2025-01-14 RX ORDER — LIDOCAINE HYDROCHLORIDE 10 MG/ML
0.1 INJECTION, SOLUTION EPIDURAL; INFILTRATION; INTRACAUDAL; PERINEURAL ONCE
Status: DISCONTINUED | OUTPATIENT
Start: 2025-01-14 | End: 2025-01-14 | Stop reason: HOSPADM

## 2025-01-14 RX ORDER — GLYCOPYRROLATE 0.2 MG/ML
INJECTION INTRAMUSCULAR; INTRAVENOUS AS NEEDED
Status: DISCONTINUED | OUTPATIENT
Start: 2025-01-14 | End: 2025-01-14

## 2025-01-14 RX ORDER — HYDROCODONE BITARTRATE AND ACETAMINOPHEN 5; 325 MG/1; MG/1
1 TABLET ORAL EVERY 6 HOURS PRN
Qty: 20 TABLET | Refills: 0 | Status: SHIPPED | OUTPATIENT
Start: 2025-01-14

## 2025-01-14 RX ORDER — FENTANYL CITRATE 50 UG/ML
INJECTION, SOLUTION INTRAMUSCULAR; INTRAVENOUS AS NEEDED
Status: DISCONTINUED | OUTPATIENT
Start: 2025-01-14 | End: 2025-01-14

## 2025-01-14 RX ORDER — LABETALOL HYDROCHLORIDE 5 MG/ML
5 INJECTION, SOLUTION INTRAVENOUS ONCE AS NEEDED
Status: DISCONTINUED | OUTPATIENT
Start: 2025-01-14 | End: 2025-01-14 | Stop reason: HOSPADM

## 2025-01-14 RX ORDER — HYDRALAZINE HYDROCHLORIDE 20 MG/ML
5 INJECTION INTRAMUSCULAR; INTRAVENOUS EVERY 30 MIN PRN
Status: DISCONTINUED | OUTPATIENT
Start: 2025-01-14 | End: 2025-01-14 | Stop reason: HOSPADM

## 2025-01-14 RX ORDER — SODIUM CHLORIDE, SODIUM LACTATE, POTASSIUM CHLORIDE, CALCIUM CHLORIDE 600; 310; 30; 20 MG/100ML; MG/100ML; MG/100ML; MG/100ML
100 INJECTION, SOLUTION INTRAVENOUS CONTINUOUS
Status: DISCONTINUED | OUTPATIENT
Start: 2025-01-14 | End: 2025-01-14 | Stop reason: HOSPADM

## 2025-01-14 RX ORDER — MEPERIDINE HYDROCHLORIDE 25 MG/ML
12.5 INJECTION INTRAMUSCULAR; INTRAVENOUS; SUBCUTANEOUS EVERY 10 MIN PRN
Status: DISCONTINUED | OUTPATIENT
Start: 2025-01-14 | End: 2025-01-14 | Stop reason: HOSPADM

## 2025-01-14 RX ORDER — PHENYLEPHRINE HCL IN 0.9% NACL 1 MG/10 ML
SYRINGE (ML) INTRAVENOUS AS NEEDED
Status: DISCONTINUED | OUTPATIENT
Start: 2025-01-14 | End: 2025-01-14

## 2025-01-14 RX ORDER — CIPROFLOXACIN 500 MG/1
500 TABLET ORAL 2 TIMES DAILY
Qty: 14 TABLET | Refills: 0 | Status: SHIPPED | OUTPATIENT
Start: 2025-01-14 | End: 2025-01-21

## 2025-01-14 RX ORDER — ONDANSETRON HYDROCHLORIDE 2 MG/ML
4 INJECTION, SOLUTION INTRAVENOUS ONCE AS NEEDED
Status: DISCONTINUED | OUTPATIENT
Start: 2025-01-14 | End: 2025-01-14 | Stop reason: HOSPADM

## 2025-01-14 RX ORDER — PROPOFOL 10 MG/ML
INJECTION, EMULSION INTRAVENOUS AS NEEDED
Status: DISCONTINUED | OUTPATIENT
Start: 2025-01-14 | End: 2025-01-14

## 2025-01-14 RX ORDER — OXYCODONE AND ACETAMINOPHEN 5; 325 MG/1; MG/1
1 TABLET ORAL EVERY 4 HOURS PRN
Status: DISCONTINUED | OUTPATIENT
Start: 2025-01-14 | End: 2025-01-14 | Stop reason: HOSPADM

## 2025-01-14 RX ADMIN — Medication 100 MCG: at 13:56

## 2025-01-14 RX ADMIN — Medication 100 MCG: at 13:52

## 2025-01-14 RX ADMIN — ONDANSETRON 4 MG: 2 INJECTION INTRAMUSCULAR; INTRAVENOUS at 13:48

## 2025-01-14 RX ADMIN — CLINDAMYCIN PHOSPHATE 900 MG: 900 INJECTION, SOLUTION INTRAVENOUS at 13:41

## 2025-01-14 RX ADMIN — Medication 100 MCG: at 13:59

## 2025-01-14 RX ADMIN — GLYCOPYRROLATE 0.2 MG: 0.2 INJECTION INTRAMUSCULAR; INTRAVENOUS at 13:59

## 2025-01-14 RX ADMIN — MORPHINE SULFATE 2 MG: 2 INJECTION, SOLUTION INTRAMUSCULAR; INTRAVENOUS at 14:59

## 2025-01-14 RX ADMIN — DEXAMETHASONE SODIUM PHOSPHATE 4 MG: 4 INJECTION INTRA-ARTICULAR; INTRALESIONAL; INTRAMUSCULAR; INTRAVENOUS; SOFT TISSUE at 13:48

## 2025-01-14 RX ADMIN — FAMOTIDINE 20 MG: 10 INJECTION, SOLUTION INTRAVENOUS at 12:39

## 2025-01-14 RX ADMIN — FENTANYL CITRATE 100 MCG: 50 INJECTION INTRAMUSCULAR; INTRAVENOUS at 13:48

## 2025-01-14 RX ADMIN — SODIUM CHLORIDE: 9 INJECTION, SOLUTION INTRAVENOUS at 14:05

## 2025-01-14 RX ADMIN — LIDOCAINE HYDROCHLORIDE 60 MG: 20 INJECTION, SOLUTION INTRAVENOUS at 13:48

## 2025-01-14 RX ADMIN — KETOROLAC TROMETHAMINE 30 MG: 30 INJECTION, SOLUTION INTRAMUSCULAR at 14:10

## 2025-01-14 RX ADMIN — SODIUM CHLORIDE: 9 INJECTION, SOLUTION INTRAVENOUS at 13:41

## 2025-01-14 RX ADMIN — MIDAZOLAM 2 MG: 1 INJECTION INTRAMUSCULAR; INTRAVENOUS at 13:48

## 2025-01-14 RX ADMIN — MORPHINE SULFATE 2 MG: 2 INJECTION, SOLUTION INTRAMUSCULAR; INTRAVENOUS at 14:36

## 2025-01-14 RX ADMIN — PROPOFOL 200 MG: 10 INJECTION, EMULSION INTRAVENOUS at 13:48

## 2025-01-14 RX ADMIN — Medication 100 MCG: at 14:04

## 2025-01-14 SDOH — HEALTH STABILITY: MENTAL HEALTH: CURRENT SMOKER: 1

## 2025-01-14 ASSESSMENT — PAIN SCALES - GENERAL
PAINLEVEL_OUTOF10: 6
PAINLEVEL_OUTOF10: 3
PAINLEVEL_OUTOF10: 0 - NO PAIN
PAINLEVEL_OUTOF10: 6
PAINLEVEL_OUTOF10: 3
PAIN_LEVEL: 5

## 2025-01-14 ASSESSMENT — PAIN DESCRIPTION - LOCATION
LOCATION: PELVIS
LOCATION: PELVIS

## 2025-01-14 ASSESSMENT — PAIN - FUNCTIONAL ASSESSMENT
PAIN_FUNCTIONAL_ASSESSMENT: 0-10
PAIN_FUNCTIONAL_ASSESSMENT: 0-10

## 2025-01-14 NOTE — OP NOTE
cystoscopy pyelogram left ureteroscopy holmium laser lithotripsy , BASKET RETRIVAL( c-arm) (L) Operative Note     Date: 2025  OR Location: [facility]    Name: Cassidy Arnold, : 1965, Age: 59 y.o., MRN: [id number], Sex: female    Diagnosis  Pre-op Diagnosis      * Ureteral stone [N20.1] Post-op Diagnosis     * Ureteral stone [N20.1]     Procedures  Cystoscopy, left retrograde Polygram, ureteroscopy, laser lithotripsy and basket stone fragments    Surgeons      * Jonathan Zhang - Primary    Resident/Fellow/Other Assistant:  Surgeons and Role:  * No surgeons found with a matching role *    Staff:   Circulator: Kirsten Velasquezub Person: Javier Jiménez Person:   [place]: Cassie    Anesthesia Staff: CRNA: ELIZABETH Hicks    Procedure Summary  Anesthesia: General  ASA: II  Estimated Blood Loss: 0mL  Intra-op Medications: Administrations occurring from 1417 to 1547 on 25:  * No intraprocedure medications in log *        Specimen:   ID Type Source Tests Collected by Time   A :  Calculus Urine, Clean Catch CALCULI (STONE) ANALYSIS Jonathan Zhang MD 2025 1408                 Drains and/or Catheters: * None in log *    Tourniquet Times:         Implants:     Findings: 6 mm left distal ureteral stone    Indications: Cassidy Arnold is an 59 y.o. female who is having surgery for Ureteral stone [N20.1].  Ureteral stone    The patient was seen in the preoperative area. The risks, benefits, complications, treatment options, non-operative alternatives, expected recovery and outcomes were discussed with the patient. The possibilities of reaction to medication, pulmonary aspiration, injury to surrounding structures, bleeding, recurrent infection, the need for additional procedures, failure to diagnose a condition, and creating a complication requiring transfusion or operation were discussed with the patient. The patient concurred with the proposed plan, giving informed consent.  The site of surgery was properly  noted/marked if necessary per policy. The patient has been actively warmed in preoperative area. Preoperative antibiotics have been ordered and given within 1 hours of incision. Venous thrombosis prophylaxis have been ordered including bilateral sequential compression devices    Procedure Details: Patient was identified in the preoperative holding area and brought into the room, placed on supine position. After a general anesthesia was induced, patient was repositioned in a dorsal lithotomy position, genitalia area was prepped and draped in a routine standard fashion. A cystoscopy was performed with a 22F Olympus cystoscope, and the findings include normal urethra, normal bladder, no tumor no stone. A retrograde pyelogram was performed on the left side, findings include a 6 mm filling defects left distal ureter,  therefore a 035 Glidewire was inserted followed by an insertion of semirigid ureteroscope. Stone was encountered and then a 365 µm holmium laser fiber brought in and a standard laser lithotripsy was performed. The stone broken into smaller pieces and stone fragments basketing with performed, no damage to the ureter, no bleeding. The ureteroscope was removed and procedure was terminated. Bladder was emptied and scope removed.  Patient extubated and returned to PACU in a stable condition.  Complications:  None; patient tolerated the procedure well.    Disposition: PACU - hemodynamically stable.  Condition: stable               Plan  Cipro 500 mg twice a day for 7 days  Norco x 20  Follow-up as needed    Attending Attestation: I performed the procedure.    Jonathan Zhang  Phone Number: [phone]

## 2025-01-14 NOTE — ANESTHESIA PROCEDURE NOTES
Airway  Date/Time: 1/14/2025 1:48 PM    Staffing  Performed: CRNA   Authorized by: ELIZABETH Hicks    Performed by: ELIZABETH Hicks  Patient location during procedure: OR    Indications and Patient Condition  Indications for airway management: anesthesia  Spontaneous Ventilation: absent  Sedation level: deep  Patient position: sniffing  Mask difficulty assessment: 0 - not attempted  Planned trial extubation    Final Airway Details  Final airway type: supraglottic airway      Successful airway: Supraglottic airway: I-gel.  Size 4     Number of attempts at approach: 1    Additional Comments  Atraumatic LMA insertion.

## 2025-01-14 NOTE — ANESTHESIA PREPROCEDURE EVALUATION
Patient: Cassidy Arnold    Procedure Information       Date/Time: 01/14/25 1417    Procedure: cystoscopy pyelogram left ureteroscopy holmium laser lithotripsy left stent insertion ( c-arm) (Left) - cystoscopy pyelogram  left ureteroscopy holmium laser lithotripsy left stent insertion   ( olga lidia 60 min)    Location: [facility] 05 / [facility]    Surgeons: Jonathan Zhang MD            Relevant Problems   Anesthesia (within normal limits)      Cardiac   (+) Chest pain, unspecified      Endocrine   (+) Class 1 obesity due to excess calories without serious comorbidity with body mass index (BMI) of 30.0 to 30.9 in adult       Clinical information reviewed:   Tobacco  Allergies  Meds  Problems  Med Hx  Surg Hx  OB Status    Fam Hx  Soc Hx        NPO Detail:  NPO/Void Status  Carbohydrate Drink Given Prior to Surgery? : N  Date of Last Liquid: 01/14/25  Time of Last Liquid: 0900  Date of Last Solid: 01/13/25  Time of Last Solid: 2000  Last Intake Type: Clear fluids         Physical Exam    Airway  Mallampati: II  TM distance: >3 FB  Neck ROM: full     Cardiovascular - normal exam     Dental - normal exam     Pulmonary - normal exam     Abdominal - normal exam         Anesthesia Plan    History of general anesthesia?: yes  History of complications of general anesthesia?: no    ASA 2     general     The patient is a current smoker.  Patient was previously instructed to abstain from smoking on day of procedure.  Patient smoked on day of procedure.    intravenous induction   Postoperative administration of opioids is intended.  Anesthetic plan and risks discussed with patient.    Plan discussed with CRNA.

## 2025-01-14 NOTE — ANESTHESIA POSTPROCEDURE EVALUATION
Patient: Cassidy Arnold    Procedure Summary       Date: 01/14/25 Room / Location: [facility] / [facility]    Anesthesia Start: 1341 Anesthesia Stop: 1420    Procedure: cystoscopy pyelogram left ureteroscopy holmium laser lithotripsy , BASKET RETRIVAL( c-arm) (Left) Diagnosis:       Ureteral stone      (Ureteral stone [N20.1])    Surgeons: Jonathan Zhang MD Responsible Provider: ELIZABETH Hicks    Anesthesia Type: general ASA Status: 2            Anesthesia Type: general    Vitals Value Taken Time   /64 01/14/25 1440   Temp 36.9 °C (98.5 °F) 01/14/25 1420   Pulse 72 01/14/25 1444   Resp 13 01/14/25 1444   SpO2 98 % 01/14/25 1444   Vitals shown include unfiled device data.    Anesthesia Post Evaluation    Patient location during evaluation: PACU  Patient participation: complete - patient participated  Level of consciousness: awake  Pain score: 5  Pain management: adequate  Airway patency: patent  Cardiovascular status: acceptable  Respiratory status: acceptable  Hydration status: acceptable  Postoperative Nausea and Vomiting: none    No notable events documented.

## 2025-01-14 NOTE — INTERVAL H&P NOTE
H&P reviewed. The patient was examined and there are no changes to the H&P.    Heart: S1-S2    Lungs: Clear       97

## 2025-01-17 LAB
APPEARANCE STONE: NORMAL
COMPN STONE: NORMAL
SPECIMEN WT: 24 MG

## 2025-01-18 ENCOUNTER — APPOINTMENT (OUTPATIENT)
Dept: RADIOLOGY | Facility: HOSPITAL | Age: 60
End: 2025-01-18
Payer: COMMERCIAL

## 2025-01-18 ENCOUNTER — HOSPITAL ENCOUNTER (EMERGENCY)
Facility: HOSPITAL | Age: 60
Discharge: HOME | End: 2025-01-18
Payer: COMMERCIAL

## 2025-01-18 VITALS
DIASTOLIC BLOOD PRESSURE: 76 MMHG | OXYGEN SATURATION: 100 % | HEART RATE: 72 BPM | TEMPERATURE: 97.6 F | WEIGHT: 188 LBS | HEIGHT: 65 IN | SYSTOLIC BLOOD PRESSURE: 128 MMHG | RESPIRATION RATE: 16 BRPM | BODY MASS INDEX: 31.32 KG/M2

## 2025-01-18 DIAGNOSIS — R10.9 LEFT FLANK PAIN: Primary | ICD-10-CM

## 2025-01-18 LAB
ALBUMIN SERPL BCP-MCNC: 4.4 G/DL (ref 3.4–5)
ALP SERPL-CCNC: 86 U/L (ref 33–110)
ALT SERPL W P-5'-P-CCNC: 82 U/L (ref 7–45)
ANION GAP SERPL CALC-SCNC: 12 MMOL/L (ref 10–20)
APPEARANCE UR: CLEAR
AST SERPL W P-5'-P-CCNC: 54 U/L (ref 9–39)
BASOPHILS # BLD AUTO: 0.07 X10*3/UL (ref 0–0.1)
BASOPHILS NFR BLD AUTO: 0.4 %
BILIRUB SERPL-MCNC: 0.6 MG/DL (ref 0–1.2)
BILIRUB UR STRIP.AUTO-MCNC: NEGATIVE MG/DL
BUN SERPL-MCNC: 14 MG/DL (ref 6–23)
CALCIUM SERPL-MCNC: 9.2 MG/DL (ref 8.6–10.3)
CHLORIDE SERPL-SCNC: 106 MMOL/L (ref 98–107)
CO2 SERPL-SCNC: 26 MMOL/L (ref 21–32)
COLOR UR: YELLOW
CREAT SERPL-MCNC: 0.7 MG/DL (ref 0.5–1.05)
EGFRCR SERPLBLD CKD-EPI 2021: >90 ML/MIN/1.73M*2
EOSINOPHIL # BLD AUTO: 0.17 X10*3/UL (ref 0–0.7)
EOSINOPHIL NFR BLD AUTO: 1.1 %
ERYTHROCYTE [DISTWIDTH] IN BLOOD BY AUTOMATED COUNT: 13.5 % (ref 11.5–14.5)
GLUCOSE SERPL-MCNC: 123 MG/DL (ref 74–99)
GLUCOSE UR STRIP.AUTO-MCNC: NORMAL MG/DL
HCT VFR BLD AUTO: 47.1 % (ref 36–46)
HGB BLD-MCNC: 15.3 G/DL (ref 12–16)
HOLD SPECIMEN: NORMAL
IMM GRANULOCYTES # BLD AUTO: 0.06 X10*3/UL (ref 0–0.7)
IMM GRANULOCYTES NFR BLD AUTO: 0.4 % (ref 0–0.9)
KETONES UR STRIP.AUTO-MCNC: NEGATIVE MG/DL
LACTATE SERPL-SCNC: 1.7 MMOL/L (ref 0.4–2)
LEUKOCYTE ESTERASE UR QL STRIP.AUTO: NEGATIVE
LYMPHOCYTES # BLD AUTO: 1.65 X10*3/UL (ref 1.2–4.8)
LYMPHOCYTES NFR BLD AUTO: 10.2 %
MCH RBC QN AUTO: 29.4 PG (ref 26–34)
MCHC RBC AUTO-ENTMCNC: 32.5 G/DL (ref 32–36)
MCV RBC AUTO: 91 FL (ref 80–100)
MONOCYTES # BLD AUTO: 0.9 X10*3/UL (ref 0.1–1)
MONOCYTES NFR BLD AUTO: 5.6 %
MUCOUS THREADS #/AREA URNS AUTO: ABNORMAL /LPF
NEUTROPHILS # BLD AUTO: 13.34 X10*3/UL (ref 1.2–7.7)
NEUTROPHILS NFR BLD AUTO: 82.3 %
NITRITE UR QL STRIP.AUTO: NEGATIVE
NRBC BLD-RTO: 0 /100 WBCS (ref 0–0)
PH UR STRIP.AUTO: 6 [PH]
PLATELET # BLD AUTO: 266 X10*3/UL (ref 150–450)
POTASSIUM SERPL-SCNC: 3.8 MMOL/L (ref 3.5–5.3)
PROT SERPL-MCNC: 6.9 G/DL (ref 6.4–8.2)
PROT UR STRIP.AUTO-MCNC: ABNORMAL MG/DL
RBC # BLD AUTO: 5.2 X10*6/UL (ref 4–5.2)
RBC # UR STRIP.AUTO: ABNORMAL /UL
RBC #/AREA URNS AUTO: >20 /HPF
SODIUM SERPL-SCNC: 140 MMOL/L (ref 136–145)
SP GR UR STRIP.AUTO: 1.03
SQUAMOUS #/AREA URNS AUTO: ABNORMAL /HPF
UROBILINOGEN UR STRIP.AUTO-MCNC: NORMAL MG/DL
WBC # BLD AUTO: 16.2 X10*3/UL (ref 4.4–11.3)
WBC #/AREA URNS AUTO: >50 /HPF

## 2025-01-18 PROCEDURE — 2550000001 HC RX 255 CONTRASTS: Performed by: PHYSICIAN ASSISTANT

## 2025-01-18 PROCEDURE — 74177 CT ABD & PELVIS W/CONTRAST: CPT

## 2025-01-18 PROCEDURE — 96375 TX/PRO/DX INJ NEW DRUG ADDON: CPT | Performed by: PHYSICIAN ASSISTANT

## 2025-01-18 PROCEDURE — 74177 CT ABD & PELVIS W/CONTRAST: CPT | Performed by: RADIOLOGY

## 2025-01-18 PROCEDURE — 36415 COLL VENOUS BLD VENIPUNCTURE: CPT | Performed by: STUDENT IN AN ORGANIZED HEALTH CARE EDUCATION/TRAINING PROGRAM

## 2025-01-18 PROCEDURE — 80053 COMPREHEN METABOLIC PANEL: CPT | Performed by: STUDENT IN AN ORGANIZED HEALTH CARE EDUCATION/TRAINING PROGRAM

## 2025-01-18 PROCEDURE — 96365 THER/PROPH/DIAG IV INF INIT: CPT | Performed by: PHYSICIAN ASSISTANT

## 2025-01-18 PROCEDURE — 87040 BLOOD CULTURE FOR BACTERIA: CPT | Mod: PORLAB | Performed by: PHYSICIAN ASSISTANT

## 2025-01-18 PROCEDURE — 81001 URINALYSIS AUTO W/SCOPE: CPT | Performed by: STUDENT IN AN ORGANIZED HEALTH CARE EDUCATION/TRAINING PROGRAM

## 2025-01-18 PROCEDURE — 87086 URINE CULTURE/COLONY COUNT: CPT | Mod: PORLAB | Performed by: STUDENT IN AN ORGANIZED HEALTH CARE EDUCATION/TRAINING PROGRAM

## 2025-01-18 PROCEDURE — 36415 COLL VENOUS BLD VENIPUNCTURE: CPT | Performed by: PHYSICIAN ASSISTANT

## 2025-01-18 PROCEDURE — 85025 COMPLETE CBC W/AUTO DIFF WBC: CPT | Performed by: STUDENT IN AN ORGANIZED HEALTH CARE EDUCATION/TRAINING PROGRAM

## 2025-01-18 PROCEDURE — 2500000004 HC RX 250 GENERAL PHARMACY W/ HCPCS (ALT 636 FOR OP/ED): Performed by: PHYSICIAN ASSISTANT

## 2025-01-18 PROCEDURE — 99285 EMERGENCY DEPT VISIT HI MDM: CPT | Mod: 25

## 2025-01-18 PROCEDURE — 83605 ASSAY OF LACTIC ACID: CPT | Performed by: PHYSICIAN ASSISTANT

## 2025-01-18 RX ORDER — CEFTRIAXONE 2 G/50ML
2 INJECTION, SOLUTION INTRAVENOUS ONCE
Status: COMPLETED | OUTPATIENT
Start: 2025-01-18 | End: 2025-01-18

## 2025-01-18 RX ORDER — MORPHINE SULFATE 4 MG/ML
4 INJECTION INTRAVENOUS ONCE
Status: COMPLETED | OUTPATIENT
Start: 2025-01-18 | End: 2025-01-18

## 2025-01-18 RX ORDER — HYDROMORPHONE HYDROCHLORIDE 1 MG/ML
1 INJECTION, SOLUTION INTRAMUSCULAR; INTRAVENOUS; SUBCUTANEOUS ONCE
Status: COMPLETED | OUTPATIENT
Start: 2025-01-18 | End: 2025-01-18

## 2025-01-18 RX ORDER — OXYCODONE AND ACETAMINOPHEN 5; 325 MG/1; MG/1
1 TABLET ORAL EVERY 6 HOURS PRN
Qty: 5 TABLET | Refills: 0 | Status: SHIPPED | OUTPATIENT
Start: 2025-01-18 | End: 2025-01-21

## 2025-01-18 RX ORDER — ONDANSETRON HYDROCHLORIDE 2 MG/ML
4 INJECTION, SOLUTION INTRAVENOUS ONCE
Status: COMPLETED | OUTPATIENT
Start: 2025-01-18 | End: 2025-01-18

## 2025-01-18 RX ADMIN — MORPHINE SULFATE 4 MG: 4 INJECTION, SOLUTION INTRAMUSCULAR; INTRAVENOUS at 10:41

## 2025-01-18 RX ADMIN — HYDROMORPHONE HYDROCHLORIDE 1 MG: 1 INJECTION, SOLUTION INTRAMUSCULAR; INTRAVENOUS; SUBCUTANEOUS at 11:45

## 2025-01-18 RX ADMIN — CEFTRIAXONE SODIUM 2 G: 2 INJECTION, SOLUTION INTRAVENOUS at 10:41

## 2025-01-18 RX ADMIN — IOHEXOL 75 ML: 350 INJECTION, SOLUTION INTRAVENOUS at 11:22

## 2025-01-18 RX ADMIN — SODIUM CHLORIDE 1000 ML: 9 INJECTION, SOLUTION INTRAVENOUS at 10:41

## 2025-01-18 RX ADMIN — ONDANSETRON 4 MG: 2 INJECTION INTRAMUSCULAR; INTRAVENOUS at 10:41

## 2025-01-18 ASSESSMENT — PAIN SCALES - GENERAL
PAINLEVEL_OUTOF10: 9
PAINLEVEL_OUTOF10: 10 - WORST POSSIBLE PAIN
PAINLEVEL_OUTOF10: 5 - MODERATE PAIN

## 2025-01-18 ASSESSMENT — PAIN DESCRIPTION - ORIENTATION: ORIENTATION: LEFT

## 2025-01-18 ASSESSMENT — LIFESTYLE VARIABLES
HAVE YOU EVER FELT YOU SHOULD CUT DOWN ON YOUR DRINKING: NO
EVER FELT BAD OR GUILTY ABOUT YOUR DRINKING: NO
HAVE PEOPLE ANNOYED YOU BY CRITICIZING YOUR DRINKING: NO
EVER HAD A DRINK FIRST THING IN THE MORNING TO STEADY YOUR NERVES TO GET RID OF A HANGOVER: NO
TOTAL SCORE: 0

## 2025-01-18 ASSESSMENT — PAIN DESCRIPTION - LOCATION
LOCATION: ABDOMEN
LOCATION: BACK

## 2025-01-18 ASSESSMENT — PAIN - FUNCTIONAL ASSESSMENT
PAIN_FUNCTIONAL_ASSESSMENT: 0-10
PAIN_FUNCTIONAL_ASSESSMENT: 0-10

## 2025-01-18 NOTE — DISCHARGE INSTRUCTIONS
Please follow-up with your primary care provider 1 to 2 days, or return to the emergency department immediately with any worsening symptoms.    If any medications were prescribed please take them as instructed.    Call Dr. Zhang for follow-up on Monday.

## 2025-01-18 NOTE — ED PROVIDER NOTES
EMERGENCY MEDICINE EVALUATION NOTE    History of Present Illness     Chief Complaint:   Chief Complaint   Patient presents with    Flank Pain     Kidney stone removed Tuesday at [facility]       HPI: Cassidy Arnold is a 59 y.o. female presents with a chief complaint of left flank pain.  Patient where she is having sharp stabbing pain in her left leg that radiates to her left lower quadrant.  She reports that on Tuesday she had a stone removed by Dr. Davis.  She states he is unsure of the removal process.  After reviewing chart.  Patient had lithotripsy with basket removal.  Patient states when she woke this morning she started having stabbing pain that caused vomiting.  She states her pain is currently an 8 out of 10.  Reports she had a little bit of streaks of blood in her urine but nothing significant.  She believes she is on antibiotics at home but she is unsure of the name.  Patient denies any change in her bowel habits.  She denies any fevers or chills.    Previous History     Past Medical History:   Diagnosis Date    Anxiety     Arthritis     COPD (chronic obstructive pulmonary disease) (Multi)     GERD (gastroesophageal reflux disease)     HL (hearing loss)     Hypertension     Kidney stones     PONV (postoperative nausea and vomiting)     Shortness of breath      Past Surgical History:   Procedure Laterality Date    APPENDECTOMY       SECTION, CLASSIC      THORACIC AORTIC ANEURYSM REPAIR       Social History     Tobacco Use    Smoking status: Every Day     Current packs/day: 0.25     Types: Cigarettes    Smokeless tobacco: Never   Vaping Use    Vaping status: Never Used   Substance Use Topics    Alcohol use: Not Currently    Drug use: Never     Family History   Problem Relation Name Age of Onset    Heart failure Mother      Other (pulmonary htn) Mother      Pancreatic cancer Father      Nephrolithiasis Sister      Diabetes Brother       Allergies   Allergen Reactions    Amoxicillin-Pot Clavulanate Hives     Tetracyclines Nausea Only    Metronidazole Rash    Sulfa (Sulfonamide Antibiotics) Rash and Hives     Current Outpatient Medications   Medication Instructions    acetaminophen (Tylenol) 325 mg tablet Every 6 hours PRN    albuterol 90 mcg/actuation inhaler 2 puffs, Every 4 hours PRN    Anoro Ellipta 62.5-25 mcg/actuation blister with device 1 puff, inhalation, Daily    busPIRone (BUSPAR) 10 mg, 2 times daily    ciprofloxacin (CIPRO) 500 mg, oral, 2 times daily    citalopram (CELEXA) 40 mg, Daily    famotidine (PEPCID) 20 mg, oral, Daily PRN, Once daily as needed.    fluticasone (Flonase) 50 mcg/actuation nasal spray 1 spray, Each Nostril, Daily    HYDROcodone-acetaminophen (Norco) 5-325 mg tablet 1 tablet, oral, Every 6 hours PRN    metoprolol tartrate (Lopressor) 50 mg tablet oral, 2 times daily    montelukast (SINGULAIR) 10 mg, oral, Nightly    nicotine (Nicoderm CQ) 21 mg/24 hr patch 1 patch, transdermal, Every 24 hours    oxyCODONE-acetaminophen (Percocet) 5-325 mg tablet 1 tablet, oral, Every 6 hours PRN    pravastatin (PRAVACHOL) 20 mg, oral, Every evening       Physical Exam     Appearance: Alert, oriented , cooperative.  Patient appears very comfortable actively vomiting on examination.     Skin: Intact,  dry skin, no lesions, rash, petechiae or purpura.      Eyes: PERRLA, EOMs intact,  Conjunctiva pink      ENT: Hearing grossly intact. Pharynx clear     Neck: Supple. Trachea at midline.      Pulmonary: Clear bilaterally. No rales, rhonchi or wheezing. No accessory muscle use or stridor.     Cardiac: Normal rate and rhythm without murmur     Abdomen: Soft, active bowel sounds.  Left-sided CVA tenderness.     Musculoskeletal: Full range of motion.      Neurological:Cranial nerves II through XII are grossly intact, normal sensation, no weakness, no focal findings identified.     Results     Labs Reviewed   CBC WITH AUTO DIFFERENTIAL - Abnormal       Result Value    WBC 16.2 (*)     nRBC 0.0      RBC 5.20       Hemoglobin 15.3      Hematocrit 47.1 (*)     MCV 91      MCH 29.4      MCHC 32.5      RDW 13.5      Platelets 266      Neutrophils % 82.3      Immature Granulocytes %, Automated 0.4      Lymphocytes % 10.2      Monocytes % 5.6      Eosinophils % 1.1      Basophils % 0.4      Neutrophils Absolute 13.34 (*)     Immature Granulocytes Absolute, Automated 0.06      Lymphocytes Absolute 1.65      Monocytes Absolute 0.90      Eosinophils Absolute 0.17      Basophils Absolute 0.07     COMPREHENSIVE METABOLIC PANEL - Abnormal    Glucose 123 (*)     Sodium 140      Potassium 3.8      Chloride 106      Bicarbonate 26      Anion Gap 12      Urea Nitrogen 14      Creatinine 0.70      eGFR >90      Calcium 9.2      Albumin 4.4      Alkaline Phosphatase 86      Total Protein 6.9      AST 54 (*)     Bilirubin, Total 0.6      ALT 82 (*)    URINALYSIS WITH REFLEX CULTURE AND MICROSCOPIC - Abnormal    Color, Urine Yellow      Appearance, Urine Clear      Specific Gravity, Urine 1.028      pH, Urine 6.0      Protein, Urine 30 (1+) (*)     Glucose, Urine Normal      Blood, Urine 0.5 (2+) (*)     Ketones, Urine NEGATIVE      Bilirubin, Urine NEGATIVE      Urobilinogen, Urine Normal      Nitrite, Urine NEGATIVE      Leukocyte Esterase, Urine NEGATIVE     URINALYSIS MICROSCOPIC WITH REFLEX CULTURE - Abnormal    WBC, Urine >50 (*)     RBC, Urine >20 (*)     Squamous Epithelial Cells, Urine 1-9 (SPARSE)      Mucus, Urine FEW     LACTATE - Normal    Lactate 1.7      Narrative:     Venipuncture immediately after or during the administration of Metamizole may lead to falsely low results. Testing should be performed immediately prior to Metamizole dosing.   URINE CULTURE   BLOOD CULTURE   BLOOD CULTURE   URINALYSIS WITH REFLEX CULTURE AND MICROSCOPIC    Narrative:     The following orders were created for panel order Urinalysis with Reflex Culture and Microscopic.  Procedure                               Abnormality         Status                 "     ---------                               -----------         ------                     Urinalysis with Reflex C...[[id number]]  Abnormal            Final result               Extra Urine Gray Tube[[id number]]                            In process                   Please view results for these tests on the individual orders.   EXTRA URINE GRAY TUBE     CT abdomen pelvis w IV contrast   Final Result   1.  Interval removal or fragmentation of the distal left ureter stone   with residual edema and possibly in measurably small fragments of the   stone at the ureterovesical junction resulting in mild left   obstructive uropathy. See discussion above.             MACRO:   None        Signed by: Joseph Schoenberger 1/18/2025 11:53 AM   Dictation workstation:   [id number]            ED Course & Medical Decision Making     Medications   morphine injection 4 mg (4 mg intravenous Given 1/18/25 1041)   ondansetron (Zofran) injection 4 mg (4 mg intravenous Given 1/18/25 1041)   sodium chloride 0.9 % bolus 1,000 mL (0 mL intravenous Stopped 1/18/25 1145)   cefTRIAXone (Rocephin) 2 g in dextrose (iso) IV 50 mL (0 g intravenous Stopped 1/18/25 1145)   iohexol (OMNIPaque) 350 mg iodine/mL solution 75 mL (75 mL intravenous Given 1/18/25 1122)   HYDROmorphone (Dilaudid) injection 1 mg (1 mg intravenous Given 1/18/25 1145)     Heart Rate:  [77]   Temperature:  [35.7 °C (96.2 °F)]   Respirations:  [16]   BP: (138)/(80)   Height:  [165.1 cm (5' 5\")]   Weight:  [85.3 kg (188 lb)]   Pulse Ox:  [99 %]    ED Course as of 01/18/25 1242   Sat Jan 18, 2025   1234 To the patient's urologist Dr. Zhang who states the patient to be discharged home to follow-up with them as an outpatient on Monday. [CJ]   1238 Updated the patient on the discussion with Dr. Zhang.  She states that she does feel much better and her pain is controlled at this time.  I will discharge the patient home with stronger pain medication.  Patient is already on antibiotics " that she has enough to get her until Monday.  Patient also given a work note to have off until Monday.  Patient encouraged to follow-up with urologist on Monday or return the emergency room immediately with any worsening symptoms. [CJ]      ED Course User Index  [CJ] Rafa Zuluaga PA-C         Diagnoses as of 01/18/25 1242   Left flank pain       Procedures   Procedures    Diagnosis     1. Left flank pain        Disposition   Discharged    ED Prescriptions       Medication Sig Dispense Start Date End Date Auth. Provider    oxyCODONE-acetaminophen (Percocet) 5-325 mg tablet Take 1 tablet by mouth every 6 hours if needed for severe pain (7 - 10) for up to 3 days. 5 tablet 1/18/2025 1/21/2025 Rafa Zuluaga PA-C            Disclaimer: This note was dictated by speech recognition. Minor errors in transcription may be present. Please call if questions.       Rafa Zuluaga PA-C  01/18/25 1242

## 2025-01-18 NOTE — Clinical Note
Cassidy Arnold was seen and treated in our emergency department on 1/18/2025.  She may return to work on 01/21/2025.       If you have any questions or concerns, please don't hesitate to call.      Rafa uZluaga PA-C

## 2025-01-19 LAB
BACTERIA BLD CULT: NORMAL
BACTERIA BLD CULT: NORMAL

## 2025-01-20 LAB — BACTERIA UR CULT: NO GROWTH

## 2025-01-22 LAB
BACTERIA BLD CULT: NORMAL
BACTERIA BLD CULT: NORMAL

## 2025-01-31 DIAGNOSIS — J44.9 COPD, MILD (MULTI): Primary | ICD-10-CM

## 2025-01-31 RX ORDER — ALBUTEROL SULFATE 90 UG/1
2 INHALANT RESPIRATORY (INHALATION) EVERY 4 HOURS PRN
Qty: 7 G | Refills: 0 | Status: SHIPPED | OUTPATIENT
Start: 2025-01-31

## 2025-02-06 ENCOUNTER — OFFICE VISIT (OUTPATIENT)
Dept: URGENT CARE | Age: 60
End: 2025-02-06
Payer: COMMERCIAL

## 2025-02-06 ENCOUNTER — HOSPITAL ENCOUNTER (EMERGENCY)
Facility: HOSPITAL | Age: 60
Discharge: HOME | End: 2025-02-06
Attending: STUDENT IN AN ORGANIZED HEALTH CARE EDUCATION/TRAINING PROGRAM
Payer: COMMERCIAL

## 2025-02-06 VITALS
TEMPERATURE: 98.1 F | HEART RATE: 98 BPM | RESPIRATION RATE: 20 BRPM | DIASTOLIC BLOOD PRESSURE: 82 MMHG | SYSTOLIC BLOOD PRESSURE: 134 MMHG | OXYGEN SATURATION: 97 %

## 2025-02-06 VITALS
WEIGHT: 180 LBS | TEMPERATURE: 97.2 F | RESPIRATION RATE: 18 BRPM | DIASTOLIC BLOOD PRESSURE: 81 MMHG | OXYGEN SATURATION: 96 % | BODY MASS INDEX: 29.99 KG/M2 | HEIGHT: 65 IN | HEART RATE: 96 BPM | SYSTOLIC BLOOD PRESSURE: 128 MMHG

## 2025-02-06 DIAGNOSIS — H53.8 BLURRED VISION, LEFT EYE: Primary | ICD-10-CM

## 2025-02-06 DIAGNOSIS — H54.62 VISION LOSS OF LEFT EYE: Primary | ICD-10-CM

## 2025-02-06 PROCEDURE — 76512 OPH US DX B-SCAN: CPT

## 2025-02-06 PROCEDURE — 99283 EMERGENCY DEPT VISIT LOW MDM: CPT | Performed by: STUDENT IN AN ORGANIZED HEALTH CARE EDUCATION/TRAINING PROGRAM

## 2025-02-06 ASSESSMENT — PAIN SCALES - GENERAL: PAINLEVEL_OUTOF10: 0 - NO PAIN

## 2025-02-06 ASSESSMENT — LIFESTYLE VARIABLES
TOTAL SCORE: 0
EVER FELT BAD OR GUILTY ABOUT YOUR DRINKING: NO
HAVE YOU EVER FELT YOU SHOULD CUT DOWN ON YOUR DRINKING: NO
EVER HAD A DRINK FIRST THING IN THE MORNING TO STEADY YOUR NERVES TO GET RID OF A HANGOVER: NO
HAVE PEOPLE ANNOYED YOU BY CRITICIZING YOUR DRINKING: NO

## 2025-02-06 ASSESSMENT — COLUMBIA-SUICIDE SEVERITY RATING SCALE - C-SSRS
6. HAVE YOU EVER DONE ANYTHING, STARTED TO DO ANYTHING, OR PREPARED TO DO ANYTHING TO END YOUR LIFE?: NO
2. HAVE YOU ACTUALLY HAD ANY THOUGHTS OF KILLING YOURSELF?: NO
1. IN THE PAST MONTH, HAVE YOU WISHED YOU WERE DEAD OR WISHED YOU COULD GO TO SLEEP AND NOT WAKE UP?: NO

## 2025-02-06 ASSESSMENT — VISUAL ACUITY: OU: 1

## 2025-02-06 ASSESSMENT — PAIN - FUNCTIONAL ASSESSMENT: PAIN_FUNCTIONAL_ASSESSMENT: 0-10

## 2025-02-06 NOTE — PROGRESS NOTES
Patient presents to clinic today with complaints of left eye.  Patient states that on 2 3 she was having episodes of color changes of the left eye as well as a sensation of a curtain being pulled over the eye.  Patient states she is still having pressure to the left eye.    Pupils are PERRLA and EOM intact however I discussed with patient I cannot do a full examination including eye pressure and the possibility of her symptoms related to a retinal detachment I advised patient she should go to the emergency room for further evaluation.  Patient does not have an appointment with an eye doctor till April.    Patient in agreement with plan.

## 2025-02-06 NOTE — ED PROCEDURE NOTE
Procedure    Performed by: Joaquim Carbajal MD  Authorized by: Bacilio Santillan DO              Ocular Indications: vision loss    Procedure: Ocular Ultrasound    Findings:  Vitreous: The vitreous was identified and NO abnormalities were visualized.  Retina: The retina was visualized and there was NO retinal detachment.  L Optic Nerve: The LEFT optic nerve was identified and was LESS than 5 mm.  R Optic Nerve: The RIGHT optic nerve was identified and was LESS than 5 mm.    Impression:  Ocular: The ocular US exam was NEGATIVE for abnormalities as described.                   Joaquim Carbajal MD  Resident  02/06/25 2684

## 2025-02-06 NOTE — ED PROVIDER NOTES
"Chief Complaint   Patient presents with    loss of vision in left eye on the 3rd of this month. Vision       HPI       59 year old female presents to the Emergency Department today complaining of intermittent vision loss in the left eye since [place]. Notes that the episodes are self-limited and last seconds to minutes. Notes that the last episode was on 2/3/25. Reports that she sees a flashing light or a curtain sensation out of the lateral aspect of the vision of her left eye. Notes that she has a \"funny sensation\" in her left eye as well. Denies any associated fever, chills, headache, neck pain, chest pain, shortness of breath, abdominal pain, nausea, vomiting, diarrhea, constipation, or urinary symptoms.       History provided by:  Patient             Patient History   Past Medical History:   Diagnosis Date    Anxiety     Arthritis     COPD (chronic obstructive pulmonary disease) (Multi)     GERD (gastroesophageal reflux disease)     HL (hearing loss)     Hypertension     Kidney stones     PONV (postoperative nausea and vomiting)     Shortness of breath      Past Surgical History:   Procedure Laterality Date    APPENDECTOMY       SECTION, CLASSIC      THORACIC AORTIC ANEURYSM REPAIR       Family History   Problem Relation Name Age of Onset    Heart failure Mother      Other (pulmonary htn) Mother      Pancreatic cancer Father      Nephrolithiasis Sister      Diabetes Brother       Social History     Tobacco Use    Smoking status: Every Day     Current packs/day: 0.25     Types: Cigarettes    Smokeless tobacco: Never   Vaping Use    Vaping status: Never Used   Substance Use Topics    Alcohol use: Not Currently    Drug use: Never           Physical Exam  Constitutional:       General: She is awake.      Appearance: Normal appearance.   Eyes:      General: Vision grossly intact.      Extraocular Movements: Extraocular movements intact.      Conjunctiva/sclera:      Left eye: Left conjunctiva is not " injected. No chemosis, exudate or hemorrhage.     Pupils: Pupils are equal, round, and reactive to light.   Cardiovascular:      Rate and Rhythm: Normal rate and regular rhythm.      Pulses:           Radial pulses are 3+ on the right side and 3+ on the left side.      Heart sounds: Normal heart sounds. No murmur heard.     No friction rub. No gallop.   Pulmonary:      Effort: Pulmonary effort is normal.      Breath sounds: Normal breath sounds and air entry.   Neurological:      Mental Status: She is alert.   Psychiatric:         Behavior: Behavior is cooperative.         Labs Reviewed - No data to display    Point of Care Ultrasound                  ED Course & MDM   Diagnoses as of 02/06/25 5956   Vision loss of left eye           Medical Decision Making  Patient was seen and evaluated by Dr. Santillan. POCUS shows no acute abnormalities. There are no signs of a retinal detachment. Given ophthalmology for follow up. Return if worse in any way. Discharged in stable condition with computer instructions.    Diagnostic Impression:    1. Acute transient vision loss in the left eye           Your medication list        ASK your doctor about these medications        Instructions Last Dose Given Next Dose Due   acetaminophen 325 mg tablet  Commonly known as: Tylenol           albuterol 90 mcg/actuation inhaler      Inhale 2 puffs every 4 hours if needed for wheezing.       Anoro Ellipta 62.5-25 mcg/actuation blister with device  Generic drug: umeclidinium-vilanteroL      Inhale 1 puff by mouth once daily       busPIRone 10 mg tablet  Commonly known as: Buspar           citalopram 40 mg tablet  Commonly known as: CeleXA           famotidine 20 mg tablet  Commonly known as: Pepcid           fluticasone 50 mcg/actuation nasal spray  Commonly known as: Flonase      Administer 1 spray into each nostril once daily.       HYDROcodone-acetaminophen 5-325 mg tablet  Commonly known as: Norco      Take 1 tablet by mouth every 6 hours  if needed for severe pain (7 - 10).       metoprolol tartrate 50 mg tablet  Commonly known as: Lopressor      Take 1/2 (one-half) tablet by mouth twice daily       montelukast 10 mg tablet  Commonly known as: Singulair      Take 1 tablet (10 mg) by mouth once daily at bedtime.       nicotine 21 mg/24 hr patch  Commonly known as: Nicoderm CQ      Place 1 patch over 24 hours on the skin once every 24 hours.       pravastatin 20 mg tablet  Commonly known as: Pravachol      Take 1 tablet (20 mg) by mouth once daily in the evening.                  Procedure  Procedures     HIEN Quintanilla-JUANI  02/06/25 1734       HIEN Quintanilla-JUANI  02/06/25 1735

## 2025-02-20 ENCOUNTER — TELEPHONE (OUTPATIENT)
Dept: CARDIOLOGY | Facility: HOSPITAL | Age: 60
End: 2025-02-20
Payer: COMMERCIAL

## 2025-02-21 ENCOUNTER — TELEPHONE (OUTPATIENT)
Dept: CARDIOLOGY | Facility: HOSPITAL | Age: 60
End: 2025-02-21
Payer: COMMERCIAL

## 2025-02-21 NOTE — TELEPHONE ENCOUNTER
2/21/25  0935  Discussed with Dr. Redd request for echocardiogram.      Per Dr. Redd, either the PCP or ophthalmologist can order the echo since they are the ones requesting it.    Called patient; no answer. Left  voice message for either the ophthalmologist or PCP to order the echocardiogram and to call if questions.

## 2025-02-21 NOTE — TELEPHONE ENCOUNTER
2/21/25  1036  Returned call to patient and informed her that either the ophthalmologist or her PCP will need to order the echocardiogram; gave fax number to office.    Patient verbalized understanding.

## 2025-02-24 ENCOUNTER — OFFICE VISIT (OUTPATIENT)
Dept: URGENT CARE | Age: 60
End: 2025-02-24
Payer: COMMERCIAL

## 2025-02-24 VITALS
RESPIRATION RATE: 18 BRPM | HEART RATE: 87 BPM | DIASTOLIC BLOOD PRESSURE: 56 MMHG | TEMPERATURE: 98.9 F | SYSTOLIC BLOOD PRESSURE: 114 MMHG | OXYGEN SATURATION: 96 %

## 2025-02-24 DIAGNOSIS — J06.9 VIRAL UPPER RESPIRATORY TRACT INFECTION: Primary | ICD-10-CM

## 2025-02-24 DIAGNOSIS — Z20.822 SUSPECTED COVID-19 VIRUS INFECTION: ICD-10-CM

## 2025-02-24 DIAGNOSIS — R52 GENERALIZED BODY ACHES: ICD-10-CM

## 2025-02-24 LAB
POC RAPID INFLUENZA A: NEGATIVE
POC RAPID INFLUENZA B: NEGATIVE
POC SARS-COV-2 AG BINAX: NORMAL

## 2025-02-24 RX ORDER — METHYLPREDNISOLONE 4 MG/1
TABLET ORAL
Qty: 21 TABLET | Refills: 0 | Status: SHIPPED | OUTPATIENT
Start: 2025-02-24 | End: 2025-03-02

## 2025-02-24 ASSESSMENT — ENCOUNTER SYMPTOMS
SORE THROAT: 1
SINUS PRESSURE: 0
TROUBLE SWALLOWING: 0
WHEEZING: 0
ABDOMINAL PAIN: 0
FACIAL SWELLING: 0
DIZZINESS: 0
ACTIVITY CHANGE: 0
FEVER: 0
FATIGUE: 1
APPETITE CHANGE: 0
SINUS PAIN: 0
COUGH: 1
RHINORRHEA: 1
VOICE CHANGE: 0
CHEST TIGHTNESS: 0
MYALGIAS: 0
EYE DISCHARGE: 0
SHORTNESS OF BREATH: 0
EYE PAIN: 0
CHILLS: 0

## 2025-02-24 NOTE — LETTER
February 24, 2025     Patient: Cassidy Arnold   YOB: 1965   Date of Visit: 2/24/2025       To Whom It May Concern:    Cassidy Arnold was seen in my clinic on 2/24/2025 at 10:35 am. Please excuse Cassidy for her absence from work on this day to make the appointment.    If you have any questions or concerns, please don't hesitate to call.         Sincerely,         Juana Feliz, APRN-CNP        CC: No Recipients

## 2025-02-24 NOTE — PROGRESS NOTES
Subjective   Patient ID: Cassidy Arnold is a 59 y.o. female. They present today with a chief complaint of Generalized Body Aches.    History of Present Illness    History provided by:  Patient    Patient has had a cough, aches, fever, and chills for 2 days. Hx of COPD with mild sob, states she takes anoro daily.     Past Medical History  Allergies as of 2025 - Reviewed 2025   Allergen Reaction Noted    Amoxicillin-pot clavulanate Hives 2023    Tetracyclines Nausea Only 2023    Metronidazole Rash 2023    Sulfa (sulfonamide antibiotics) Rash and Hives 2022       (Not in a hospital admission)       Past Medical History:   Diagnosis Date    Anxiety     Arthritis     COPD (chronic obstructive pulmonary disease) (Multi)     GERD (gastroesophageal reflux disease)     HL (hearing loss)     Hypertension     Kidney stones     PONV (postoperative nausea and vomiting)     Shortness of breath        Past Surgical History:   Procedure Laterality Date    APPENDECTOMY       SECTION, CLASSIC      THORACIC AORTIC ANEURYSM REPAIR          reports that she has been smoking cigarettes. She has never used smokeless tobacco. She reports that she does not currently use alcohol. She reports that she does not use drugs.    Review of Systems  Review of Systems   Constitutional:  Positive for fatigue. Negative for activity change, appetite change, chills and fever.   HENT:  Positive for congestion, postnasal drip, rhinorrhea and sore throat. Negative for ear pain, facial swelling, mouth sores, sinus pressure, sinus pain, trouble swallowing and voice change.    Eyes:  Negative for pain and discharge.   Respiratory:  Positive for cough. Negative for chest tightness, shortness of breath and wheezing.    Cardiovascular:  Negative for chest pain.   Gastrointestinal:  Negative for abdominal pain.   Musculoskeletal:  Negative for myalgias.   Neurological:  Negative for dizziness and syncope.                                   Objective    Vitals:    02/24/25 1049   BP: 114/56   Pulse: 87   Resp: 18   Temp: 37.2 °C (98.9 °F)   SpO2: 96%     No LMP recorded. Patient is postmenopausal.    Physical Exam  Vitals reviewed.   Constitutional:       General: She is not in acute distress.     Appearance: Normal appearance.   HENT:      Right Ear: Tympanic membrane normal.      Left Ear: Tympanic membrane normal.      Nose: Congestion and rhinorrhea present.      Mouth/Throat:      Pharynx: No oropharyngeal exudate or posterior oropharyngeal erythema.   Eyes:      Conjunctiva/sclera: Conjunctivae normal.   Cardiovascular:      Rate and Rhythm: Normal rate and regular rhythm.   Pulmonary:      Effort: Pulmonary effort is normal.      Breath sounds: Normal breath sounds.   Skin:     General: Skin is warm and dry.   Neurological:      General: No focal deficit present.      Mental Status: She is alert.         Procedures    Point of Care Test & Imaging Results from this visit  Results for orders placed or performed in visit on 02/24/25   POCT BinaxNOW Covid-19 Ag Card manually resulted   Result Value Ref Range    POC ELVIN-COV-2 AG  Presumptive negative test for SARS-CoV-2 (no antigen detected)     Presumptive negative test for SARS-CoV-2 (no antigen detected)   POCT Influenza A/B manually resulted   Result Value Ref Range    POC Rapid Influenza A Negative Negative    POC Rapid Influenza B Negative Negative      No results found.    Diagnostic study results (if any) were reviewed by RODRIGUE Blanchard.    Assessment/Plan   Allergies, medications, history, and pertinent labs/EKGs/Imaging reviewed by RODRIGUE Blanchard.     Medical Decision Making  Patient's symptoms and history is consistent with likely viral upper respiratory infection.  Swabs were performed and negative for Covid/flu/strep  Return precautions provided at time of discharge, patient was otherwise hemodynamically stable and appropriate for  discharge home.     Orders and Diagnoses  Diagnoses and all orders for this visit:  Viral upper respiratory tract infection  -     methylPREDNISolone (Medrol Dospak) 4 mg tablets; Follow schedule on package instructions  Suspected COVID-19 virus infection  -     POCT BinaxNOW Covid-19 Ag Card manually resulted  Generalized body aches  -     POCT Influenza A/B manually resulted      Medical Admin Record      Patient disposition: Home    Electronically signed by RODRIGUE Blanchard  11:14 AM

## 2025-02-25 ENCOUNTER — TELEPHONE (OUTPATIENT)
Dept: CARDIOLOGY | Facility: HOSPITAL | Age: 60
End: 2025-02-25
Payer: COMMERCIAL

## 2025-02-25 NOTE — TELEPHONE ENCOUNTER
Dr. Martinez called office stating after patients most recent optometry visit he had noticed symptoms that could be a carotid artery and wanted to make our office aware. Dr. Martinez provided his personal number if we need further information from him [phone].

## 2025-03-07 PROBLEM — G45.3 AMAUROSIS FUGAX: Status: ACTIVE | Noted: 2025-03-07

## 2025-03-07 NOTE — PROGRESS NOTES
[facility]    Patient Name: Cassidy Arnold  Patient : 1965    Scribe Attestation  By signing my name below, Eva EDGAR, Evaibdonald attest that this documentation has been prepared under the direction and in the presence of Marty Redd DO.    Physician Attestation  Marty EDGAR DO, personally performed the services described in the documentation as scribed by Eva Messina in my presence, and confirm it is both accurate and complete.    Reason for visit:  This is a 59-year-old female here for follow-up regarding amaurosis Fugax, thoracic ascending aortic aneurysm status post ascending aortic replacement with a hemiarch 30 mm Gelweave vascular graft done in 2023, palpitations, dyslipidemia, history of tobacco use, and obesity.      HPI:  This is a 59-year-old female here for follow-up regarding amaurosis Fugax, thoracic ascending aortic aneurysm status post ascending aortic replacement with a hemiarch 30 mm Gelweave vascular graft done in 2023, palpitations, dyslipidemia, history of tobacco use, and obesity.  The patient was last evaluated by me in 2024.  At that time I refilled her pravastatin, asked that she remain off of lisinopril, and asked that she follow-up in 6 months and sooner if necessary.  Patient subsequently underwent cystoscopy and 2025.  Patient was seen in the emergency department in 2025 with flank pain.  Patient was again seen in the emergency department in 2025 secondary to reported intermittent vision loss in the left eye occurring since 2024.  CMP done 2025 showed normal serum sodium and potassium with a serum creatinine of 0.70, elevated ALT of 82 and AST of 54, lactate was 1.7, CBC showed a hemoglobin of 15.3.  A point-of-care ultrasound done while in the emergency department on 2025 was negative for abnormalities.  CT scan of the abdomen/pelvis done 2025 showed  interval removal of fragmentation of the distal left ureter stone with residual edema.  Holter monitor done in June 2024 showed underlying sinus rhythm with an average heart rate of 90 bpm, rare SVE/VE, 4 patient triggered events correlating with sinus rhythm.  Intraoperative TANIA done in July 2023 showed normal left ventricular systolic function, normal right ventricular systolic function, no significant valve abnormalities.  Patient was seen by ophthalmology who felt her symptoms represented amaurosis fugax and recommended a carotid ultrasound and echocardiogram.  It was recommended she be reseen in the emergency department with recurrent symptoms. ECG done today showed sinus rhythm with a heart rate of 75 bpm.  The patient reports that she has been feeling generally well from the cardiac standpoint. She denies any new chest pain, shortness of breath, palpitations and lightheadedness. She denies recurrent loss of vision since she was seen at the ED.  She state that her mother had a history of carotid artery disease. She states that she had quit smoking cigarettes. She states that she takes all of her medications as prescribed. During my exam, she was resting comfortably on the exam table.            Assessment/Plan:   Amaurosis Fugax  The patient has a history of amaurosis fugax and was seen in the emergency department in February 2025 secondary to reported intermittent vision loss in the left eye occurring since Christmas 2024.   Echocardiogram and carotid ultrasound were ordered as noted below.  Patient should continue to follow with Ophthalmology.  Follow up in 1 month and sooner if necessary.     2. Thoracic ascending aortic aneurysm  The patient has a history of thoracic ascending aortic aneurysm and underwent replacement of the ascending aorta with hemiarch using a 30 cm Gelweave vascular graft in July 2023.   Intraoperative TANIA done 7/7/2023 showed normal left ventricular systolic function, normal right  ventricular systolic function, no significant valve abnormalities.   CT scan of the lungs done in July 2024 showed no coronary artery calcification, couple of nodules in the lungs measuring up to 4 mm, and mild to moderate upper lung predominant emphysema.   Continue risk factor modification.      3. Palpitations  The patient had a history of palpitations which had improved since the last visit.  ECG done today showed  sinus rhythm with a heart rate of 75 bpm.     She should continue metoprolol for heart rate control.   Intraoperative TANIA done 7/7/2023 showed normal left ventricular systolic function, normal right ventricular systolic function, no significant valve abnormalities.   Recent lab works as noted in the HPI.   Patient should follow general recommendations including avoiding excessive alcohol intake, avoiding excessive caffeine intake, staying well-hydrated, getting an appropriate amount of sleep.  Follow up in 1 month and sooner if necessary.      4. Dyslipidemia  Lipid panel done in June 2024 showed an LDL cholesterol of 82 and triglycerides of 65 while on pravastatin 20 mg daily.  Please see lifestyle recommendations below.      5. History of tobacco use   The patient is a former smoker.  I discussed with her the importance of continued smoking cessation.     6. Obesity  Please see lifestyle recommendations below.          Orders:   Carotid ultrasound,   Echocardiogram,   Follow-up in 1 month.    Lifestyle Recommendations  I recommend a whole-food plant-based diet, an eating pattern that encourages the consumption of unrefined plant foods (such as fruits, vegetables, tubers, whole grains, legumes, nuts and seeds) and discourages meats, dairy products, eggs and processed foods.     The AHA/ACC recommends that the patient consume a dietary pattern that emphasizes intake of vegetables, fruits, and whole grains; includes low-fat dairy products, poultry, fish, legumes, non-tropical vegetable oils, and nuts;  and limits intake of sodium, sweets, sugar-sweetened beverages, and red meats.  Adapt this dietary pattern to appropriate calorie requirements (a 500-750 kcal/day deficit to loose weight), personal and cultural food preferences, and nutrition therapy for other medical conditions (including diabetes).  Achieve this pattern by following plans such as the [place] Mediterranean, DASH dietary pattern, or AHA diet.     Engage in 2 hours and 30 minutes per week of moderate-intensity physical activity, or 1 hour and 15 minutes (75 minutes) per week of vigorous-intensity aerobic physical activity, or an equivalent combination of moderate and vigorous-intensity aerobic physical activity. Aerobic activity should be performed in episodes of at least 10 minutes preferably spread throughout the week.     Adhering to a heart healthy diet, regular exercise habits, avoidance of tobacco products, and maintenance of a healthy weight are crucial components of their heart disease risk reduction.     Any positive review of systems not specifically addressed in the office visit today should be evaluated and treated by the patients primary care physician or in an emergency department if necessary     Patient was notified that results from ordered tests will be called to the patient if it changes current management; it will otherwise be discussed at a future appointment and available on  [employer].     Thank you for allowing me to participate in the care of this patient.        This document was generated using the assistance of voice recognition software. If there are any errors of spelling, grammar, syntax, or meaning; please feel free to contact me directly for clarification.    Past Medical History:  She has a past medical history of Anxiety, Arthritis, COPD (chronic obstructive pulmonary disease) (Multi), GERD (gastroesophageal reflux disease), HL (hearing loss), Hypertension, Kidney stones, PONV (postoperative nausea and vomiting), and  Shortness of breath.    Past Surgical History:  She has a past surgical history that includes Thoracic aortic aneurysm repair;  section, classic; and Appendectomy.      Social History:  She reports that she has been smoking cigarettes. She has never used smokeless tobacco. She reports that she does not currently use alcohol. She reports that she does not use drugs.    Family History:  Family History   Problem Relation Name Age of Onset    Heart failure Mother      Other (pulmonary htn) Mother      Pancreatic cancer Father      Nephrolithiasis Sister      Diabetes Brother          Allergies:  Amoxicillin-pot clavulanate, Tetracyclines, Metronidazole, and Sulfa (sulfonamide antibiotics)    Outpatient Medications:  Current Outpatient Medications   Medication Instructions    acetaminophen (Tylenol) 325 mg tablet Every 6 hours PRN    albuterol 90 mcg/actuation inhaler 2 puffs, inhalation, Every 4 hours PRN    Anoro Ellipta 62.5-25 mcg/actuation blister with device 1 puff, inhalation, Daily    busPIRone (BUSPAR) 10 mg, 2 times daily    citalopram (CELEXA) 40 mg, Daily    famotidine (PEPCID) 20 mg, oral, Daily PRN, Once daily as needed.    fluticasone (Flonase) 50 mcg/actuation nasal spray 1 spray, Each Nostril, Daily    HYDROcodone-acetaminophen (Norco) 5-325 mg tablet 1 tablet, oral, Every 6 hours PRN    metoprolol tartrate (Lopressor) 50 mg tablet oral, 2 times daily    montelukast (SINGULAIR) 10 mg, oral, Nightly    nicotine (Nicoderm CQ) 21 mg/24 hr patch 1 patch, transdermal, Every 24 hours    pravastatin (PRAVACHOL) 20 mg, oral, Every evening        ROS:  A 14 point review of systems was done and is negative other than as stated in HPI    Vitals:      2025     3:20 PM 2025     8:37 AM 2025     1:09 PM 2025     1:54 PM 2025     2:55 PM 2025     4:56 PM 2025    10:49 AM   Vitals   Systolic  138 128 134 153 128 114   Diastolic  80 76 82 75 81 56   BP Location  Left arm       "  Heart Rate  77 72 98 96 96 87   Temp 36.7 °C (98 °F) 35.7 °C (96.2 °F) 36.4 °C (97.6 °F) 36.7 °C (98.1 °F) 36.2 °C (97.2 °F)  37.2 °C (98.9 °F)   Resp  16 16 20 18 18 18   Height  1.651 m (5' 5\")   1.651 m (5' 5\")     Weight (lb)  188   180     BMI  31.28 kg/m2   29.95 kg/m2     BSA (m2)  1.98 m2   1.93 m2          Physical Exam:   Constitutional: Cooperative, in no acute distress, alert, appears stated age.  Skin: Skin color, texture, turgor normal. No rashes or lesions.  Head: Normocephalic. No masses, lesions, tenderness or abnormalities  Eyes: Extraocular movements are grossly intact.  Mouth and throat: Mucous membranes moist  Neck: Neck supple, no carotid bruits, no JVD  Respiratory: Lungs clear to auscultation, no wheezing or rhonchi, no use of accessory muscles  Chest wall: Surgical scar, normal excursion with respiration  Cardiovascular: Regular rhythm without murmur, gallop, or rubs  Gastrointestinal: Abdomen soft, nontender. Bowel sounds normal. Obese.  Musculoskeletal: Strength equal in upper extremities  Extremities: No pitting edema. Trivial scarring.  Neurologic: Sensation grossly intact, alert and oriented x3    Intake/Output:   No intake/output data recorded.    Outpatient Medications  Current Outpatient Medications on File Prior to Visit   Medication Sig Dispense Refill    acetaminophen (Tylenol) 325 mg tablet Take by mouth every 6 hours if needed.      albuterol 90 mcg/actuation inhaler Inhale 2 puffs every 4 hours if needed for wheezing. 7 g 0    Anoro Ellipta 62.5-25 mcg/actuation blister with device Inhale 1 puff by mouth once daily 60 each 3    busPIRone (Buspar) 10 mg tablet Take 1 tablet (10 mg) by mouth 2 times a day.      citalopram (CeleXA) 40 mg tablet Take 1 tablet (40 mg) by mouth once daily.      famotidine (Pepcid) 20 mg tablet Take 1 tablet (20 mg) by mouth once daily as needed. Once daily as needed.      fluticasone (Flonase) 50 mcg/actuation nasal spray Administer 1 spray into " each nostril once daily. 16 g 11    HYDROcodone-acetaminophen (Norco) 5-325 mg tablet Take 1 tablet by mouth every 6 hours if needed for severe pain (7 - 10). 20 tablet 0    [] methylPREDNISolone (Medrol Dospak) 4 mg tablets Follow schedule on package instructions 21 tablet 0    metoprolol tartrate (Lopressor) 50 mg tablet Take 1/2 (one-half) tablet by mouth twice daily 90 tablet 1    montelukast (Singulair) 10 mg tablet Take 1 tablet (10 mg) by mouth once daily at bedtime. 30 tablet 11    nicotine (Nicoderm CQ) 21 mg/24 hr patch Place 1 patch over 24 hours on the skin once every 24 hours. 30 patch 0    pravastatin (Pravachol) 20 mg tablet Take 1 tablet (20 mg) by mouth once daily in the evening. 90 tablet 3     No current facility-administered medications on file prior to visit.       Labs: (past 26 weeks)  Recent Results (from the past 26 weeks)   Creatinine    Collection Time: 10/01/24  1:39 PM   Result Value Ref Range    Creatinine 0.52 0.50 - 1.05 mg/dL    eGFR >90 >60 mL/min/1.73m*2   POCT UA Automated manually resulted    Collection Time: 25 10:37 AM   Result Value Ref Range    POC Color, Urine Yellow Straw, Yellow, Light-Yellow    POC Appearance, Urine Clear Clear    POC Glucose, Urine NEGATIVE NEGATIVE mg/dl    POC Bilirubin, Urine NEGATIVE NEGATIVE    POC Ketones, Urine NEGATIVE NEGATIVE mg/dl    POC Specific Gravity, Urine 1.020 1.005 - 1.035    POC Blood, Urine LARGE (3+) (A) NEGATIVE    POC PH, Urine 7.0 No Reference Range Established PH    POC Protein, Urine NEGATIVE NEGATIVE mg/dl    POC Urobilinogen, Urine 0.2 0.2, 1.0 EU/DL    Poc Nitrite, Urine NEGATIVE NEGATIVE    POC Leukocytes, Urine NEGATIVE NEGATIVE   Basic Metabolic Panel    Collection Time: 25 10:50 AM   Result Value Ref Range    Glucose 99 74 - 99 mg/dL    Sodium 137 136 - 145 mmol/L    Potassium 4.0 3.5 - 5.3 mmol/L    Chloride 106 98 - 107 mmol/L    Bicarbonate 27 21 - 32 mmol/L    Anion Gap 8 (L) 10 - 20 mmol/L    Urea  Nitrogen 13 6 - 23 mg/dL    Creatinine 0.53 0.50 - 1.05 mg/dL    eGFR >90 >60 mL/min/1.73m*2    Calcium 8.7 8.6 - 10.3 mg/dL   CBC    Collection Time: 01/09/25 10:50 AM   Result Value Ref Range    WBC 7.7 4.4 - 11.3 x10*3/uL    nRBC 0.0 0.0 - 0.0 /100 WBCs    RBC 5.08 4.00 - 5.20 x10*6/uL    Hemoglobin 14.9 12.0 - 16.0 g/dL    Hematocrit 46.3 (H) 36.0 - 46.0 %    MCV 91 80 - 100 fL    MCH 29.3 26.0 - 34.0 pg    MCHC 32.2 32.0 - 36.0 g/dL    RDW 13.6 11.5 - 14.5 %    Platelets 240 150 - 450 x10*3/uL   Calculi (Stone) Analysis    Collection Time: 01/14/25  2:08 PM   Result Value Ref Range    Calculi Mass 24 mg    Calculi Description See Note     Calculi Composition See Note    Urinalysis with Reflex Culture and Microscopic    Collection Time: 01/18/25  8:50 AM   Result Value Ref Range    Color, Urine Yellow Light-Yellow, Yellow, Dark-Yellow    Appearance, Urine Clear Clear    Specific Gravity, Urine 1.028 1.005 - 1.035    pH, Urine 6.0 5.0, 5.5, 6.0, 6.5, 7.0, 7.5, 8.0    Protein, Urine 30 (1+) (A) NEGATIVE, 10 (TRACE), 20 (TRACE) mg/dL    Glucose, Urine Normal Normal mg/dL    Blood, Urine 0.5 (2+) (A) NEGATIVE    Ketones, Urine NEGATIVE NEGATIVE mg/dL    Bilirubin, Urine NEGATIVE NEGATIVE    Urobilinogen, Urine Normal Normal mg/dL    Nitrite, Urine NEGATIVE NEGATIVE    Leukocyte Esterase, Urine NEGATIVE NEGATIVE   Extra Urine Gray Tube    Collection Time: 01/18/25  8:50 AM   Result Value Ref Range    Extra Tube Hold for add-ons.    Urinalysis Microscopic    Collection Time: 01/18/25  8:50 AM   Result Value Ref Range    WBC, Urine >50 (A) 1-5, NONE /HPF    RBC, Urine >20 (A) NONE, 1-2, 3-5 /HPF    Squamous Epithelial Cells, Urine 1-9 (SPARSE) Reference range not established. /HPF    Mucus, Urine FEW Reference range not established. /LPF   Urine Culture    Collection Time: 01/18/25  8:50 AM    Specimen: Clean Catch/Voided; Urine   Result Value Ref Range    Urine Culture No growth    CBC with Differential    Collection  Time: 01/18/25  9:19 AM   Result Value Ref Range    WBC 16.2 (H) 4.4 - 11.3 x10*3/uL    nRBC 0.0 0.0 - 0.0 /100 WBCs    RBC 5.20 4.00 - 5.20 x10*6/uL    Hemoglobin 15.3 12.0 - 16.0 g/dL    Hematocrit 47.1 (H) 36.0 - 46.0 %    MCV 91 80 - 100 fL    MCH 29.4 26.0 - 34.0 pg    MCHC 32.5 32.0 - 36.0 g/dL    RDW 13.5 11.5 - 14.5 %    Platelets 266 150 - 450 x10*3/uL    Neutrophils % 82.3 40.0 - 80.0 %    Immature Granulocytes %, Automated 0.4 0.0 - 0.9 %    Lymphocytes % 10.2 13.0 - 44.0 %    Monocytes % 5.6 2.0 - 10.0 %    Eosinophils % 1.1 0.0 - 6.0 %    Basophils % 0.4 0.0 - 2.0 %    Neutrophils Absolute 13.34 (H) 1.20 - 7.70 x10*3/uL    Immature Granulocytes Absolute, Automated 0.06 0.00 - 0.70 x10*3/uL    Lymphocytes Absolute 1.65 1.20 - 4.80 x10*3/uL    Monocytes Absolute 0.90 0.10 - 1.00 x10*3/uL    Eosinophils Absolute 0.17 0.00 - 0.70 x10*3/uL    Basophils Absolute 0.07 0.00 - 0.10 x10*3/uL   Comprehensive Metabolic Panel    Collection Time: 01/18/25  9:19 AM   Result Value Ref Range    Glucose 123 (H) 74 - 99 mg/dL    Sodium 140 136 - 145 mmol/L    Potassium 3.8 3.5 - 5.3 mmol/L    Chloride 106 98 - 107 mmol/L    Bicarbonate 26 21 - 32 mmol/L    Anion Gap 12 10 - 20 mmol/L    Urea Nitrogen 14 6 - 23 mg/dL    Creatinine 0.70 0.50 - 1.05 mg/dL    eGFR >90 >60 mL/min/1.73m*2    Calcium 9.2 8.6 - 10.3 mg/dL    Albumin 4.4 3.4 - 5.0 g/dL    Alkaline Phosphatase 86 33 - 110 U/L    Total Protein 6.9 6.4 - 8.2 g/dL    AST 54 (H) 9 - 39 U/L    Bilirubin, Total 0.6 0.0 - 1.2 mg/dL    ALT 82 (H) 7 - 45 U/L   Lactate    Collection Time: 01/18/25  9:50 AM   Result Value Ref Range    Lactate 1.7 0.4 - 2.0 mmol/L   Blood Culture    Collection Time: 01/18/25  9:50 AM    Specimen: Peripheral Venipuncture; Blood culture   Result Value Ref Range    Blood Culture No growth at 4 days -  FINAL REPORT    Blood Culture    Collection Time: 01/18/25  9:50 AM    Specimen: Peripheral Venipuncture; Blood culture   Result Value Ref Range     Blood Culture No growth at 4 days -  FINAL REPORT    POCT BinaxNOW Covid-19 Ag Card manually resulted    Collection Time: 02/24/25 11:06 AM   Result Value Ref Range    POC ELVIN-COV-2 AG  Presumptive negative test for SARS-CoV-2 (no antigen detected)     Presumptive negative test for SARS-CoV-2 (no antigen detected)   POCT Influenza A/B manually resulted    Collection Time: 02/24/25 11:06 AM   Result Value Ref Range    POC Rapid Influenza A Negative Negative    POC Rapid Influenza B Negative Negative       ECG  Encounter Date: 09/09/24   ECG 12 Lead    Narrative    Sinus rhythm possible anterior infarct age undetermined, heart rate 78   bpm.       Echocardiogram  No results found for this or any previous visit from the past 1095 days.      CV Studies:  EKG:  Encounter Date: 09/09/24   ECG 12 Lead    Narrative    Sinus rhythm possible anterior infarct age undetermined, heart rate 78   bpm.     Echocardiogram:   Echocardiogram     Narrative  [place] [id number]  Phone [phone] Fax [phone]    TRANSTHORACIC ECHOCARDIOGRAM REPORT      Patient Name:     BLANCHE MADRIGAL Reading Physician:   [id number] Karel Marshall MD  Study Date:       2/4/2022       Referring Physician: [id number] QUINTANILLA  MRN/PID:          [id number]       PCP:  Accession/Order#: [id number]   Department Location: [facility]  YOB: 1965      Fellow:  Gender:           F              Nurse:  Admit Date:                      Sonographer:         Mahi Mcintosh [facility]  Admission Status: Outpatient     Additional Staff:  Height:           165.10 cm      CC Report to:  Weight:           74.84 kg       Study Type:          Echocardiogram  BSA:              1.82 m2  Blood Pressure: 122 /88 mmHg    Diagnosis/ICD: R07.9-Chest pain, unspecified; I71.2-Thoracic aortic aneurysm,  without rupture  Indication:    Chest Pain  Procedure/CPT: Echo Complete w Full [id number]    Patient  History:  Pertinent History: THORACIC ANEURYSM WITHOUT RUPTURE.    Study Detail: The following Echo studies were performed: M-Mode, 2D, Doppler and  color flow.      PHYSICIAN INTERPRETATION:  Left Ventricle: The left ventricular systolic function is normal, with an estimated ejection fraction of 65-70%. There are no regional wall motion abnormalities. The left ventricular cavity size is normal. Spectral Doppler shows a normal pattern of left ventricular diastolic filling.  Left Atrium: The left atrium is normal in size.  Right Ventricle: The right ventricle is normal in size. There is normal right ventricular global systolic function.  Right Atrium: The right atrium is normal in size.  Aortic Valve: The aortic valve appears structurally normal. There is no evidence of aortic valve regurgitation. The peak instantaneous gradient of the aortic valve is 7.1 mmHg. The mean gradient of the aortic valve is 4.0 mmHg.  Mitral Valve: The mitral valve is normal in structure. There is no evidence of mitral valve regurgitation.  Tricuspid Valve: The tricuspid valve is structurally normal. There is trace tricuspid regurgitation.  Pulmonic Valve: The pulmonic valve is structurally normal. There is no indication of pulmonic valve regurgitation.  Pericardium: There is no pericardial effusion noted.  Aorta: The aortic root is normal.      CONCLUSIONS:  1. The left ventricular systolic function is normal with a 65-70% estimated ejection fraction.    QUANTITATIVE DATA SUMMARY:  2D MEASUREMENTS:  Normal Ranges:  Ao Root d:     2.70 cm   (2.0-3.7cm)  LAs:           2.60 cm   (2.7-4.0cm)  IVSd:          1.00 cm   (0.6-1.1cm)  LVPWd:         0.80 cm   (0.6-1.1cm)  LVIDd:         4.50 cm   (3.9-5.9cm)  LVIDs:         2.65 cm  LV Mass Index: 72.9 g/m2  LV % FS        41.1 %    LA VOLUME:  Normal Ranges:  LA Vol A4C:        25.7 ml    (22+/-6mL/m2)  LA Vol A2C:        25.4 ml  LA Vol BP:         25.9 ml  LA Vol Index A4C:  14.1ml/m2  LA Vol  Index A2C:  13.9 ml/m2  LA Vol Index BP:   14.2 ml/m2  LA Area A4C:       11.0 cm2  LA Area A2C:       10.8 cm2  LA Major Axis A4C: 4.0 cm  LA Major Axis A2C: 3.9 cm  LA Volume Index:   13.2 ml/m2  LA Vol A4C:        23.0 ml  LA Vol A2C:        24.0 ml    RA VOLUME BY A/L METHOD:  Normal Ranges:  RA Area A4C: 10.0 cm2    AORTA MEASUREMENTS:  Normal Ranges:  Ao Sinus, d: 2.70 cm (2.1-3.5cm)  Ao STJ, d:   2.60 cm (1.7-3.4cm)  Asc Ao, d:   4.80 cm (2.1-3.4cm)    LV SYSTOLIC FUNCTION BY 2D PLANIMETRY (MOD):  Normal Ranges:  EF-A4C View: 68.8 % (>55%)  EF-A2C View: 62.3 %  EF-Biplane:  65.8 %    LV DIASTOLIC FUNCTION:  Normal Ranges:  MV Peak E:        0.64 m/s    (0.7-1.2 m/s)  MV Peak A:        0.68 m/s    (0.42-0.7 m/s)  E/A Ratio:        0.94        (1.0-2.2)  MV e'             0.11 m/s    (>8.0)  MV lateral e'     0.13 m/s  MV medial e'      0.08 m/s  MV A Dur:         148.00 msec  E/e' Ratio:       6.07        (<8.0)  PulmV A Revs Dur: 169.00 msec    MITRAL VALVE:  Normal Ranges:  MV DT: 232 msec (150-240msec)    AORTIC VALVE:  Normal Ranges:  AoV Vmax:                1.33 m/s (<1.7m/s)  AoV Peak P.1 mmHg (<20mmHg)  AoV Mean P.0 mmHg (1.7-11.5mmHg)  LVOT Max Ruben:            0.92 m/s (<1.1m/s)  AoV VTI:                 28.40 cm (18-25cm)  LVOT VTI:                18.40 cm  LVOT Diameter:           1.70 cm  (1.8-2.4cm)  AoV Area, VTI:           1.47 cm2 (2.5-5.5cm2)  AoV Area,Vmax:           1.56 cm2 (2.5-4.5cm2)  AoV Dimensionless Index: 0.65    RIGHT VENTRICLE:  RV 1   2.8 cm  RV 2   2.0 cm  RV 3   6.0 cm  TAPSE: 23.0 mm  RV s'  0.13 m/s    TRICUSPID VALVE/RVSP:  Normal Ranges:  Peak TR Velocity: 2.31 m/s  RV Syst Pressure: 26.3 mmHg (< 30mmHg)  TV E Vmax:        0.53 m/s  (0.3-0.7m/s)  TV A Vmax:        0.42 m/s  IVC Diam:         1.20 cm    Pulmonary Veins:  PulmV A Revs Dur: 169.00 msec      [id number] Karel Marshall MD  Electronically signed on 2022 at 11:14:19 AM         Final      [id number]   NM CARDIAC STRESS REST (MYOCARDIAL PERFUSION MIBI) 02/04/2022    Narrative  MRN: [id number]  Patient Name: BLANCHE MADRIGAL    STUDY:  CARDIAC STRESS/REST INJECTION; PART 2 STRESS OR REST (NO CHARGE);  CARDIAC STRESS/REST (MYOCARDIAL PERFUSION/MIBI);  2/4/2022 9:21 am    INDICATION:  Chest Pain  R07.9: Chest pain I71.2: Thoracic aortic aneurysm without  rupture.    COMPARISON:  None.    ACCESSION NUMBER(S):  [id number]; [id number]; [id number]    ORDERING CLINICIAN:  JACKIE QUINTANILLA    TECHNIQUE:  DIVISION OF NUCLEAR MEDICINE  STRESS MYOCARDIAL PERFUSION SCAN, ONE DAY PROTOCOL    The patient received an intravenous dose of  11.6 mCi of Tc-99m  tetrofosmin and resting emission tomographic (SPECT) images of the  myocardium were acquired. The patient then exercised via treadmill  stress to  100 % of MPHR and achieved  6.0 METS. At peak stress  35.9  mCi of Tc-99m tetrofosmin were administered and stress phase SPECT  images of the myocardium were then acquired. These included ECG-gated  images to assess and quantify ventricular function.    FINDINGS:    There is a small fixed perfusion defect in the distal anterior wall  which resolves on prone imaging consistent with breast attenuation.  No reversible perfusion defects seen.    Calculated ejection fraction of 67% without segmental wall motion  abnormality seen.    Impression  1. There is a small fixed perfusion defect in the distal anterior  wall which resolves on prone imaging consistent with breast  attenuation. There is a low probability of ischemia.    2. Calculated ejection fraction of 67% without segmental wall motion  abnormality seen.    Cardiac Catheterization:   Adult Cath     Narrative  [facility], Cath Lab, [address] [place] [address]    Cardiovascular Catheterization Report    Patient Name:     BLANCHE MADRIGAL Performing Physician:  [id number] Antonio Delgado MD  Study Date:       5/24/2023      Verifying  Physician:   [id number] Antonio Gore MD  MRN/PID:          [id number]       Cardiologist/Co-scrub:  Accession/Order#: [id number]      Fellow:                Nery Cook MD  YOB: 1965      Fellow:  Gender:           F              Referring Physician:   ANTONIO GORE  Admit Date:                      Referring Physician:   [id number] Conrad Dior MD  Surgeon:                         Referring Physician:   [id number] Conrad Dior MD      Study: Left Heart Catheterization      Indications:  BLANCHE MADRIGAL is a 58 year old female who presents with tobacco Use - former. Surgical risk clearance low, with an asymptomatic chest pain assessment. Study performed as an elective cath procedure.    Medical History:  Stress test performed: No. CTA performed: No. [place]atston accessed: No. LVEF Assessed: Yes. LVEF = 50%.    Procedure Description:  After infiltration with 2% Lidocaine, the right radial artery was cannulated with a modified Seldinger technique. Subsequently a 6 [place] sheath was placed in the right radial artery. Selective coronary catheterization was performed using a 5 Fr catheter(s) exchanged over a guide wire to cannulate the coronary arteries. A JL 5 tip catheter was used for left coronary injections. A JR 5 tip catheter was used for right coronary injections.  Multiple injections of contrast were made into the left and right coronary arteries with angiograms recorded in multiple projections. After completion of the procedure, the arterial sheath was pulled and a TR Band Radial Compression Device was utilized to obtain patent hemostasis.    Coronary Angiography:  The coronary circulation is co-dominant.    Left Main Coronary Artery:  The left main coronary artery is a normal caliber vessel. The left main arises normally from the left coronary sinus of Valsalva and bifurcates into the LAD and circumflex coronary arteries. The left main coronary artery showed no significant disease or stenosis greater than  30%.    Left Anterior Descending Coronary Artery Distribution:  The left anterior descending coronary artery is a normal caliber vessel. The LAD arises normally from the left main coronary artery. The LAD demonstrated no significant disease or stenosis greater than 30%. The 1st diagonal branch showed no significant disease or stenosis greater than 30%. The 2nd diagonal branch demonstrated no significant disease or stenosis greater than 30%.    Circumflex Coronary Artery Distribution:  The circumflex coronary artery is a large caliber vessel. The circumflex arises normally from the left main coronary artery and terminates in the AV groove. The circumflex revealed no significant disease or stenosis greater than 30%. The 1st obtuse marginal branch showed no significant disease or stenosis greater than 30%. The left posterolateral branch showed no significant disease or stenosis greater than 30%. The left posterior descending artery showed no significant disease or stenosis greater than 30%.    Ramus Intermedius:  The ramus intermedius arises normally from the left main coronary artery. The ramus intermedius showed no significant disease or stenosis greater than 30%.    Right Coronary Artery Distribution:    The right coronary artery is a medium-sized caliber vessel. The RCA arises normally from the right sinus of Valsalva. The RCA showed no significant disease or stenosis greater than 30%. The acute marginal branch showed no significant disease or stenosis greater than 30%.  The right posterior descending artery showed no significant disease or stenosis greater than 30%.          Cardiac Cath Transition of Care Summary:  Post Procedure Diagnosis: No Significatn coronary artery disease.  Findings:                 Normal coronary arteries.  Blood Loss:               Estimated blood loss during the procedure was 3cc mls.  Specimens Removed:        Number of specimen(s) removed:  none.    ____________________________________________________________________________________  CONCLUSIONS:  1. Angiographically unremarkable coronaries in a co-dominant system.    ____________________________________________________________________________________  CPT Codes:  Left Heart Cath (visualization of coronaries) and [id number]; Moderate Sedation Services initial 15 minutes patient >5 [id number]    ICD 10 Codes:  I71.21-Aneurysm of the ascending aorta, without rupture    [id number] Antonio Delgado MD  Performing Physician  Electronically signed by [id number] Antonio Delgado MD on 5/24/2023 at 11:46:03 AM      cc Report to: ANTONIO DELGADO    cc Report to: [id number] Conrad Dior MD    cc Report to: [id number] Conrad Dior MD           Final   No results found for this or any previous visit from the past 3650 days.     Cardiac Scoring: No results found for this or any previous visit from the past 1825 days.    AAA : No results found for this or any previous visit from the past 1825 days.    OTHER: No results found for this or any previous visit from the past 1825 days.    LAST IMAGING RESULTS  Point of Care Ultrasound  Joaquim Carbajal MD     2/6/2025  5:29 PM    Performed by: Joaquim Carbajal MD  Authorized by: Bacilio Santillan DO      Ocular Indications: vision loss    Procedure: Ocular Ultrasound    Findings:  Vitreous: The vitreous was identified and NO abnormalities were   visualized.  Retina: The retina was visualized and there was NO retinal detachment.  L Optic Nerve: The LEFT optic nerve was identified and was LESS than 5 mm.  R Optic Nerve: The RIGHT optic nerve was identified and was LESS than 5   mm.    Impression:  Ocular: The ocular US exam was NEGATIVE for abnormalities as described.      Problem List Items Addressed This Visit       Status post thoracic aortic aneurysm repair    Dyslipidemia    History of tobacco use    Obesity (BMI 30-39.9)    Palpitations    Amaurosis fugax - Primary          Marty Redd DO, FACC, FACOI

## 2025-03-12 ENCOUNTER — OFFICE VISIT (OUTPATIENT)
Dept: CARDIOLOGY | Facility: HOSPITAL | Age: 60
End: 2025-03-12
Payer: COMMERCIAL

## 2025-03-12 VITALS
WEIGHT: 180 LBS | HEIGHT: 65 IN | BODY MASS INDEX: 29.99 KG/M2 | DIASTOLIC BLOOD PRESSURE: 78 MMHG | HEART RATE: 75 BPM | SYSTOLIC BLOOD PRESSURE: 114 MMHG

## 2025-03-12 DIAGNOSIS — E78.5 DYSLIPIDEMIA: ICD-10-CM

## 2025-03-12 DIAGNOSIS — E66.9 OBESITY (BMI 30-39.9): ICD-10-CM

## 2025-03-12 DIAGNOSIS — R05.3 CHRONIC COUGH: ICD-10-CM

## 2025-03-12 DIAGNOSIS — R07.9 CHEST PAIN, UNSPECIFIED TYPE: ICD-10-CM

## 2025-03-12 DIAGNOSIS — I95.1 ORTHOSTASIS: ICD-10-CM

## 2025-03-12 DIAGNOSIS — R42 DIZZINESS: ICD-10-CM

## 2025-03-12 DIAGNOSIS — Z98.890 STATUS POST THORACIC AORTIC ANEURYSM REPAIR: ICD-10-CM

## 2025-03-12 DIAGNOSIS — Z86.79 STATUS POST THORACIC AORTIC ANEURYSM REPAIR: ICD-10-CM

## 2025-03-12 DIAGNOSIS — Z87.891 HISTORY OF TOBACCO USE: ICD-10-CM

## 2025-03-12 DIAGNOSIS — G45.3 AMAUROSIS FUGAX: Primary | ICD-10-CM

## 2025-03-12 DIAGNOSIS — R00.2 PALPITATIONS: ICD-10-CM

## 2025-03-12 LAB
ATRIAL RATE: 75 BPM
P AXIS: 63 DEGREES
P OFFSET: 207 MS
P ONSET: 144 MS
PR INTERVAL: 152 MS
Q ONSET: 220 MS
QRS COUNT: 12 BEATS
QRS DURATION: 88 MS
QT INTERVAL: 404 MS
QTC CALCULATION(BAZETT): 451 MS
QTC FREDERICIA: 434 MS
R AXIS: 70 DEGREES
T AXIS: 85 DEGREES
T OFFSET: 422 MS
VENTRICULAR RATE: 75 BPM

## 2025-03-12 PROCEDURE — 99214 OFFICE O/P EST MOD 30 MIN: CPT | Performed by: INTERNAL MEDICINE

## 2025-03-12 PROCEDURE — 93005 ELECTROCARDIOGRAM TRACING: CPT | Performed by: INTERNAL MEDICINE

## 2025-03-12 PROCEDURE — 3008F BODY MASS INDEX DOCD: CPT | Performed by: INTERNAL MEDICINE

## 2025-03-12 PROCEDURE — 99214 OFFICE O/P EST MOD 30 MIN: CPT | Mod: 25 | Performed by: INTERNAL MEDICINE

## 2025-03-12 PROCEDURE — 93010 ELECTROCARDIOGRAM REPORT: CPT | Performed by: INTERNAL MEDICINE

## 2025-03-13 DIAGNOSIS — E78.5 DYSLIPIDEMIA: ICD-10-CM

## 2025-03-13 DIAGNOSIS — E66.9 OBESITY (BMI 30-39.9): ICD-10-CM

## 2025-03-13 DIAGNOSIS — I95.1 ORTHOSTASIS: ICD-10-CM

## 2025-03-13 DIAGNOSIS — Z87.891 HISTORY OF TOBACCO USE: ICD-10-CM

## 2025-03-13 DIAGNOSIS — R00.2 PALPITATIONS: ICD-10-CM

## 2025-03-13 DIAGNOSIS — Z98.890 STATUS POST THORACIC AORTIC ANEURYSM REPAIR: ICD-10-CM

## 2025-03-13 DIAGNOSIS — R05.3 CHRONIC COUGH: ICD-10-CM

## 2025-03-13 DIAGNOSIS — Z86.79 STATUS POST THORACIC AORTIC ANEURYSM REPAIR: ICD-10-CM

## 2025-03-13 DIAGNOSIS — R07.9 CHEST PAIN, UNSPECIFIED TYPE: ICD-10-CM

## 2025-03-13 DIAGNOSIS — R42 DIZZINESS: ICD-10-CM

## 2025-03-13 RX ORDER — PRAVASTATIN SODIUM 20 MG/1
20 TABLET ORAL EVERY EVENING
Qty: 90 TABLET | Refills: 0 | Status: SHIPPED | OUTPATIENT
Start: 2025-03-13

## 2025-03-13 RX ORDER — METOPROLOL TARTRATE 25 MG/1
25 TABLET, FILM COATED ORAL 2 TIMES DAILY
Qty: 180 TABLET | Refills: 3 | Status: SHIPPED | OUTPATIENT
Start: 2025-03-13 | End: 2026-03-13

## 2025-03-13 NOTE — TELEPHONE ENCOUNTER
Called patient to ask if she would prefer 25 mg tablets of metoprolol so she wouldn't need to cut the 50 mg tablets to make the 25 mg dose.  She would appreciate that.  Script updated and routed.

## 2025-04-01 ENCOUNTER — HOSPITAL ENCOUNTER (OUTPATIENT)
Dept: CARDIOLOGY | Facility: HOSPITAL | Age: 60
Discharge: HOME | End: 2025-04-01
Payer: COMMERCIAL

## 2025-04-01 ENCOUNTER — HOSPITAL ENCOUNTER (OUTPATIENT)
Dept: VASCULAR MEDICINE | Facility: HOSPITAL | Age: 60
Discharge: HOME | End: 2025-04-01
Payer: COMMERCIAL

## 2025-04-01 DIAGNOSIS — Z98.890 STATUS POST THORACIC AORTIC ANEURYSM REPAIR: ICD-10-CM

## 2025-04-01 DIAGNOSIS — G45.3 AMAUROSIS FUGAX: ICD-10-CM

## 2025-04-01 DIAGNOSIS — R09.89 OTHER SPECIFIED SYMPTOMS AND SIGNS INVOLVING THE CIRCULATORY AND RESPIRATORY SYSTEMS: ICD-10-CM

## 2025-04-01 DIAGNOSIS — R00.2 PALPITATIONS: ICD-10-CM

## 2025-04-01 DIAGNOSIS — Z86.79 STATUS POST THORACIC AORTIC ANEURYSM REPAIR: ICD-10-CM

## 2025-04-01 DIAGNOSIS — R07.9 CHEST PAIN, UNSPECIFIED TYPE: ICD-10-CM

## 2025-04-01 DIAGNOSIS — Z87.891 HISTORY OF TOBACCO USE: ICD-10-CM

## 2025-04-01 DIAGNOSIS — R05.3 CHRONIC COUGH: ICD-10-CM

## 2025-04-01 DIAGNOSIS — E78.5 DYSLIPIDEMIA: ICD-10-CM

## 2025-04-01 DIAGNOSIS — E66.9 OBESITY (BMI 30-39.9): ICD-10-CM

## 2025-04-01 DIAGNOSIS — I95.1 ORTHOSTASIS: ICD-10-CM

## 2025-04-01 DIAGNOSIS — R42 DIZZINESS: ICD-10-CM

## 2025-04-01 PROCEDURE — 93880 EXTRACRANIAL BILAT STUDY: CPT | Performed by: SURGERY

## 2025-04-01 PROCEDURE — 93306 TTE W/DOPPLER COMPLETE: CPT | Performed by: INTERNAL MEDICINE

## 2025-04-01 PROCEDURE — 93880 EXTRACRANIAL BILAT STUDY: CPT

## 2025-04-01 PROCEDURE — 93356 MYOCRD STRAIN IMG SPCKL TRCK: CPT | Performed by: INTERNAL MEDICINE

## 2025-04-01 PROCEDURE — 93356 MYOCRD STRAIN IMG SPCKL TRCK: CPT

## 2025-04-02 ENCOUNTER — TELEPHONE (OUTPATIENT)
Dept: CARDIOLOGY | Facility: HOSPITAL | Age: 60
End: 2025-04-02
Payer: COMMERCIAL

## 2025-04-02 LAB
AORTIC VALVE MEAN GRADIENT: 3 MMHG
AORTIC VALVE PEAK VELOCITY: 1.18 M/S
AV PEAK GRADIENT: 6 MMHG
AVA (PEAK VEL): 2.66 CM2
AVA (VTI): 2.68 CM2
EJECTION FRACTION APICAL 4 CHAMBER: 53.5
EJECTION FRACTION: 56 %
GLOBAL LONGITUDINAL STRAIN: 15.7 %
LEFT ATRIUM VOLUME AREA LENGTH INDEX BSA: 17.2 ML/M2
LEFT VENTRICLE INTERNAL DIMENSION DIASTOLE: 4.46 CM (ref 3.5–6)
LEFT VENTRICULAR OUTFLOW TRACT DIAMETER: 1.97 CM
LV EJECTION FRACTION BIPLANE: 56 %
MITRAL VALVE E/A RATIO: 1.31
RIGHT VENTRICLE FREE WALL PEAK S': 9.72 CM/S
RIGHT VENTRICLE PEAK SYSTOLIC PRESSURE: 24.6 MMHG
TRICUSPID ANNULAR PLANE SYSTOLIC EXCURSION: 1.6 CM

## 2025-04-02 NOTE — TELEPHONE ENCOUNTER
4/2/25  0945  Called echocardiogram results to patient with patient verbalizing understanding.        ----- Message from Marty Redd sent at 4/2/2025  9:11 AM EDT -----  Echocardiogram shows normal left ventricular systolic function with an ejection fraction of 56%, normal right ventricular systolic function, no significant valve abnormalities.  
well nourished

## 2025-04-04 ENCOUNTER — TELEPHONE (OUTPATIENT)
Dept: CARDIOLOGY | Facility: HOSPITAL | Age: 60
End: 2025-04-04
Payer: COMMERCIAL

## 2025-04-04 NOTE — TELEPHONE ENCOUNTER
4/4/25  0934  Called patient; no answer. Left brief voice message for patient giving carotid ultrasound results and to return call if questions.      ----- Message from Marty Redd sent at 4/4/2025  8:35 AM EDT -----  Carotid ultrasound with less than 50% bilateral ICA stenosis.

## 2025-04-17 NOTE — PROGRESS NOTES
[facility]    Patient Name: Cassidy Arnold  Patient : 1965    Scribe Attestation  By signing my name below, Eva EDGAR Scribe attest that this documentation has been prepared under the direction and in the presence of Marty Redd DO.    Scribe Attestation  By signing my name below, Pili EDGAR Scribe attest that this documentation has been prepared under the direction and in the presence of Marty Redd DO.     Physician Attestation  Marty EDGAR DO, personally performed the services described in the documentation as scribed by Pili Saldana in my presence, and confirm it is both accurate and complete.    Reason for visit:  This is a 59-year-old female here for follow-up regarding amaurosis fugax, thoracic ascending aortic aneurysm, palpitations, dyslipidemia, history of tobacco use, and obesity.     HPI:  This is a 59-year-old female here for follow-up regarding amaurosis fugax, thoracic ascending aortic aneurysm, palpitations, dyslipidemia, history of tobacco use, and obesity.  The patient was last evaluated by me in 2025.  At that visit I ordered a carotid ultrasound, echocardiogram, and asked the patient to follow-up in 1 month and sooner if necessary.  Carotid ultrasound done 2025 showed less than 50% bilateral ICA stenosis.  Echocardiogram done 2025 showed normal left ventricular systolic function with an ejection fraction of 56%, normal right ventricular systolic function, no significant valve abnormalities. ECG done today showed sinus rhythm with a heart rate of 70 bpm and possible anterior infarct, age undetermined.  The patient reports that she has been feeling generally well from the cardiac standpoint.  She denies any new chest pain, shortness of breath, palpitations and lightheadedness.  She states that she takes all of her medications as prescribed.  She reports she recently started smoking again.  She has an  appointment with Dr. Che on 5/13/25 where she plans to discuss this further.  During my exam, she was resting comfortably on the exam table.            Assessment/Plan:   Amaurosis Fugax  The patient has a history of amaurosis fugax.  Patient reports no new events.  Carotid ultrasound done 4/1/2025 showed less than 50% bilateral ICA stenosis.    Echocardiogram done 4/1/2025 showed normal left ventricular systolic function with an ejection fraction of 56%, normal right ventricular systolic function, no significant valve abnormalities.  Patient should continue to follow with Ophthalmology.  Follow up in 6 months and sooner if necessary.      2. Thoracic ascending aortic aneurysm  The patient has a history of thoracic ascending aortic aneurysm and underwent replacement of the ascending aorta with hemiarch using a 30 cm Gelweave vascular graft in July 2023.   Intraoperative TANIA done 7/7/2023 showed normal left ventricular systolic function, normal right ventricular systolic function, no significant valve abnormalities.   Continue risk factor modification.      3. Palpitations  The patient had a history of palpitations which had improved since the last visit.  ECG done today showed sinus rhythm with a heart rate of 70 bpm and possible anterior infarct, age undetermined.  She denies chest pain, palpitations or lightheadedness.   She should continue metoprolol for heart rate control.   Echocardiogram done 4/1/2025 showed normal left ventricular systolic function with an ejection fraction of 56%, normal right ventricular systolic function, no significant valve abnormalities.  Lab works as noted below will be done in 3 months prior to his next visit.   Patient should follow general recommendations including avoiding excessive alcohol intake, avoiding excessive caffeine intake, staying well-hydrated, getting an appropriate amount of sleep.  Follow up in 6 months and sooner if necessary.      4. Dyslipidemia  Lipid panel  done in June 2024 showed an LDL cholesterol of 82 and triglycerides of 65 while on pravastatin 20 mg daily.  Lipid panel will be updated in 3 months.  Please see lifestyle recommendations below.      5. Tobacco use   The patient recently started smoking again and is interested in quitting.   I will refer the patient for tobacco cessation counseling.       6. Obesity  Please see lifestyle recommendations below.         Orders:   CMP/lipid/magnesium/CBC in 3 months,   Referral to  smoking cessation program,   Follow-up in 6 months.    Lifestyle Recommendations  I recommend a whole-food plant-based diet, an eating pattern that encourages the consumption of unrefined plant foods (such as fruits, vegetables, tubers, whole grains, legumes, nuts and seeds) and discourages meats, dairy products, eggs and processed foods.     The AHA/ACC recommends that the patient consume a dietary pattern that emphasizes intake of vegetables, fruits, and whole grains; includes low-fat dairy products, poultry, fish, legumes, non-tropical vegetable oils, and nuts; and limits intake of sodium, sweets, sugar-sweetened beverages, and red meats.  Adapt this dietary pattern to appropriate calorie requirements (a 500-750 kcal/day deficit to loose weight), personal and cultural food preferences, and nutrition therapy for other medical conditions (including diabetes).  Achieve this pattern by following plans such as the [place] Mediterranean, DASH dietary pattern, or AHA diet.     Engage in 2 hours and 30 minutes per week of moderate-intensity physical activity, or 1 hour and 15 minutes (75 minutes) per week of vigorous-intensity aerobic physical activity, or an equivalent combination of moderate and vigorous-intensity aerobic physical activity. Aerobic activity should be performed in episodes of at least 10 minutes preferably spread throughout the week.     Adhering to a heart healthy diet, regular exercise habits, avoidance of tobacco products,  and maintenance of a healthy weight are crucial components of their heart disease risk reduction.     Any positive review of systems not specifically addressed in the office visit today should be evaluated and treated by the patients primary care physician or in an emergency department if necessary     Patient was notified that results from ordered tests will be called to the patient if it changes current management; it will otherwise be discussed at a future appointment and available on [facility].     Thank you for allowing me to participate in the care of this patient.        This document was generated using the assistance of voice recognition software. If there are any errors of spelling, grammar, syntax, or meaning; please feel free to contact me directly for clarification.    Past Medical History:  She has a past medical history of Anxiety, Arthritis, COPD (chronic obstructive pulmonary disease) (Multi), GERD (gastroesophageal reflux disease), HL (hearing loss), Hypertension, Kidney stones, PONV (postoperative nausea and vomiting), and Shortness of breath.    Past Surgical History:  She has a past surgical history that includes Thoracic aortic aneurysm repair;  section, classic; and Appendectomy.      Social History:  She reports that she has quit smoking. Her smoking use included cigarettes. She started smoking about 8 weeks ago. She has been exposed to tobacco smoke. She has never used smokeless tobacco. She reports that she does not currently use alcohol. She reports that she does not use drugs.    Family History:  Family History  Problem Relation Name Age of Onset    Heart failure Mother      Other (pulmonary htn) Mother      Pancreatic cancer Father      Nephrolithiasis Sister      Diabetes Brother          Allergies:  Amoxicillin-pot clavulanate, Tetracyclines, Metronidazole, and Sulfa (sulfonamide antibiotics)    Outpatient Medications:  Current Outpatient Medications   Medication Instructions  "   acetaminophen (Tylenol) 325 mg tablet Every 6 hours PRN    albuterol 90 mcg/actuation inhaler 2 puffs, inhalation, Every 4 hours PRN    Anoro Ellipta 62.5-25 mcg/actuation blister with device 1 puff, inhalation, Daily    busPIRone (BUSPAR) 10 mg, 2 times daily    citalopram (CELEXA) 40 mg, Daily    famotidine (PEPCID) 20 mg, Daily PRN    fluticasone (Flonase) 50 mcg/actuation nasal spray 1 spray, Each Nostril, Daily    HYDROcodone-acetaminophen (Norco) 5-325 mg tablet 1 tablet, oral, Every 6 hours PRN    metoprolol tartrate (LOPRESSOR) 25 mg, oral, 2 times daily    montelukast (SINGULAIR) 10 mg, oral, Nightly    nicotine (Nicoderm CQ) 21 mg/24 hr patch 1 patch, transdermal, Every 24 hours    pravastatin (PRAVACHOL) 20 mg, oral, Every evening        ROS:  A 14 point review of systems was done and is negative other than as stated in HPI    Vitals:      1/18/2025     8:37 AM 1/18/2025     1:09 PM 2/6/2025     1:54 PM 2/6/2025     2:55 PM 2/6/2025     4:56 PM 2/24/2025    10:49 AM 3/12/2025     2:28 PM   Vitals   Systolic 138 128 134 153 128 114 114   Diastolic 80 76 82 75 81 56 78   BP Location Left arm      Left arm   Heart Rate 77 72 98 96 96 87 75   Temp 35.7 °C (96.2 °F) 36.4 °C (97.6 °F) 36.7 °C (98.1 °F) 36.2 °C (97.2 °F)  37.2 °C (98.9 °F)    Resp 16 16 20 18 18 18    Height 1.651 m (5' 5\")   1.651 m (5' 5\")   1.651 m (5' 5\")   Weight (lb) 188   180   180   BMI 31.28 kg/m2   29.95 kg/m2   29.95 kg/m2   BSA (m2) 1.98 m2   1.93 m2   1.93 m2        Physical Exam:   Constitutional: Cooperative, in no acute distress, alert, appears stated age.  Skin: Skin color, texture, turgor normal. No rashes or lesions.  Head: Normocephalic. No masses, lesions, tenderness or abnormalities  Eyes: Extraocular movements are grossly intact.  Mouth and throat: Mucous membranes moist  Neck: Neck supple, no carotid bruits, no JVD  Respiratory: Lungs clear to auscultation, no wheezing or rhonchi, no use of accessory muscles  Chest " wall: Surgical scar, normal excursion with respiration  Cardiovascular: Regular rhythm without murmur, gallop, or rubs  Gastrointestinal: Abdomen soft, nontender. Bowel sounds normal. Obese.  Musculoskeletal: Strength equal in upper extremities  Extremities: No pitting edema. Trivial scarring.  Neurologic: Sensation grossly intact, alert and oriented x3        Intake/Output:   No intake/output data recorded.    Outpatient Medications  Current Outpatient Medications on File Prior to Visit   Medication Sig Dispense Refill    acetaminophen (Tylenol) 325 mg tablet Take by mouth every 6 hours if needed.      albuterol 90 mcg/actuation inhaler Inhale 2 puffs every 4 hours if needed for wheezing. 7 g 0    Anoro Ellipta 62.5-25 mcg/actuation blister with device Inhale 1 puff by mouth once daily 60 each 3    busPIRone (Buspar) 10 mg tablet Take 1 tablet (10 mg) by mouth 2 times a day.      citalopram (CeleXA) 40 mg tablet Take 1 tablet (40 mg) by mouth once daily.      famotidine (Pepcid) 20 mg tablet Take 1 tablet (20 mg) by mouth once daily as needed. Once daily as needed.      fluticasone (Flonase) 50 mcg/actuation nasal spray Administer 1 spray into each nostril once daily. 16 g 11    HYDROcodone-acetaminophen (Norco) 5-325 mg tablet Take 1 tablet by mouth every 6 hours if needed for severe pain (7 - 10). 20 tablet 0    metoprolol tartrate (Lopressor) 25 mg tablet Take 1 tablet by mouth 2 times a day. 180 tablet 3    montelukast (Singulair) 10 mg tablet Take 1 tablet (10 mg) by mouth once daily at bedtime. 30 tablet 11    nicotine (Nicoderm CQ) 21 mg/24 hr patch Place 1 patch over 24 hours on the skin once every 24 hours. 30 patch 0    pravastatin (Pravachol) 20 mg tablet Take 1 tablet (20 mg) by mouth once daily in the evening. 90 tablet 0     No current facility-administered medications on file prior to visit.       Labs: (past 26 weeks)  Recent Results (from the past 26 weeks)   POCT UA Automated manually resulted     Collection Time: 01/03/25 10:37 AM   Result Value Ref Range    POC Color, Urine Yellow Straw, Yellow, Light-Yellow    POC Appearance, Urine Clear Clear    POC Glucose, Urine NEGATIVE NEGATIVE mg/dl    POC Bilirubin, Urine NEGATIVE NEGATIVE    POC Ketones, Urine NEGATIVE NEGATIVE mg/dl    POC Specific Gravity, Urine 1.020 1.005 - 1.035    POC Blood, Urine LARGE (3+) (A) NEGATIVE    POC PH, Urine 7.0 No Reference Range Established PH    POC Protein, Urine NEGATIVE NEGATIVE mg/dl    POC Urobilinogen, Urine 0.2 0.2, 1.0 EU/DL    Poc Nitrite, Urine NEGATIVE NEGATIVE    POC Leukocytes, Urine NEGATIVE NEGATIVE   Basic Metabolic Panel    Collection Time: 01/09/25 10:50 AM   Result Value Ref Range    Glucose 99 74 - 99 mg/dL    Sodium 137 136 - 145 mmol/L    Potassium 4.0 3.5 - 5.3 mmol/L    Chloride 106 98 - 107 mmol/L    Bicarbonate 27 21 - 32 mmol/L    Anion Gap 8 (L) 10 - 20 mmol/L    Urea Nitrogen 13 6 - 23 mg/dL    Creatinine 0.53 0.50 - 1.05 mg/dL    eGFR >90 >60 mL/min/1.73m*2    Calcium 8.7 8.6 - 10.3 mg/dL   CBC    Collection Time: 01/09/25 10:50 AM   Result Value Ref Range    WBC 7.7 4.4 - 11.3 x10*3/uL    nRBC 0.0 0.0 - 0.0 /100 WBCs    RBC 5.08 4.00 - 5.20 x10*6/uL    Hemoglobin 14.9 12.0 - 16.0 g/dL    Hematocrit 46.3 (H) 36.0 - 46.0 %    MCV 91 80 - 100 fL    MCH 29.3 26.0 - 34.0 pg    MCHC 32.2 32.0 - 36.0 g/dL    RDW 13.6 11.5 - 14.5 %    Platelets 240 150 - 450 x10*3/uL   Calculi (Stone) Analysis    Collection Time: 01/14/25  2:08 PM   Result Value Ref Range    Calculi Mass 24 mg    Calculi Description See Note     Calculi Composition See Note    Urinalysis with Reflex Culture and Microscopic    Collection Time: 01/18/25  8:50 AM   Result Value Ref Range    Color, Urine Yellow Light-Yellow, Yellow, Dark-Yellow    Appearance, Urine Clear Clear    Specific Gravity, Urine 1.028 1.005 - 1.035    pH, Urine 6.0 5.0, 5.5, 6.0, 6.5, 7.0, 7.5, 8.0    Protein, Urine 30 (1+) (A) NEGATIVE, 10 (TRACE), 20 (TRACE) mg/dL     Glucose, Urine Normal Normal mg/dL    Blood, Urine 0.5 (2+) (A) NEGATIVE    Ketones, Urine NEGATIVE NEGATIVE mg/dL    Bilirubin, Urine NEGATIVE NEGATIVE    Urobilinogen, Urine Normal Normal mg/dL    Nitrite, Urine NEGATIVE NEGATIVE    Leukocyte Esterase, Urine NEGATIVE NEGATIVE   Extra Urine Gray Tube    Collection Time: 01/18/25  8:50 AM   Result Value Ref Range    Extra Tube Hold for add-ons.    Urinalysis Microscopic    Collection Time: 01/18/25  8:50 AM   Result Value Ref Range    WBC, Urine >50 (A) 1-5, NONE /HPF    RBC, Urine >20 (A) NONE, 1-2, 3-5 /HPF    Squamous Epithelial Cells, Urine 1-9 (SPARSE) Reference range not established. /HPF    Mucus, Urine FEW Reference range not established. /LPF   Urine Culture    Collection Time: 01/18/25  8:50 AM    Specimen: Clean Catch/Voided; Urine   Result Value Ref Range    Urine Culture No growth    CBC with Differential    Collection Time: 01/18/25  9:19 AM   Result Value Ref Range    WBC 16.2 (H) 4.4 - 11.3 x10*3/uL    nRBC 0.0 0.0 - 0.0 /100 WBCs    RBC 5.20 4.00 - 5.20 x10*6/uL    Hemoglobin 15.3 12.0 - 16.0 g/dL    Hematocrit 47.1 (H) 36.0 - 46.0 %    MCV 91 80 - 100 fL    MCH 29.4 26.0 - 34.0 pg    MCHC 32.5 32.0 - 36.0 g/dL    RDW 13.5 11.5 - 14.5 %    Platelets 266 150 - 450 x10*3/uL    Neutrophils % 82.3 40.0 - 80.0 %    Immature Granulocytes %, Automated 0.4 0.0 - 0.9 %    Lymphocytes % 10.2 13.0 - 44.0 %    Monocytes % 5.6 2.0 - 10.0 %    Eosinophils % 1.1 0.0 - 6.0 %    Basophils % 0.4 0.0 - 2.0 %    Neutrophils Absolute 13.34 (H) 1.20 - 7.70 x10*3/uL    Immature Granulocytes Absolute, Automated 0.06 0.00 - 0.70 x10*3/uL    Lymphocytes Absolute 1.65 1.20 - 4.80 x10*3/uL    Monocytes Absolute 0.90 0.10 - 1.00 x10*3/uL    Eosinophils Absolute 0.17 0.00 - 0.70 x10*3/uL    Basophils Absolute 0.07 0.00 - 0.10 x10*3/uL   Comprehensive Metabolic Panel    Collection Time: 01/18/25  9:19 AM   Result Value Ref Range    Glucose 123 (H) 74 - 99 mg/dL    Sodium 140  136 - 145 mmol/L    Potassium 3.8 3.5 - 5.3 mmol/L    Chloride 106 98 - 107 mmol/L    Bicarbonate 26 21 - 32 mmol/L    Anion Gap 12 10 - 20 mmol/L    Urea Nitrogen 14 6 - 23 mg/dL    Creatinine 0.70 0.50 - 1.05 mg/dL    eGFR >90 >60 mL/min/1.73m*2    Calcium 9.2 8.6 - 10.3 mg/dL    Albumin 4.4 3.4 - 5.0 g/dL    Alkaline Phosphatase 86 33 - 110 U/L    Total Protein 6.9 6.4 - 8.2 g/dL    AST 54 (H) 9 - 39 U/L    Bilirubin, Total 0.6 0.0 - 1.2 mg/dL    ALT 82 (H) 7 - 45 U/L   Lactate    Collection Time: 01/18/25  9:50 AM   Result Value Ref Range    Lactate 1.7 0.4 - 2.0 mmol/L   Blood Culture    Collection Time: 01/18/25  9:50 AM    Specimen: Peripheral Venipuncture; Blood culture   Result Value Ref Range    Blood Culture No growth at 4 days -  FINAL REPORT    Blood Culture    Collection Time: 01/18/25  9:50 AM    Specimen: Peripheral Venipuncture; Blood culture   Result Value Ref Range    Blood Culture No growth at 4 days -  FINAL REPORT    POCT BinaxNOW Covid-19 Ag Card manually resulted    Collection Time: 02/24/25 11:06 AM   Result Value Ref Range    POC ELVIN-COV-2 AG  Presumptive negative test for SARS-CoV-2 (no antigen detected)     Presumptive negative test for SARS-CoV-2 (no antigen detected)   POCT Influenza A/B manually resulted    Collection Time: 02/24/25 11:06 AM   Result Value Ref Range    POC Rapid Influenza A Negative Negative    POC Rapid Influenza B Negative Negative   ECG 12 lead (Clinic Performed)    Collection Time: 03/12/25  2:30 PM   Result Value Ref Range    Ventricular Rate 75 BPM    Atrial Rate 75 BPM    LA Interval 152 ms    QRS Duration 88 ms    QT Interval 404 ms    QTC Calculation(Bazett) 451 ms    P Axis 63 degrees    R Axis 70 degrees    T Axis 85 degrees    QRS Count 12 beats    Q Onset 220 ms    P Onset 144 ms    P Offset 207 ms    T Offset 422 ms    QTC Fredericia 434 ms   Transthoracic Echo (TTE) Complete    Collection Time: 04/01/25 10:00 AM   Result Value Ref Range    LVOT diam 1.97  cm    LV Biplane EF 56 %    MV E/A ratio 1.31     Tricuspid annular plane systolic excursion 1.6 cm    AV mn grad 3 mmHg    LA vol index A/L 17.2 ml/m2    AV pk zenaida 1.18 m/s    LV EF 56 %    RV free wall pk S' 9.72 cm/s    LV GLS 15.7 %    RVSP 24.6 mmHg    LVIDd 4.46 cm    Aortic Valve Area by Continuity of VTI 2.68 cm2    Aortic Valve Area by Continuity of Peak Velocity 2.66 cm2    AV pk grad 6 mmHg    LV A4C EF 53.5        ECG  Encounter Date: 03/12/25   ECG 12 lead (Clinic Performed)   Result Value    Ventricular Rate 75    Atrial Rate 75    WV Interval 152    QRS Duration 88    QT Interval 404    QTC Calculation(Bazett) 451    P Axis 63    R Axis 70    T Axis 85    QRS Count 12    Q Onset 220    P Onset 144    P Offset 207    T Offset 422    QTC Fredericia 434    Narrative    Normal sinus rhythm  Possible Left atrial enlargement  Borderline ECG  When compared with ECG of 06-DEC-2023 15:44,  PREVIOUS ECG IS PRESENT  Confirmed by Marty Redd ([id number]) on 3/12/2025 3:37:03 PM       Echocardiogram  No results found for this or any previous visit from the past 1095 days.      CV Studies:  EKG:  Encounter Date: 03/12/25   ECG 12 lead (Clinic Performed)   Result Value    Ventricular Rate 75    Atrial Rate 75    WV Interval 152    QRS Duration 88    QT Interval 404    QTC Calculation(Bazett) 451    P Axis 63    R Axis 70    T Axis 85    QRS Count 12    Q Onset 220    P Onset 144    P Offset 207    T Offset 422    QTC Fredericia 434    Narrative    Normal sinus rhythm  Possible Left atrial enlargement  Borderline ECG  When compared with ECG of 06-DEC-2023 15:44,  PREVIOUS ECG IS PRESENT  Confirmed by Marty Redd [id number]) on 3/12/2025 3:37:03 PM     Echocardiogram:   Echocardiogram     Narrative  [place] [id number]  Phone [phone] Fax [phone]    TRANSTHORACIC ECHOCARDIOGRAM REPORT      Patient Name:     BLANCHE MADRIGAL Reading Physician:   [id number] Karel Marshall MD  Study  Date:       2/4/2022       Referring Physician: [id number] JACKIE QUINTANILLA  MRN/PID:          [id number]       PCP:  Accession/Order#: [id number]   Department Location: [facility]  YOB: 1965      Fellow:  Gender:           F              Nurse:  Admit Date:                      Sonographer:         Mahi Mcintosh RDCS  Admission Status: Outpatient     Additional Staff:  Height:           165.10 cm      CC Report to:  Weight:           74.84 kg       Study Type:          Echocardiogram  BSA:              1.82 m2  Blood Pressure: 122 /88 mmHg    Diagnosis/ICD: R07.9-Chest pain, unspecified; I71.2-Thoracic aortic aneurysm,  without rupture  Indication:    Chest Pain  Procedure/CPT: Echo Complete w Full [id number]    Patient History:  Pertinent History: THORACIC ANEURYSM WITHOUT RUPTURE.    Study Detail: The following Echo studies were performed: M-Mode, 2D, Doppler and  color flow.      PHYSICIAN INTERPRETATION:  Left Ventricle: The left ventricular systolic function is normal, with an estimated ejection fraction of 65-70%. There are no regional wall motion abnormalities. The left ventricular cavity size is normal. Spectral Doppler shows a normal pattern of left ventricular diastolic filling.  Left Atrium: The left atrium is normal in size.  Right Ventricle: The right ventricle is normal in size. There is normal right ventricular global systolic function.  Right Atrium: The right atrium is normal in size.  Aortic Valve: The aortic valve appears structurally normal. There is no evidence of aortic valve regurgitation. The peak instantaneous gradient of the aortic valve is 7.1 mmHg. The mean gradient of the aortic valve is 4.0 mmHg.  Mitral Valve: The mitral valve is normal in structure. There is no evidence of mitral valve regurgitation.  Tricuspid Valve: The tricuspid valve is structurally normal. There is trace tricuspid regurgitation.  Pulmonic Valve: The pulmonic valve is structurally normal. There  is no indication of pulmonic valve regurgitation.  Pericardium: There is no pericardial effusion noted.  Aorta: The aortic root is normal.      CONCLUSIONS:  1. The left ventricular systolic function is normal with a 65-70% estimated ejection fraction.    QUANTITATIVE DATA SUMMARY:  2D MEASUREMENTS:  Normal Ranges:  Ao Root d:     2.70 cm   (2.0-3.7cm)  LAs:           2.60 cm   (2.7-4.0cm)  IVSd:          1.00 cm   (0.6-1.1cm)  LVPWd:         0.80 cm   (0.6-1.1cm)  LVIDd:         4.50 cm   (3.9-5.9cm)  LVIDs:         2.65 cm  LV Mass Index: 72.9 g/m2  LV % FS        41.1 %    LA VOLUME:  Normal Ranges:  LA Vol A4C:        25.7 ml    (22+/-6mL/m2)  LA Vol A2C:        25.4 ml  LA Vol BP:         25.9 ml  LA Vol Index A4C:  14.1ml/m2  LA Vol Index A2C:  13.9 ml/m2  LA Vol Index BP:   14.2 ml/m2  LA Area A4C:       11.0 cm2  LA Area A2C:       10.8 cm2  LA Major Axis A4C: 4.0 cm  LA Major Axis A2C: 3.9 cm  LA Volume Index:   13.2 ml/m2  LA Vol A4C:        23.0 ml  LA Vol A2C:        24.0 ml    RA VOLUME BY A/L METHOD:  Normal Ranges:  RA Area A4C: 10.0 cm2    AORTA MEASUREMENTS:  Normal Ranges:  Ao Sinus, d: 2.70 cm (2.1-3.5cm)  Ao STJ, d:   2.60 cm (1.7-3.4cm)  Asc Ao, d:   4.80 cm (2.1-3.4cm)    LV SYSTOLIC FUNCTION BY 2D PLANIMETRY (MOD):  Normal Ranges:  EF-A4C View: 68.8 % (>55%)  EF-A2C View: 62.3 %  EF-Biplane:  65.8 %    LV DIASTOLIC FUNCTION:  Normal Ranges:  MV Peak E:        0.64 m/s    (0.7-1.2 m/s)  MV Peak A:        0.68 m/s    (0.42-0.7 m/s)  E/A Ratio:        0.94        (1.0-2.2)  MV e'             0.11 m/s    (>8.0)  MV lateral e'     0.13 m/s  MV medial e'      0.08 m/s  MV A Dur:         148.00 msec  E/e' Ratio:       6.07        (<8.0)  PulmV A Revs Dur: 169.00 msec    MITRAL VALVE:  Normal Ranges:  MV DT: 232 msec (150-240msec)    AORTIC VALVE:  Normal Ranges:  AoV Vmax:                1.33 m/s (<1.7m/s)  AoV Peak P.1 mmHg (<20mmHg)  AoV Mean P.0 mmHg  (1.7-11.5mmHg)  LVOT Max Ruben:            0.92 m/s (<1.1m/s)  AoV VTI:                 28.40 cm (18-25cm)  LVOT VTI:                18.40 cm  LVOT Diameter:           1.70 cm  (1.8-2.4cm)  AoV Area, VTI:           1.47 cm2 (2.5-5.5cm2)  AoV Area,Vmax:           1.56 cm2 (2.5-4.5cm2)  AoV Dimensionless Index: 0.65    RIGHT VENTRICLE:  RV 1   2.8 cm  RV 2   2.0 cm  RV 3   6.0 cm  TAPSE: 23.0 mm  RV s'  0.13 m/s    TRICUSPID VALVE/RVSP:  Normal Ranges:  Peak TR Velocity: 2.31 m/s  RV Syst Pressure: 26.3 mmHg (< 30mmHg)  TV E Vmax:        0.53 m/s  (0.3-0.7m/s)  TV A Vmax:        0.42 m/s  IVC Diam:         1.20 cm    Pulmonary Veins:  PulmV A Revs Dur: 169.00 msec      [id number] Karel Marshall MD  Electronically signed on 2/4/2022 at 11:14:19 AM         Final     Stress Testing IMGRESULT(FEV0076:1:1825):   NM CARDIAC STRESS REST (MYOCARDIAL PERFUSION MIBI) 02/04/2022    Narrative  MRN: [id number]  Patient Name: BLANCHE MADRIGAL    STUDY:  CARDIAC STRESS/REST INJECTION; PART 2 STRESS OR REST (NO CHARGE);  CARDIAC STRESS/REST (MYOCARDIAL PERFUSION/MIBI);  2/4/2022 9:21 am    INDICATION:  Chest Pain  R07.9: Chest pain I71.2: Thoracic aortic aneurysm without  rupture.    COMPARISON:  None.    ACCESSION NUMBER(S):  [id number]; [id number]; [id number]    ORDERING CLINICIAN:  JACKIE QUINTANILLA    TECHNIQUE:  DIVISION OF NUCLEAR MEDICINE  STRESS MYOCARDIAL PERFUSION SCAN, ONE DAY PROTOCOL    The patient received an intravenous dose of  11.6 mCi of Tc-99m  tetrofosmin and resting emission tomographic (SPECT) images of the  myocardium were acquired. The patient then exercised via treadmill  stress to  100 % of MPHR and achieved  6.0 METS. At peak stress  35.9  mCi of Tc-99m tetrofosmin were administered and stress phase SPECT  images of the myocardium were then acquired. These included ECG-gated  images to assess and quantify ventricular function.    FINDINGS:    There is a small fixed perfusion defect in the distal anterior wall  which resolves on prone  imaging consistent with breast attenuation.  No reversible perfusion defects seen.    Calculated ejection fraction of 67% without segmental wall motion  abnormality seen.    Impression  1. There is a small fixed perfusion defect in the distal anterior  wall which resolves on prone imaging consistent with breast  attenuation. There is a low probability of ischemia.    2. Calculated ejection fraction of 67% without segmental wall motion  abnormality seen.    Cardiac Catheterization:   Adult Cath     Narrative  [facility], Cath Lab, [address] [place] [address]    Cardiovascular Catheterization Report    Patient Name:     BLANCHE MADRIGAL Performing Physician:  [id number] Delgado MD  Study Date:       5/24/2023      Verifying Physician:   [id number] Delgado MD  MRN/PID:          [id number]       Cardiologist/Co-scrub:  Accession/Order#: [id number]      Fellow:                Nery Cook MD  YOB: 1965      Fellow:  Gender:           F              Referring Physician:   KYLIE DELGADO  Admit Date:                      Referring Physician:   [id number] Conrad Dior MD  Surgeon:                         Referring Physician:   [id number] Conrad Dior MD      Study: Left Heart Catheterization      Indications:  BLANCHE MADRIGAL is a 58 year old female who presents with tobacco Use - former. Surgical risk clearance low, with an asymptomatic chest pain assessment. Study performed as an elective cath procedure.    Medical History:  Stress test performed: No. CTA performed: No. Brien accessed: No. LVEF Assessed: Yes. LVEF = 50%.    Procedure Description:  After infiltration with 2% Lidocaine, the right radial artery was cannulated with a modified Seldinger technique. Subsequently a 6 Hungarian sheath was placed in the right radial artery. Selective coronary catheterization was performed using a 5 Fr catheter(s) exchanged over a guide wire to cannulate the coronary arteries. A JL 5 tip catheter  was used for left coronary injections. A JR 5 tip catheter was used for right coronary injections.  Multiple injections of contrast were made into the left and right coronary arteries with angiograms recorded in multiple projections. After completion of the procedure, the arterial sheath was pulled and a TR Band Radial Compression Device was utilized to obtain patent hemostasis.    Coronary Angiography:  The coronary circulation is co-dominant.    Left Main Coronary Artery:  The left main coronary artery is a normal caliber vessel. The left main arises normally from the left coronary sinus of Valsalva and bifurcates into the LAD and circumflex coronary arteries. The left main coronary artery showed no significant disease or stenosis greater than 30%.    Left Anterior Descending Coronary Artery Distribution:  The left anterior descending coronary artery is a normal caliber vessel. The LAD arises normally from the left main coronary artery. The LAD demonstrated no significant disease or stenosis greater than 30%. The 1st diagonal branch showed no significant disease or stenosis greater than 30%. The 2nd diagonal branch demonstrated no significant disease or stenosis greater than 30%.    Circumflex Coronary Artery Distribution:  The circumflex coronary artery is a large caliber vessel. The circumflex arises normally from the left main coronary artery and terminates in the AV groove. The circumflex revealed no significant disease or stenosis greater than 30%. The 1st obtuse marginal branch showed no significant disease or stenosis greater than 30%. The left posterolateral branch showed no significant disease or stenosis greater than 30%. The left posterior descending artery showed no significant disease or stenosis greater than 30%.    Ramus Intermedius:  The ramus intermedius arises normally from the left main coronary artery. The ramus intermedius showed no significant disease or stenosis greater than 30%.    Right  Coronary Artery Distribution:    The right coronary artery is a medium-sized caliber vessel. The RCA arises normally from the right sinus of Valsalva. The RCA showed no significant disease or stenosis greater than 30%. The acute marginal branch showed no significant disease or stenosis greater than 30%.  The right posterior descending artery showed no significant disease or stenosis greater than 30%.      Cardiac Cath Transition of Care Summary:  Post Procedure Diagnosis: No Significatn coronary artery disease.  Findings:                 Normal coronary arteries.  Blood Loss:               Estimated blood loss during the procedure was 3cc mls.  Specimens Removed:        Number of specimen(s) removed: none.    ____________________________________________________________________________________  CONCLUSIONS:  1. Angiographically unremarkable coronaries in a co-dominant system.    ____________________________________________________________________________________  CPT Codes:  Left Heart Cath (visualization of coronaries) and LV-85450; Moderate Sedation Services initial 15 minutes patient >5 years-05610    ICD 10 Codes:  I71.21-Aneurysm of the ascending aorta, without rupture    [id number] Antonio Delgado MD  Performing Physician  Electronically signed by [id number] Antonio Delgado MD on 5/24/2023 at 11:46:03 AM      cc Report to: ANTONIO DELGADO    cc Report to: [id number] Conrad Dior MD    cc Report to: [id number] Conrad Dior MD           Final   No results found for this or any previous visit from the past 3650 days.     Cardiac Scoring: No results found for this or any previous visit from the past 1825 days.    AAA : No results found for this or any previous visit from the past 1825 days.    OTHER: No results found for this or any previous visit from the past 1825 days.    LAST IMAGING RESULTS  Vascular US Carotid Artery Duplex Bilateral                [facility]  [address] [place] [address]       Phone  [phone] Fax [phone]       Vascular Lab Report     VASC US CAROTID ARTERY DUPLEX BILATERAL    Patient Name:      BLANCHE MADRIGAL       Reading Physician:  [id number] Lis Rico MD  Study Date:        4/1/2025             Ordering Provider:  [id number] JACKIE QUINTANILLA  MRN/PID:           [id number]             Fellow:  Accession#:        [id number]         Technologist:       Isadora Jenkins                                                              RVYVETTE  Date of Birth/Age: 1965 / 59 years Technologist 2:  Gender:            F                    Encounter#:         [id number]  Admission Status:  Outpatient           Location Performed: [facility]       Diagnosis/ICD: Other specified symptoms and signs involving the circulatory and                 respiratory systems-R09.89  CPT Codes:     66855 Cerebrovascular Carotid Duplex scan complete       CONCLUSIONS:  Right Carotid: Findings are consistent with less than 50% stenosis of the right proximal internal carotid artery. Laminar flow seen by color Doppler. Right external carotid artery appears patent with no evidence of stenosis. The right vertebral artery is patent with antegrade flow. No evidence of hemodynamically significant stenosis in the right subclavian artery.  Left Carotid: Findings are consistent with less than 50% stenosis of the left proximal internal carotid artery. Laminar flow seen by color Doppler. Left external carotid artery appears patent with no evidence of stenosis. The left vertebral artery is patent with antegrade flow. No evidence of hemodynamically significant stenosis in the left subclavian artery.     Imaging & Doppler Findings:      Right                        Left    PSV      EDV                PSV      EDV  76 cm/s  14 cm/s   CCA P    57 cm/s  8 cm/s  84 cm/s  19 cm/s   CCA D    72 cm/s  20 cm/s  44 cm/s  11 cm/s   ICA P    45 cm/s  10 cm/s  77 cm/s  34  cm/s   ICA M    85 cm/s  27 cm/s  124 cm/s 40 cm/s   ICA D    71 cm/s  17 cm/s  82 cm/s  11 cm/s    ECA     72 cm/s  10 cm/s  43 cm/s  9 cm/s  Vertebral  43 cm/s  10 cm/s  102 cm/s 3 cm/s  Subclavian 150 cm/s 0 cm/s                     Right Left  ICA/CCA Ratio  0.5  0.6          [id number] Lis Rico MD  Electronically signed by [id number] Lis Rico MD on 4/3/2025 at 7:10:59 PM       ** Final **      Problem List Items Addressed This Visit       Status post thoracic aortic aneurysm repair    Dyslipidemia    History of tobacco use    Obesity (BMI 30-39.9)    Palpitations    Amaurosis fugax - Primary          Marty Redd DO, FACC, FACOI

## 2025-04-18 DIAGNOSIS — J44.9 CHRONIC OBSTRUCTIVE PULMONARY DISEASE, UNSPECIFIED COPD TYPE (MULTI): ICD-10-CM

## 2025-04-18 RX ORDER — UMECLIDINIUM BROMIDE AND VILANTEROL TRIFENATATE 62.5; 25 UG/1; UG/1
1 POWDER RESPIRATORY (INHALATION) DAILY
Qty: 60 EACH | Refills: 3 | Status: SHIPPED | OUTPATIENT
Start: 2025-04-18

## 2025-05-07 ENCOUNTER — OFFICE VISIT (OUTPATIENT)
Dept: CARDIOLOGY | Facility: HOSPITAL | Age: 60
End: 2025-05-07
Payer: COMMERCIAL

## 2025-05-07 VITALS
DIASTOLIC BLOOD PRESSURE: 78 MMHG | BODY MASS INDEX: 29.99 KG/M2 | SYSTOLIC BLOOD PRESSURE: 122 MMHG | WEIGHT: 180 LBS | HEART RATE: 70 BPM | HEIGHT: 65 IN

## 2025-05-07 DIAGNOSIS — E78.5 DYSLIPIDEMIA: ICD-10-CM

## 2025-05-07 DIAGNOSIS — Z87.891 HISTORY OF TOBACCO USE: ICD-10-CM

## 2025-05-07 DIAGNOSIS — G45.3 AMAUROSIS FUGAX: Primary | ICD-10-CM

## 2025-05-07 DIAGNOSIS — Z86.79 STATUS POST THORACIC AORTIC ANEURYSM REPAIR: ICD-10-CM

## 2025-05-07 DIAGNOSIS — R00.2 PALPITATIONS: ICD-10-CM

## 2025-05-07 DIAGNOSIS — Z98.890 STATUS POST THORACIC AORTIC ANEURYSM REPAIR: ICD-10-CM

## 2025-05-07 DIAGNOSIS — E66.9 OBESITY (BMI 30-39.9): ICD-10-CM

## 2025-05-07 LAB
ATRIAL RATE: 70 BPM
P AXIS: 33 DEGREES
P OFFSET: 207 MS
P ONSET: 150 MS
PR INTERVAL: 146 MS
Q ONSET: 223 MS
QRS COUNT: 12 BEATS
QRS DURATION: 94 MS
QT INTERVAL: 396 MS
QTC CALCULATION(BAZETT): 427 MS
QTC FREDERICIA: 417 MS
R AXIS: 27 DEGREES
T AXIS: 76 DEGREES
T OFFSET: 421 MS
VENTRICULAR RATE: 70 BPM

## 2025-05-07 PROCEDURE — 99214 OFFICE O/P EST MOD 30 MIN: CPT | Mod: 25 | Performed by: INTERNAL MEDICINE

## 2025-05-07 PROCEDURE — 99214 OFFICE O/P EST MOD 30 MIN: CPT | Performed by: INTERNAL MEDICINE

## 2025-05-07 PROCEDURE — 93005 ELECTROCARDIOGRAM TRACING: CPT | Performed by: INTERNAL MEDICINE

## 2025-05-07 PROCEDURE — 93010 ELECTROCARDIOGRAM REPORT: CPT | Performed by: INTERNAL MEDICINE

## 2025-05-07 PROCEDURE — 3008F BODY MASS INDEX DOCD: CPT | Performed by: INTERNAL MEDICINE

## 2025-05-27 ENCOUNTER — OFFICE VISIT (OUTPATIENT)
Dept: PULMONOLOGY | Facility: HOSPITAL | Age: 60
End: 2025-05-27
Payer: COMMERCIAL

## 2025-05-27 VITALS
BODY MASS INDEX: 29.99 KG/M2 | SYSTOLIC BLOOD PRESSURE: 127 MMHG | RESPIRATION RATE: 16 BRPM | DIASTOLIC BLOOD PRESSURE: 81 MMHG | WEIGHT: 180 LBS | HEART RATE: 71 BPM | HEIGHT: 65 IN | OXYGEN SATURATION: 96 % | TEMPERATURE: 96.1 F

## 2025-05-27 DIAGNOSIS — F17.210 CIGARETTE SMOKER: ICD-10-CM

## 2025-05-27 DIAGNOSIS — J30.9 ALLERGIC RHINITIS, UNSPECIFIED SEASONALITY, UNSPECIFIED TRIGGER: ICD-10-CM

## 2025-05-27 DIAGNOSIS — J44.9 COPD, MILD (MULTI): Primary | ICD-10-CM

## 2025-05-27 PROCEDURE — 99214 OFFICE O/P EST MOD 30 MIN: CPT | Performed by: NURSE PRACTITIONER

## 2025-05-27 PROCEDURE — 3008F BODY MASS INDEX DOCD: CPT | Performed by: NURSE PRACTITIONER

## 2025-05-27 RX ORDER — AZELASTINE 1 MG/ML
1 SPRAY, METERED NASAL 2 TIMES DAILY
Qty: 1 ML | Refills: 11 | Status: SHIPPED | OUTPATIENT
Start: 2025-05-27

## 2025-05-27 RX ORDER — MONTELUKAST SODIUM 10 MG/1
10 TABLET ORAL NIGHTLY
Qty: 30 TABLET | Refills: 11 | Status: SHIPPED | OUTPATIENT
Start: 2025-05-27

## 2025-05-27 RX ORDER — PREDNISONE 10 MG/1
TABLET ORAL
Qty: 40 TABLET | Refills: 0 | Status: SHIPPED | OUTPATIENT
Start: 2025-05-27 | End: 2025-06-12

## 2025-05-27 ASSESSMENT — ENCOUNTER SYMPTOMS
CHILLS: 0
FEVER: 0
UNEXPECTED WEIGHT CHANGE: 0
SHORTNESS OF BREATH: 1
COUGH: 1
WHEEZING: 1
FATIGUE: 0
RHINORRHEA: 0

## 2025-05-27 NOTE — PATIENT INSTRUCTIONS
Please take prednisone taper until gone.  Continue on Anoro one puff daily.   Continue albuterol as needed.   Continue Zyrtec, Flonase, and start back on Singulair. Start on Azelastine nasal spray 1-2 times per day for increased sinus drainage.   Call with any questions or concerns.  Follow up with me in early December.

## 2025-05-27 NOTE — PROGRESS NOTES
Subjective   Patient ID: Cassidy Arnold is a 59 y.o. female who presents for follow up    HPI: Patient has PMH of nicotine dependence, GERD, CAD, aortic aneurysm, anxiety, and depression. She was referred for chronic cough. She states that she has been having a daily cough, which is mostly dry but will bring up some phlegm. She hears herself wheezing as well. She does have sinus drainage and congestion, she was recently started on Montelukast and Cetirizine, which has helped her symptoms. She has not been started on any inhalers yet. She denies any fever, chills, chest pain, leg swelling, or hemoptysis. She is a current smoker at 1 ppd and has smoked for the past 25 years. She currently works in the [employer] at [employer], she does work with cleaning supplies. She denies growing up on a farm or having barn animals.    Today she is here for follow up. She states that she has had increased sinus drainage and productive cough secondary to allergic rhinitis. She has been wheezing as well. She is smoking at 3/4 ppd. She has been out of Singulair. She has no other concerns.    Review of Systems   Constitutional:  Negative for chills, fatigue, fever and unexpected weight change.   HENT:  Positive for congestion. Negative for postnasal drip and rhinorrhea.    Respiratory:  Positive for cough (denies hemoptysis.), shortness of breath and wheezing.    Cardiovascular:  Negative for chest pain and leg swelling.   All other systems reviewed and are negative.      Objective   Physical Exam  Vitals reviewed.   Constitutional:       Appearance: Normal appearance.   HENT:      Head: Normocephalic.   Cardiovascular:      Rate and Rhythm: Normal rate and regular rhythm.   Pulmonary:      Effort: Pulmonary effort is normal.      Breath sounds: Wheezing (faint) present.   Skin:     General: Skin is warm and dry.   Neurological:      Mental Status: She is alert.         Assessment/Plan   1. COPD  2. Nicotine dependence  3. Allergic rhinitis  4.  GERD     -PFTs with FEV1/FVC 80, FEV1 76-80 discussed results and consider mild COPD, AAT normal. Continue Anoro one puff once a day, she has noticed significant benefit from this, and albuterol prn. Prednisone taper today.     -She is back to smoking at 1/2-1 ppd, prior she smoked 1 ppd and has smoked for the past 25 years. I reviewed CT from January and she does have centrilobular emphysema. She had most recent CT post surgery done July 20th, no suspicious nodules, she will qualify for LDCT July 2024. This was done and was stable, she had a CT chest for AAA surveillance in October 2024 which as also stable. Will continue annual screening. She is getting this with cardiac surgery in November.      -IGE 49, RAST negative. Continue Loratadine, Flonase, and start back on Singulair. Add Azelastine.      -GERD symptoms are currently controlled with omeprazole.      Overall I will treat her with a prednisone taper and start her back on Singulair and add Azelastine. I will see her back in 6 months. Will defer antibiotics as she states she is coughing from uncontrolled sinus drainage but instructed her to notify me if no improvement and will treat her for URI. I instructed patient to call sooner if needed.

## 2025-07-01 ENCOUNTER — APPOINTMENT (OUTPATIENT)
Dept: CARDIOLOGY | Facility: CLINIC | Age: 60
End: 2025-07-01
Payer: COMMERCIAL

## 2025-07-03 ENCOUNTER — HOSPITAL ENCOUNTER (OUTPATIENT)
Dept: RADIOLOGY | Facility: HOSPITAL | Age: 60
Discharge: HOME | End: 2025-07-03
Payer: COMMERCIAL

## 2025-07-03 DIAGNOSIS — R10.11 RIGHT UPPER QUADRANT PAIN: ICD-10-CM

## 2025-07-03 PROCEDURE — 76705 ECHO EXAM OF ABDOMEN: CPT

## 2025-07-03 PROCEDURE — 76705 ECHO EXAM OF ABDOMEN: CPT | Performed by: RADIOLOGY

## 2025-07-26 DIAGNOSIS — E78.5 DYSLIPIDEMIA: ICD-10-CM

## 2025-07-26 DIAGNOSIS — Z87.891 HISTORY OF TOBACCO USE: ICD-10-CM

## 2025-07-26 DIAGNOSIS — G45.3 AMAUROSIS FUGAX: ICD-10-CM

## 2025-07-26 DIAGNOSIS — E66.9 OBESITY (BMI 30-39.9): ICD-10-CM

## 2025-07-26 DIAGNOSIS — R00.2 PALPITATIONS: ICD-10-CM

## 2025-07-26 DIAGNOSIS — Z86.79 STATUS POST THORACIC AORTIC ANEURYSM REPAIR: ICD-10-CM

## 2025-07-26 DIAGNOSIS — Z98.890 STATUS POST THORACIC AORTIC ANEURYSM REPAIR: ICD-10-CM

## 2025-08-19 DIAGNOSIS — J44.9 CHRONIC OBSTRUCTIVE PULMONARY DISEASE, UNSPECIFIED COPD TYPE (MULTI): ICD-10-CM

## 2025-08-19 RX ORDER — UMECLIDINIUM BROMIDE AND VILANTEROL TRIFENATATE 62.5; 25 UG/1; UG/1
1 POWDER RESPIRATORY (INHALATION) DAILY
Qty: 60 EACH | Refills: 5 | Status: SHIPPED | OUTPATIENT
Start: 2025-08-19

## 2025-11-17 ENCOUNTER — APPOINTMENT (OUTPATIENT)
Dept: CARDIOLOGY | Facility: HOSPITAL | Age: 60
End: 2025-11-17
Payer: COMMERCIAL

## (undated) DEVICE — SOLUTION, IRRIGATION, STERILE WATER, 1000 ML, POUR BOTTLE

## (undated) DEVICE — LUBRICANT, WATER SOLUBLE, BACTERIOSTATIC, 2 OZ, STERILE

## (undated) DEVICE — SOLUTION, PREP, PVP IODINE, FLIP TOP, 4OZ

## (undated) DEVICE — PREP TRAY, SKIN, DRY, W/GLOVES

## (undated) DEVICE — GOWN, SURGICAL, UROLOGY, IMPERVIOUS, XLONG, XLARGE, DISPOSABLE

## (undated) DEVICE — BAG, PRESSURE INFUSER, 3000 CC, LF

## (undated) DEVICE — IRRIGATION SET, CYSTOSCOPY, REGULATING CLAMP, STRAIGHT, 81 IN

## (undated) DEVICE — LASER FIBER, OPTICAL, 365MIC (5/BX)

## (undated) DEVICE — Device

## (undated) DEVICE — GUIDEWIRE, STRAIGHT, ZIPWIRE, .025 X 150CM, STIFF

## (undated) DEVICE — BASKET, RETRIEVAL, DAKOTA, 1.9F X 11MM X 120CM

## (undated) DEVICE — GLOVE, PROTEXIS PI CLASSIC, SZ-7.0, PF, LF

## (undated) DEVICE — CATHETER, URETERAL, CONE TIP, 5 FR, WOVEN, RT & LFT, 70 CM, STERILE

## (undated) DEVICE — SOLUTION, IRRIGATION, USP, SODIUM CHLORIDE 0.9%, 3000 ML

## (undated) DEVICE — SYRINGE, 10 CC, LUER LOCK

## (undated) DEVICE — GUIDEWIRE, STRAIGHT TIP, .035 DIA, 150 CM, 3 CM TIP"